# Patient Record
Sex: FEMALE | Race: WHITE | Employment: FULL TIME
[De-identification: names, ages, dates, MRNs, and addresses within clinical notes are randomized per-mention and may not be internally consistent; named-entity substitution may affect disease eponyms.]

---

## 2017-01-09 DIAGNOSIS — N64.89 BREAST ASYMMETRY: Primary | ICD-10-CM

## 2017-02-17 ENCOUNTER — OFFICE VISIT (OUTPATIENT)
Dept: FAMILY MEDICINE CLINIC | Facility: CLINIC | Age: 42
End: 2017-02-17

## 2017-02-17 VITALS
HEART RATE: 72 BPM | WEIGHT: 198.4 LBS | DIASTOLIC BLOOD PRESSURE: 72 MMHG | BODY MASS INDEX: 38.95 KG/M2 | SYSTOLIC BLOOD PRESSURE: 102 MMHG | HEIGHT: 60 IN

## 2017-02-17 DIAGNOSIS — Z00.00 ROUTINE ADULT HEALTH MAINTENANCE: ICD-10-CM

## 2017-02-17 DIAGNOSIS — E03.9 HYPOTHYROIDISM, UNSPECIFIED TYPE: Primary | ICD-10-CM

## 2017-02-17 DIAGNOSIS — F41.9 ANXIETY: ICD-10-CM

## 2017-02-17 PROCEDURE — 99213 OFFICE O/P EST LOW 20 MIN: CPT | Performed by: FAMILY MEDICINE

## 2017-02-17 RX ORDER — CITALOPRAM 20 MG/1
20 TABLET ORAL DAILY
COMMUNITY
End: 2017-11-17 | Stop reason: SDUPTHER

## 2017-03-17 ENCOUNTER — HOSPITAL ENCOUNTER (OUTPATIENT)
Age: 42
Setting detail: SPECIMEN
Discharge: HOME | End: 2017-03-17
Payer: COMMERCIAL

## 2017-03-17 ENCOUNTER — OFFICE VISIT (OUTPATIENT)
Dept: OBGYN CLINIC | Age: 42
End: 2017-03-17
Payer: COMMERCIAL

## 2017-03-17 VITALS
HEIGHT: 60 IN | SYSTOLIC BLOOD PRESSURE: 110 MMHG | BODY MASS INDEX: 38.6 KG/M2 | WEIGHT: 196.6 LBS | DIASTOLIC BLOOD PRESSURE: 80 MMHG

## 2017-03-17 DIAGNOSIS — Z01.419 ENCOUNTER FOR GYNECOLOGICAL EXAMINATION WITHOUT ABNORMAL FINDING: Primary | ICD-10-CM

## 2017-03-17 DIAGNOSIS — N89.8 VAGINAL IRRITATION: ICD-10-CM

## 2017-03-17 DIAGNOSIS — Z12.31 ENCOUNTER FOR SCREENING MAMMOGRAM FOR BREAST CANCER: ICD-10-CM

## 2017-03-17 PROCEDURE — 99396 PREV VISIT EST AGE 40-64: CPT | Performed by: NURSE PRACTITIONER

## 2017-03-17 ASSESSMENT — ENCOUNTER SYMPTOMS
ABDOMINAL DISTENTION: 0
DIARRHEA: 0
SHORTNESS OF BREATH: 0
ABDOMINAL PAIN: 0
COUGH: 0
CONSTIPATION: 0
BACK PAIN: 0

## 2017-03-18 LAB
DIRECT EXAM: NORMAL
Lab: NORMAL
SPECIMEN DESCRIPTION: NORMAL
STATUS: NORMAL

## 2017-03-22 LAB — CYTOLOGY REPORT: NORMAL

## 2017-04-06 ENCOUNTER — HOSPITAL ENCOUNTER (OUTPATIENT)
Age: 42
Discharge: HOME | End: 2017-04-06
Payer: COMMERCIAL

## 2017-04-06 DIAGNOSIS — Z00.00 ROUTINE ADULT HEALTH MAINTENANCE: ICD-10-CM

## 2017-04-06 DIAGNOSIS — E03.9 HYPOTHYROIDISM, UNSPECIFIED TYPE: ICD-10-CM

## 2017-04-06 LAB
ALBUMIN SERPL-MCNC: 4.2 G/DL (ref 3.5–5.2)
ALBUMIN/GLOBULIN RATIO: 1.5 (ref 1–2.5)
ALP BLD-CCNC: 50 U/L (ref 35–104)
ALT SERPL-CCNC: 27 U/L (ref 5–33)
ANION GAP SERPL CALCULATED.3IONS-SCNC: 12 MMOL/L (ref 9–17)
AST SERPL-CCNC: 25 U/L
BILIRUB SERPL-MCNC: 0.39 MG/DL (ref 0.3–1.2)
BUN BLDV-MCNC: 11 MG/DL (ref 6–20)
BUN/CREAT BLD: ABNORMAL (ref 9–20)
CALCIUM SERPL-MCNC: 8.9 MG/DL (ref 8.6–10.4)
CHLORIDE BLD-SCNC: 103 MMOL/L (ref 98–107)
CHOLESTEROL/HDL RATIO: 5
CHOLESTEROL: 194 MG/DL
CO2: 24 MMOL/L (ref 20–31)
CREAT SERPL-MCNC: 0.71 MG/DL (ref 0.5–0.9)
GFR AFRICAN AMERICAN: >60 ML/MIN
GFR NON-AFRICAN AMERICAN: >60 ML/MIN
GFR SERPL CREATININE-BSD FRML MDRD: ABNORMAL ML/MIN/{1.73_M2}
GFR SERPL CREATININE-BSD FRML MDRD: ABNORMAL ML/MIN/{1.73_M2}
GLUCOSE BLD-MCNC: 107 MG/DL (ref 70–99)
HDLC SERPL-MCNC: 39 MG/DL
LDL CHOLESTEROL: 107 MG/DL (ref 0–130)
POTASSIUM SERPL-SCNC: 4.3 MMOL/L (ref 3.7–5.3)
SODIUM BLD-SCNC: 139 MMOL/L (ref 135–144)
THYROXINE, FREE: 1.12 NG/DL (ref 0.93–1.7)
TOTAL PROTEIN: 7 G/DL (ref 6.4–8.3)
TRIGL SERPL-MCNC: 238 MG/DL
TSH SERPL DL<=0.05 MIU/L-ACNC: 1.16 MIU/L (ref 0.3–5)
VLDLC SERPL CALC-MCNC: ABNORMAL MG/DL (ref 1–30)

## 2017-04-06 PROCEDURE — 84443 ASSAY THYROID STIM HORMONE: CPT

## 2017-04-06 PROCEDURE — 84439 ASSAY OF FREE THYROXINE: CPT

## 2017-04-06 PROCEDURE — 80053 COMPREHEN METABOLIC PANEL: CPT

## 2017-04-06 PROCEDURE — 80061 LIPID PANEL: CPT

## 2017-04-06 PROCEDURE — 36415 COLL VENOUS BLD VENIPUNCTURE: CPT

## 2017-05-17 ENCOUNTER — OFFICE VISIT (OUTPATIENT)
Dept: FAMILY MEDICINE CLINIC | Age: 42
End: 2017-05-17
Payer: COMMERCIAL

## 2017-05-17 VITALS
WEIGHT: 195 LBS | BODY MASS INDEX: 38.28 KG/M2 | OXYGEN SATURATION: 98 % | HEIGHT: 60 IN | HEART RATE: 76 BPM | SYSTOLIC BLOOD PRESSURE: 120 MMHG | RESPIRATION RATE: 16 BRPM | DIASTOLIC BLOOD PRESSURE: 76 MMHG

## 2017-05-17 DIAGNOSIS — Z00.00 ROUTINE ADULT HEALTH MAINTENANCE: Primary | ICD-10-CM

## 2017-05-17 DIAGNOSIS — Z12.11 SCREENING FOR COLON CANCER: ICD-10-CM

## 2017-05-17 DIAGNOSIS — Z23 IMMUNIZATION DUE: ICD-10-CM

## 2017-05-17 PROCEDURE — 90715 TDAP VACCINE 7 YRS/> IM: CPT | Performed by: FAMILY MEDICINE

## 2017-05-17 PROCEDURE — 90471 IMMUNIZATION ADMIN: CPT | Performed by: FAMILY MEDICINE

## 2017-05-17 PROCEDURE — 99396 PREV VISIT EST AGE 40-64: CPT | Performed by: FAMILY MEDICINE

## 2017-05-31 ENCOUNTER — TELEPHONE (OUTPATIENT)
Dept: FAMILY MEDICINE CLINIC | Age: 42
End: 2017-05-31

## 2017-05-31 DIAGNOSIS — Z80.0 FAMILY HX OF COLON CANCER: Primary | ICD-10-CM

## 2017-05-31 DIAGNOSIS — Z12.11 ENCOUNTER FOR SCREENING COLONOSCOPY: ICD-10-CM

## 2017-08-15 ENCOUNTER — TELEPHONE (OUTPATIENT)
Dept: FAMILY MEDICINE CLINIC | Age: 42
End: 2017-08-15

## 2017-08-15 RX ORDER — LEVOTHYROXINE SODIUM 0.05 MG/1
50 TABLET ORAL DAILY
Qty: 30 TABLET | Refills: 2 | Status: SHIPPED | OUTPATIENT
Start: 2017-08-15 | End: 2017-11-17 | Stop reason: SDUPTHER

## 2017-09-06 ENCOUNTER — HOSPITAL ENCOUNTER (OUTPATIENT)
Dept: MAMMOGRAPHY | Age: 42
Discharge: HOME | End: 2017-09-06
Payer: COMMERCIAL

## 2017-09-06 DIAGNOSIS — Z12.31 ENCOUNTER FOR SCREENING MAMMOGRAM FOR BREAST CANCER: ICD-10-CM

## 2017-09-06 PROCEDURE — 77063 BREAST TOMOSYNTHESIS BI: CPT

## 2017-10-04 ENCOUNTER — OFFICE VISIT (OUTPATIENT)
Dept: FAMILY MEDICINE CLINIC | Age: 42
End: 2017-10-04
Payer: COMMERCIAL

## 2017-10-04 VITALS
DIASTOLIC BLOOD PRESSURE: 68 MMHG | WEIGHT: 195 LBS | BODY MASS INDEX: 38.08 KG/M2 | SYSTOLIC BLOOD PRESSURE: 110 MMHG | OXYGEN SATURATION: 98 % | HEART RATE: 73 BPM | RESPIRATION RATE: 16 BRPM

## 2017-10-04 DIAGNOSIS — H10.9 CONJUNCTIVITIS OF RIGHT EYE, UNSPECIFIED CONJUNCTIVITIS TYPE: Primary | ICD-10-CM

## 2017-10-04 DIAGNOSIS — Z23 IMMUNIZATION DUE: ICD-10-CM

## 2017-10-04 PROCEDURE — 90471 IMMUNIZATION ADMIN: CPT | Performed by: FAMILY MEDICINE

## 2017-10-04 PROCEDURE — 99213 OFFICE O/P EST LOW 20 MIN: CPT | Performed by: FAMILY MEDICINE

## 2017-10-04 PROCEDURE — 90688 IIV4 VACCINE SPLT 0.5 ML IM: CPT | Performed by: FAMILY MEDICINE

## 2017-10-04 RX ORDER — TOBRAMYCIN 3 MG/ML
1 SOLUTION/ DROPS OPHTHALMIC EVERY 4 HOURS
Qty: 1 BOTTLE | Refills: 0 | Status: SHIPPED | OUTPATIENT
Start: 2017-10-04 | End: 2017-10-11

## 2017-10-04 NOTE — MR AVS SNAPSHOT
becoming more physically active will help lower your risk of developing or worsening diseases associated with obesity. Learn more at: [employer]             Today's Medication Changes          These changes are accurate as of: 10/4/17 11:55 AM.  If you have any questions, ask your nurse or doctor.                START taking these medications           tobramycin 0.3 % ophthalmic solution   Commonly known as:  TOBREX   Instructions:  Place 1 drop into the right eye every 4 hours for 7 days   Quantity:  1 Bottle   Refills:  0            Where to Get Your Medications      These medications were sent to [address],  [place] [address] [place]  [address]     Phone:  [phone]     tobramycin 0.3 % ophthalmic solution               Your Current Medications Are              tobramycin (TOBREX) 0.3 % ophthalmic solution Place 1 drop into the right eye every 4 hours for 7 days    levothyroxine (SYNTHROID) 50 MCG tablet Take 1 tablet by mouth Daily    citalopram (CELEXA) 20 MG tablet Take 20 mg by mouth daily    busPIRone (BUSPAR) 15 MG tablet 1/2 bid prn      Allergies           No Known Allergies      We Ordered/Performed the following           INFLUENZA, QUADV, 3 YRS AND OLDER, IM, MDV, 0.5ML ([facility])          Additional Information        Basic Information     Date Of Birth Sex Race Ethnicity Preferred Language    1975 Female White Non-/Non  English      Problem List as of 10/4/2017  Date Reviewed: 12/28/2016                Anxiety    Abnormal Pap smear    Fibroadenoma of left breast      Immunizations as of 10/4/2017     Name Date    Influenza Virus Vaccine 10/31/2016    Influenza, Quadv, 3 Years and older, IM 10/4/2017    Tdap (Boostrix, Adacel) 5/17/2017      Preventive Care        Date Due

## 2017-10-13 ENCOUNTER — HOSPITAL ENCOUNTER (OUTPATIENT)
Age: 42
Setting detail: SPECIMEN
Discharge: HOME | End: 2017-10-13
Payer: COMMERCIAL

## 2017-10-17 LAB — SURGICAL PATHOLOGY REPORT: NORMAL

## 2017-10-30 ENCOUNTER — OFFICE VISIT (OUTPATIENT)
Dept: FAMILY MEDICINE CLINIC | Age: 42
End: 2017-10-30
Payer: COMMERCIAL

## 2017-10-30 VITALS
HEART RATE: 99 BPM | OXYGEN SATURATION: 99 % | DIASTOLIC BLOOD PRESSURE: 74 MMHG | HEIGHT: 65 IN | SYSTOLIC BLOOD PRESSURE: 116 MMHG | WEIGHT: 196.6 LBS | BODY MASS INDEX: 32.76 KG/M2

## 2017-10-30 DIAGNOSIS — H66.001 ACUTE SUPPURATIVE OTITIS MEDIA OF RIGHT EAR WITHOUT SPONTANEOUS RUPTURE OF TYMPANIC MEMBRANE, RECURRENCE NOT SPECIFIED: Primary | ICD-10-CM

## 2017-10-30 PROCEDURE — 99213 OFFICE O/P EST LOW 20 MIN: CPT | Performed by: FAMILY MEDICINE

## 2017-10-30 RX ORDER — AMOXICILLIN 875 MG/1
875 TABLET, COATED ORAL 2 TIMES DAILY
Qty: 20 TABLET | Refills: 0 | Status: SHIPPED | OUTPATIENT
Start: 2017-10-30 | End: 2017-11-09

## 2017-10-30 NOTE — PROGRESS NOTES
Subjective:  Delia Mercado presents for   Chief Complaint   Patient presents with    Otalgia     right     Came on suddenly    Denies any other sx. Bending over cuasse spressure    No discharge. No hearing or tinnitius issue    Patient Active Problem List   Diagnosis    Anxiety    Abnormal Pap smear    Fibroadenoma of left breast       Review of Systems:  · General: no significant weight changes. · Respiratory: no cough, pleuritic chest pain, dyspnea, or wheezing  · Cardiovascular: no pain, TALBOT, orthopnea, palpitations, or claudication  · Gastrointestinal: no chronic nausea, vomiting, heartburn, diarrhea, constipation, bloating, or abdominal pain. No bloody or black stools. Objective:  Physical Exam   Vitals:   Vitals:    10/30/17 1610   BP: 116/74   Pulse: 99   SpO2: 99%   Weight: 196 lb 9.6 oz (89.2 kg)   Height: 5' 5\" (1.651 m)     Wt Readings from Last 3 Encounters:   10/30/17 196 lb 9.6 oz (89.2 kg)   10/04/17 195 lb (88.5 kg)   05/17/17 195 lb (88.5 kg)     Ht Readings from Last 3 Encounters:   10/30/17 5' 5\" (1.651 m)   05/17/17 5' (1.524 m)   03/17/17 5' 0.25\" (1.53 m)     Body mass index is 32.72 kg/m². Constitutional: She is oriented to person, place, and time. She appears well-developed and well-nourished and in no acute distress. Answers all my questions appropriately. Head: Normocephalic and atraumatic. Eyes:conjunctiva appear normal.  Right Ear: External ear normal. TM is very red and retracted. Left Ear: External ear normal. TM is clear  Nose: pink, non-edematous mucosa. No polyps. No septal deviation  Throat: no erythema, tonsillar hypertrophy or exudate. No ulcerations noted. Lips/Teeth/Gums all appear normal.  Neck: Normal range of motion. Neck supple. No tracheal deviation present. No abnormal lymphadenopathy. No JVD noted. Carotids are clear bilaterally. No thyroid masses noted. Heart: RRR without murmur. No S3, S4, or gallop noted.   Chest: Clear to auscultation

## 2017-11-17 RX ORDER — CITALOPRAM 20 MG/1
20 TABLET ORAL DAILY
Qty: 30 TABLET | Refills: 3 | Status: SHIPPED | OUTPATIENT
Start: 2017-11-17 | End: 2018-03-15 | Stop reason: SDUPTHER

## 2017-11-17 RX ORDER — LEVOTHYROXINE SODIUM 0.05 MG/1
50 TABLET ORAL DAILY
Qty: 30 TABLET | Refills: 2 | Status: SHIPPED | OUTPATIENT
Start: 2017-11-17 | End: 2018-03-14 | Stop reason: SDUPTHER

## 2017-12-22 ENCOUNTER — OFFICE VISIT (OUTPATIENT)
Dept: FAMILY MEDICINE CLINIC | Age: 42
End: 2017-12-22
Payer: COMMERCIAL

## 2017-12-22 VITALS
OXYGEN SATURATION: 98 % | RESPIRATION RATE: 16 BRPM | SYSTOLIC BLOOD PRESSURE: 122 MMHG | WEIGHT: 197 LBS | BODY MASS INDEX: 32.78 KG/M2 | DIASTOLIC BLOOD PRESSURE: 80 MMHG | HEART RATE: 68 BPM

## 2017-12-22 DIAGNOSIS — R42 VERTIGO: Primary | ICD-10-CM

## 2017-12-22 PROCEDURE — 99213 OFFICE O/P EST LOW 20 MIN: CPT | Performed by: FAMILY MEDICINE

## 2017-12-22 RX ORDER — MECLIZINE HCL 12.5 MG/1
12.5 TABLET ORAL 3 TIMES DAILY PRN
Qty: 30 TABLET | Refills: 0 | Status: SHIPPED | OUTPATIENT
Start: 2017-12-22 | End: 2018-01-01

## 2017-12-22 ASSESSMENT — PATIENT HEALTH QUESTIONNAIRE - PHQ9
2. FEELING DOWN, DEPRESSED OR HOPELESS: 0
SUM OF ALL RESPONSES TO PHQ QUESTIONS 1-9: 0
SUM OF ALL RESPONSES TO PHQ9 QUESTIONS 1 & 2: 0
1. LITTLE INTEREST OR PLEASURE IN DOING THINGS: 0

## 2017-12-22 ASSESSMENT — ENCOUNTER SYMPTOMS
GASTROINTESTINAL NEGATIVE: 1
RESPIRATORY NEGATIVE: 1

## 2017-12-22 NOTE — PROGRESS NOTES
Visit Information    Have you changed or started any medications since your last visit including any over-the-counter medicines, vitamins, or herbal medicines? no   Have you stopped taking any of your medications? Is so, why? -  no  Are you having any side effects from any of your medications? - no    Have you seen any other physician or provider since your last visit?  no   Have you had any other diagnostic tests since your last visit?  no   Have you been seen in the emergency room and/or had an admission in a hospital since we last saw you?  no   Have you had your routine dental cleaning in the past 6 months?  yes -      Do you have an active MyChart account? If no, what is the barrier?   Yes    Patient Care Team:  Riya Rodriguez MD as PCP - General (Family Medicine)  Riya Rodriguez MD as PCP - [facility] Attributed Provider    Medical History Review  Past Medical, Family, and Social History reviewed and does not contribute to the patient presenting condition    Health Maintenance   Topic Date Due    HIV screen  01/06/1991    Cervical cancer screen  03/17/2018    Diabetes screen  02/18/2019    Lipid screen  04/06/2022    DTaP/Tdap/Td vaccine (2 - Td) 05/17/2027    Flu vaccine  Completed

## 2018-03-14 RX ORDER — LEVOTHYROXINE SODIUM 0.05 MG/1
TABLET ORAL
Qty: 30 TABLET | Refills: 5 | Status: SHIPPED | OUTPATIENT
Start: 2018-03-14 | End: 2018-09-04 | Stop reason: SDUPTHER

## 2018-03-14 NOTE — TELEPHONE ENCOUNTER
Next Visit Date:  No future appointments.     Health Maintenance   Topic Date Due    HIV screen  01/06/1991    Cervical cancer screen  03/17/2018    Diabetes screen  02/18/2019    Lipid screen  04/06/2022    DTaP/Tdap/Td vaccine (2 - Td) 05/17/2027    Flu vaccine  Completed       Hemoglobin A1C (%)   Date Value   02/18/2016 5.1   07/20/2013 5.4             ( goal A1C is < 7)   No results found for: LABMICR  LDL Cholesterol (mg/dL)   Date Value   04/06/2017 107       (goal LDL is <100)   AST (U/L)   Date Value   04/06/2017 25     ALT (U/L)   Date Value   04/06/2017 27     BUN (mg/dL)   Date Value   04/06/2017 11     BP Readings from Last 3 Encounters:   12/22/17 122/80   10/30/17 116/74   10/04/17 110/68          (goal 120/80)    All Future Testing planned in [facility]  Lab Frequency Next Occurrence   PAP SMEAR Once 04/14/2017   VAGINITIS DNA PROBE Once 03/17/2017               Patient Active Problem List:     Anxiety     Abnormal Pap smear     Fibroadenoma of left breast

## 2018-03-15 RX ORDER — LEVOTHYROXINE SODIUM 0.05 MG/1
TABLET ORAL
Qty: 30 TABLET | Refills: 2 | OUTPATIENT
Start: 2018-03-15

## 2018-03-15 RX ORDER — CITALOPRAM 20 MG/1
TABLET ORAL
Qty: 30 TABLET | Refills: 3 | Status: SHIPPED | OUTPATIENT
Start: 2018-03-15 | End: 2018-07-17 | Stop reason: SDUPTHER

## 2018-05-22 ENCOUNTER — EMPLOYEE WELLNESS (OUTPATIENT)
Dept: OTHER | Age: 43
End: 2018-05-22

## 2018-05-22 LAB
CHOLESTEROL/HDL RATIO: 4
CHOLESTEROL: 177 MG/DL
GLUCOSE BLD-MCNC: 107 MG/DL (ref 70–99)
HDLC SERPL-MCNC: 44 MG/DL
LDL CHOLESTEROL: 112 MG/DL (ref 0–130)
PATIENT FASTING?: YES
TRIGL SERPL-MCNC: 104 MG/DL
VLDLC SERPL CALC-MCNC: ABNORMAL MG/DL (ref 1–30)

## 2018-05-29 VITALS — WEIGHT: 199 LBS | BODY MASS INDEX: 33.12 KG/M2

## 2018-06-11 ENCOUNTER — TELEPHONE (OUTPATIENT)
Dept: FAMILY MEDICINE CLINIC | Age: 43
End: 2018-06-11

## 2018-06-27 ENCOUNTER — HOSPITAL ENCOUNTER (OUTPATIENT)
Age: 43
Setting detail: SPECIMEN
Discharge: HOME | End: 2018-06-27
Payer: COMMERCIAL

## 2018-06-27 ENCOUNTER — OFFICE VISIT (OUTPATIENT)
Dept: OBGYN CLINIC | Age: 43
End: 2018-06-27
Payer: COMMERCIAL

## 2018-06-27 VITALS
WEIGHT: 197.6 LBS | SYSTOLIC BLOOD PRESSURE: 106 MMHG | DIASTOLIC BLOOD PRESSURE: 82 MMHG | BODY MASS INDEX: 37.31 KG/M2 | HEIGHT: 61 IN

## 2018-06-27 DIAGNOSIS — N89.8 VAGINAL ITCHING: ICD-10-CM

## 2018-06-27 DIAGNOSIS — Z01.419 ENCOUNTER FOR GYNECOLOGICAL EXAMINATION: Primary | ICD-10-CM

## 2018-06-27 DIAGNOSIS — Z12.31 ENCOUNTER FOR SCREENING MAMMOGRAM FOR BREAST CANCER: ICD-10-CM

## 2018-06-27 LAB
DIRECT EXAM: NORMAL
Lab: NORMAL
SPECIMEN DESCRIPTION: NORMAL
STATUS: NORMAL

## 2018-06-27 PROCEDURE — 99396 PREV VISIT EST AGE 40-64: CPT | Performed by: NURSE PRACTITIONER

## 2018-06-27 RX ORDER — IBUPROFEN 800 MG/1
800 TABLET ORAL EVERY 8 HOURS PRN
Qty: 120 TABLET | Refills: 1 | Status: SHIPPED | OUTPATIENT
Start: 2018-06-27 | End: 2018-09-27

## 2018-06-27 ASSESSMENT — ENCOUNTER SYMPTOMS
CONSTIPATION: 0
ABDOMINAL PAIN: 0
COUGH: 0
ABDOMINAL DISTENTION: 0
BACK PAIN: 0
SHORTNESS OF BREATH: 0
DIARRHEA: 0

## 2018-07-10 ENCOUNTER — HOSPITAL ENCOUNTER (OUTPATIENT)
Age: 43
Setting detail: SPECIMEN
Discharge: HOME | End: 2018-07-10
Payer: COMMERCIAL

## 2018-07-10 ENCOUNTER — TELEPHONE (OUTPATIENT)
Dept: OBGYN CLINIC | Age: 43
End: 2018-07-10

## 2018-07-10 ENCOUNTER — PROCEDURE VISIT (OUTPATIENT)
Dept: OBGYN CLINIC | Age: 43
End: 2018-07-10
Payer: COMMERCIAL

## 2018-07-10 VITALS
WEIGHT: 200.8 LBS | SYSTOLIC BLOOD PRESSURE: 108 MMHG | DIASTOLIC BLOOD PRESSURE: 62 MMHG | BODY MASS INDEX: 37.91 KG/M2 | HEIGHT: 61 IN

## 2018-07-10 DIAGNOSIS — Z30.432 ENCOUNTER FOR IUD REMOVAL: ICD-10-CM

## 2018-07-10 DIAGNOSIS — Z30.430 ENCOUNTER FOR IUD INSERTION: ICD-10-CM

## 2018-07-10 DIAGNOSIS — Z11.3 ROUTINE SCREENING FOR STI (SEXUALLY TRANSMITTED INFECTION): ICD-10-CM

## 2018-07-10 DIAGNOSIS — Z30.019 ENCOUNTER FOR FEMALE BIRTH CONTROL: Primary | ICD-10-CM

## 2018-07-10 PROCEDURE — 58301 REMOVE INTRAUTERINE DEVICE: CPT | Performed by: NURSE PRACTITIONER

## 2018-07-10 PROCEDURE — 58300 INSERT INTRAUTERINE DEVICE: CPT | Performed by: NURSE PRACTITIONER

## 2018-07-10 NOTE — TELEPHONE ENCOUNTER
Sherry Duran spoke with Claudio Heath at [address] pt must bring [facility] Contraception Card for IUD to be covered   Called pt left msg

## 2018-07-11 LAB
C TRACH DNA GENITAL QL NAA+PROBE: NEGATIVE
N. GONORRHOEAE DNA: NEGATIVE

## 2018-07-16 LAB — CYTOLOGY REPORT: NORMAL

## 2018-07-18 RX ORDER — CITALOPRAM 20 MG/1
TABLET ORAL
Qty: 90 TABLET | Refills: 0 | Status: SHIPPED | OUTPATIENT
Start: 2018-07-18 | End: 2018-09-27 | Stop reason: ALTCHOICE

## 2018-08-08 ENCOUNTER — OFFICE VISIT (OUTPATIENT)
Dept: OBGYN CLINIC | Age: 43
End: 2018-08-08

## 2018-08-08 VITALS
SYSTOLIC BLOOD PRESSURE: 122 MMHG | WEIGHT: 198 LBS | HEIGHT: 60 IN | BODY MASS INDEX: 38.87 KG/M2 | DIASTOLIC BLOOD PRESSURE: 70 MMHG

## 2018-08-08 DIAGNOSIS — Z30.431 IUD CHECK UP: Primary | ICD-10-CM

## 2018-08-08 DIAGNOSIS — Z97.5 IUD (INTRAUTERINE DEVICE) IN PLACE: ICD-10-CM

## 2018-08-08 PROCEDURE — 99024 POSTOP FOLLOW-UP VISIT: CPT | Performed by: NURSE PRACTITIONER

## 2018-08-08 NOTE — PROGRESS NOTES
for intraepithelial lesion or malignancy. Cytotechnologist:   FANNIE Quiñones(ASCP)  **Electronically Signed Out**  cd/7/16/2018     VAGINITIS DNA PROBE    Collection Time: 06/27/18  3:05 PM   Result Value Ref Range    Specimen Description . VAGINA     Special Requests NOT REPORTED     Direct Exam NEGATIVE for Gardnerella vaginalis     Direct Exam NEGATIVE for Trichomonas vaginalis     Direct Exam NEGATIVE for Candida sp. Direct Exam       Method of testing is a DNA probe intended for detection and identification of    Direct Exam        Candida species, Gardnerella vaginalis, and Trichomonas vaginalis nucleic acid    Direct Exam        in vaginal fluid specimens from patients with symptoms of vaginitis/vaginosis.     Status FINAL 06/27/2018    C.trachomatis N.gonorrhoeae DNA    Collection Time: 07/10/18  4:20 PM   Result Value Ref Range    C. trachomatis DNA NEGATIVE NEG    N. gonorrhoeae DNA NEGATIVE NEG       P-  RTO annual exam and prn

## 2018-09-04 RX ORDER — LEVOTHYROXINE SODIUM 50 MCG
TABLET ORAL
Qty: 30 TABLET | Refills: 2 | Status: SHIPPED | OUTPATIENT
Start: 2018-09-04 | End: 2018-11-07 | Stop reason: SDUPTHER

## 2018-09-04 NOTE — TELEPHONE ENCOUNTER
Next Visit Date:  No future appointments.     Health Maintenance   Topic Date Due    HIV screen  01/06/1991    Flu vaccine (1) 09/01/2018    Diabetes screen  02/18/2019    Cervical cancer screen  06/27/2019    Lipid screen  05/22/2023    DTaP/Tdap/Td vaccine (2 - Td) 05/17/2027       Hemoglobin A1C (%)   Date Value   02/18/2016 5.1   07/20/2013 5.4             ( goal A1C is < 7)   No results found for: LABMICR  LDL Cholesterol (mg/dL)   Date Value   05/22/2018 112   04/06/2017 107       (goal LDL is <100)   AST (U/L)   Date Value   04/06/2017 25     ALT (U/L)   Date Value   04/06/2017 27     BUN (mg/dL)   Date Value   04/06/2017 11     BP Readings from Last 3 Encounters:   08/08/18 122/70   07/10/18 108/62   06/27/18 106/82          (goal 120/80)    All Future Testing planned in [facility]  Lab Frequency Next Occurrence   PAP Smear Once 07/26/2018   HAIDER DIGITAL SCREEN W CAD BILATERAL Once 09/06/2018               Patient Active Problem List:     Anxiety     Abnormal Pap smear     Fibroadenoma of left breast

## 2018-09-14 ENCOUNTER — HOSPITAL ENCOUNTER (OUTPATIENT)
Dept: MAMMOGRAPHY | Age: 43
Discharge: HOME | End: 2018-09-16
Payer: COMMERCIAL

## 2018-09-14 DIAGNOSIS — Z12.31 ENCOUNTER FOR SCREENING MAMMOGRAM FOR BREAST CANCER: ICD-10-CM

## 2018-09-14 PROCEDURE — 77067 SCR MAMMO BI INCL CAD: CPT

## 2018-09-27 ENCOUNTER — OFFICE VISIT (OUTPATIENT)
Dept: FAMILY MEDICINE CLINIC | Age: 43
End: 2018-09-27
Payer: COMMERCIAL

## 2018-09-27 VITALS
WEIGHT: 197 LBS | HEART RATE: 74 BPM | SYSTOLIC BLOOD PRESSURE: 122 MMHG | DIASTOLIC BLOOD PRESSURE: 72 MMHG | BODY MASS INDEX: 38.47 KG/M2

## 2018-09-27 DIAGNOSIS — Z23 IMMUNIZATION DUE: ICD-10-CM

## 2018-09-27 DIAGNOSIS — F41.9 ANXIETY: Primary | ICD-10-CM

## 2018-09-27 DIAGNOSIS — E03.9 HYPOTHYROIDISM, UNSPECIFIED TYPE: ICD-10-CM

## 2018-09-27 PROCEDURE — 90471 IMMUNIZATION ADMIN: CPT | Performed by: FAMILY MEDICINE

## 2018-09-27 PROCEDURE — 99213 OFFICE O/P EST LOW 20 MIN: CPT | Performed by: FAMILY MEDICINE

## 2018-09-27 PROCEDURE — 90688 IIV4 VACCINE SPLT 0.5 ML IM: CPT | Performed by: FAMILY MEDICINE

## 2018-09-27 RX ORDER — DULOXETIN HYDROCHLORIDE 30 MG/1
30 CAPSULE, DELAYED RELEASE ORAL DAILY
Qty: 30 CAPSULE | Refills: 0 | Status: SHIPPED | OUTPATIENT
Start: 2018-09-27 | End: 2019-04-09 | Stop reason: SDUPTHER

## 2018-09-27 RX ORDER — DULOXETIN HYDROCHLORIDE 30 MG/1
30 CAPSULE, DELAYED RELEASE ORAL DAILY
Qty: 90 CAPSULE | Refills: 1 | Status: SHIPPED | OUTPATIENT
Start: 2018-09-27 | End: 2019-04-09 | Stop reason: SDUPTHER

## 2018-09-27 RX ORDER — CITALOPRAM 20 MG/1
TABLET ORAL
Qty: 90 TABLET | Refills: 3 | Status: CANCELLED | OUTPATIENT
Start: 2018-09-27

## 2018-09-27 ASSESSMENT — PATIENT HEALTH QUESTIONNAIRE - PHQ9
2. FEELING DOWN, DEPRESSED OR HOPELESS: 0
SUM OF ALL RESPONSES TO PHQ QUESTIONS 1-9: 0
SUM OF ALL RESPONSES TO PHQ QUESTIONS 1-9: 0
1. LITTLE INTEREST OR PLEASURE IN DOING THINGS: 0
SUM OF ALL RESPONSES TO PHQ9 QUESTIONS 1 & 2: 0

## 2018-09-27 NOTE — PROGRESS NOTES
Subjective:      Patient ID: Leo Bob is a 44 y.o. female. HPI     Here for evaluation of anxiety    Currently taking celexa 20 mg daily. Doesn't feel like it is working. Has been on a higher dose of it before and also has taken buspar for augmentation without much effect. Lot of stress at work    History of hypothyroidism    Review of Systems   Except as noted in HPI, ROS negative    Objective:   Physical Exam   Constitutional: She appears well-developed and well-nourished. No distress. HENT:   Head: Normocephalic and atraumatic. Cardiovascular: Normal rate. Pulmonary/Chest: Effort normal.   Neurological: She is alert. Skin: Skin is warm and dry. Psychiatric:   Tearful   Vitals reviewed. Assessment:      1. Anxiety    2. Immunization due    3. Hypothyroidism, unspecified type          Plan:      Cross taper Cymbalta and Celexa.   Schedule Oralee  She plans to do bioidential hormone replacement  Check TSH/T4  FU 1 mo or sooner PRN        Coretta Schilling, DO

## 2018-09-27 NOTE — PROGRESS NOTES
Visit Information    Have you changed or started any medications since your last visit including any over-the-counter medicines, vitamins, or herbal medicines? no   Are you having any side effects from any of your medications? -  no  Have you stopped taking any of your medications? Is so, why? -  no    Have you seen any other physician or provider since your last visit? No  Have you had any other diagnostic tests since your last visit? No  Have you been seen in the emergency room and/or had an admission to a hospital since we last saw you? No  Have you had your routine dental cleaning in the past 6 months? yes -     Have you activated your [employer] account? If not, what are your barriers?  Yes     Patient Care Team:  Ratna Rogers DO as PCP - Keyur Thompson DO as PCP - [facility] Attributed Provider    Medical History Review  Past Medical, Family, and Social History reviewed and  contribute to the patient presenting condition    Health Maintenance   Topic Date Due    HIV screen  1991    Flu vaccine (1) 2018    Diabetes screen  2019    Cervical cancer screen  2019    Lipid screen  2023    DTaP/Tdap/Td vaccine (2 - Td) 2027

## 2018-09-28 ENCOUNTER — HOSPITAL ENCOUNTER (OUTPATIENT)
Age: 43
Discharge: HOME | End: 2018-09-28
Payer: COMMERCIAL

## 2018-09-28 DIAGNOSIS — E03.9 HYPOTHYROIDISM, UNSPECIFIED TYPE: ICD-10-CM

## 2018-09-28 LAB — TSH SERPL DL<=0.05 MIU/L-ACNC: 0.98 MIU/L (ref 0.3–5)

## 2018-09-28 PROCEDURE — 84443 ASSAY THYROID STIM HORMONE: CPT

## 2018-09-28 PROCEDURE — 36415 COLL VENOUS BLD VENIPUNCTURE: CPT

## 2018-11-07 RX ORDER — LEVOTHYROXINE SODIUM 50 MCG
TABLET ORAL
Qty: 30 TABLET | Refills: 11 | Status: SHIPPED | OUTPATIENT
Start: 2018-11-07 | End: 2019-12-03 | Stop reason: SDUPTHER

## 2018-12-13 DIAGNOSIS — E03.9 HYPOTHYROIDISM, UNSPECIFIED TYPE: Primary | ICD-10-CM

## 2018-12-21 ENCOUNTER — TELEPHONE (OUTPATIENT)
Dept: FAMILY MEDICINE CLINIC | Age: 43
End: 2018-12-21

## 2019-01-17 ENCOUNTER — PATIENT MESSAGE (OUTPATIENT)
Dept: FAMILY MEDICINE CLINIC | Age: 44
End: 2019-01-17

## 2019-04-30 ENCOUNTER — EMPLOYEE WELLNESS (OUTPATIENT)
Dept: OTHER | Age: 44
End: 2019-04-30

## 2019-04-30 LAB
CHOLESTEROL/HDL RATIO: 4.5
CHOLESTEROL: 229 MG/DL
GLUCOSE BLD-MCNC: 109 MG/DL (ref 70–99)
HDLC SERPL-MCNC: 51 MG/DL
LDL CHOLESTEROL: 136 MG/DL (ref 0–130)
PATIENT FASTING?: YES
TRIGL SERPL-MCNC: 210 MG/DL
VLDLC SERPL CALC-MCNC: ABNORMAL MG/DL (ref 1–30)

## 2019-05-06 VITALS — WEIGHT: 205 LBS | BODY MASS INDEX: 40.04 KG/M2

## 2019-05-29 ENCOUNTER — OFFICE VISIT (OUTPATIENT)
Dept: FAMILY MEDICINE CLINIC | Age: 44
End: 2019-05-29
Payer: COMMERCIAL

## 2019-05-29 VITALS
HEART RATE: 88 BPM | DIASTOLIC BLOOD PRESSURE: 78 MMHG | OXYGEN SATURATION: 98 % | BODY MASS INDEX: 41.23 KG/M2 | HEIGHT: 60 IN | WEIGHT: 210 LBS | SYSTOLIC BLOOD PRESSURE: 132 MMHG

## 2019-05-29 DIAGNOSIS — E03.9 HYPOTHYROIDISM, UNSPECIFIED TYPE: ICD-10-CM

## 2019-05-29 DIAGNOSIS — E78.00 ELEVATED CHOLESTEROL: ICD-10-CM

## 2019-05-29 DIAGNOSIS — Z00.00 ROUTINE PHYSICAL EXAMINATION: Primary | ICD-10-CM

## 2019-05-29 DIAGNOSIS — R00.2 PALPITATIONS: ICD-10-CM

## 2019-05-29 DIAGNOSIS — R53.83 FATIGUE, UNSPECIFIED TYPE: ICD-10-CM

## 2019-05-29 DIAGNOSIS — R63.5 WEIGHT GAIN: ICD-10-CM

## 2019-05-29 DIAGNOSIS — R73.01 ELEVATED FASTING GLUCOSE: ICD-10-CM

## 2019-05-29 PROCEDURE — 99396 PREV VISIT EST AGE 40-64: CPT | Performed by: FAMILY MEDICINE

## 2019-05-29 ASSESSMENT — PATIENT HEALTH QUESTIONNAIRE - PHQ9
SUM OF ALL RESPONSES TO PHQ QUESTIONS 1-9: 0
SUM OF ALL RESPONSES TO PHQ QUESTIONS 1-9: 0
2. FEELING DOWN, DEPRESSED OR HOPELESS: 0
1. LITTLE INTEREST OR PLEASURE IN DOING THINGS: 0
SUM OF ALL RESPONSES TO PHQ9 QUESTIONS 1 & 2: 0
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
## 2019-07-02 ENCOUNTER — HOSPITAL ENCOUNTER (OUTPATIENT)
Age: 44
Discharge: HOME | End: 2019-07-04
Payer: COMMERCIAL

## 2019-07-02 ENCOUNTER — HOSPITAL ENCOUNTER (OUTPATIENT)
Dept: ULTRASOUND IMAGING | Age: 44
Discharge: HOME | End: 2019-07-04
Payer: COMMERCIAL

## 2019-07-02 DIAGNOSIS — D48.5 NEOPLASM OF UNCERTAIN BEHAVIOR OF SKIN: ICD-10-CM

## 2019-07-02 PROCEDURE — 76882 US LMTD JT/FCL EVL NVASC XTR: CPT

## 2019-07-19 ENCOUNTER — HOSPITAL ENCOUNTER (OUTPATIENT)
Age: 44
Setting detail: SPECIMEN
Discharge: HOME | End: 2019-07-19
Payer: COMMERCIAL

## 2019-07-19 ENCOUNTER — OFFICE VISIT (OUTPATIENT)
Dept: OBGYN CLINIC | Age: 44
End: 2019-07-19
Payer: COMMERCIAL

## 2019-07-19 VITALS
SYSTOLIC BLOOD PRESSURE: 104 MMHG | WEIGHT: 204 LBS | HEIGHT: 60 IN | DIASTOLIC BLOOD PRESSURE: 70 MMHG | BODY MASS INDEX: 40.05 KG/M2

## 2019-07-19 DIAGNOSIS — Z97.5 IUD (INTRAUTERINE DEVICE) IN PLACE: ICD-10-CM

## 2019-07-19 DIAGNOSIS — Z01.419 ENCOUNTER FOR GYNECOLOGICAL EXAMINATION: Primary | ICD-10-CM

## 2019-07-19 DIAGNOSIS — Z12.31 ENCOUNTER FOR SCREENING MAMMOGRAM FOR BREAST CANCER: ICD-10-CM

## 2019-07-19 PROCEDURE — 99396 PREV VISIT EST AGE 40-64: CPT | Performed by: NURSE PRACTITIONER

## 2019-07-19 ASSESSMENT — ENCOUNTER SYMPTOMS
COUGH: 0
ABDOMINAL DISTENTION: 0
SHORTNESS OF BREATH: 0
DIARRHEA: 0
CONSTIPATION: 0
ABDOMINAL PAIN: 0
BACK PAIN: 0

## 2019-07-24 LAB — CYTOLOGY REPORT: NORMAL

## 2019-09-18 ENCOUNTER — OFFICE VISIT (OUTPATIENT)
Dept: BEHAVIORAL/MENTAL HEALTH | Age: 44
End: 2019-09-18
Payer: COMMERCIAL

## 2019-09-18 DIAGNOSIS — F41.1 GAD (GENERALIZED ANXIETY DISORDER): Primary | ICD-10-CM

## 2019-09-18 PROCEDURE — 90791 PSYCH DIAGNOSTIC EVALUATION: CPT | Performed by: PSYCHOLOGIST

## 2019-09-24 ENCOUNTER — HOSPITAL ENCOUNTER (OUTPATIENT)
Dept: MAMMOGRAPHY | Age: 44
Discharge: HOME | End: 2019-09-26
Payer: COMMERCIAL

## 2019-09-24 DIAGNOSIS — Z12.31 ENCOUNTER FOR SCREENING MAMMOGRAM FOR BREAST CANCER: ICD-10-CM

## 2019-09-24 PROCEDURE — 77063 BREAST TOMOSYNTHESIS BI: CPT

## 2019-10-02 ENCOUNTER — OFFICE VISIT (OUTPATIENT)
Dept: BEHAVIORAL/MENTAL HEALTH | Age: 44
End: 2019-10-02
Payer: COMMERCIAL

## 2019-10-02 DIAGNOSIS — F41.1 GAD (GENERALIZED ANXIETY DISORDER): Primary | ICD-10-CM

## 2019-10-02 PROCEDURE — 90837 PSYTX W PT 60 MINUTES: CPT | Performed by: PSYCHOLOGIST

## 2019-10-16 ENCOUNTER — OFFICE VISIT (OUTPATIENT)
Dept: BEHAVIORAL/MENTAL HEALTH CLINIC | Age: 44
End: 2019-10-16
Payer: COMMERCIAL

## 2019-10-16 DIAGNOSIS — F41.1 GAD (GENERALIZED ANXIETY DISORDER): Primary | ICD-10-CM

## 2019-10-16 PROCEDURE — 90837 PSYTX W PT 60 MINUTES: CPT | Performed by: PSYCHOLOGIST

## 2019-11-06 ENCOUNTER — OFFICE VISIT (OUTPATIENT)
Dept: BEHAVIORAL/MENTAL HEALTH CLINIC | Age: 44
End: 2019-11-06
Payer: COMMERCIAL

## 2019-11-06 DIAGNOSIS — F41.1 GAD (GENERALIZED ANXIETY DISORDER): Primary | ICD-10-CM

## 2019-11-06 PROCEDURE — 90837 PSYTX W PT 60 MINUTES: CPT | Performed by: PSYCHOLOGIST

## 2019-12-02 ENCOUNTER — TELEPHONE (OUTPATIENT)
Dept: BEHAVIORAL/MENTAL HEALTH CLINIC | Age: 44
End: 2019-12-02

## 2019-12-03 RX ORDER — LEVOTHYROXINE SODIUM 0.05 MG/1
TABLET ORAL
Qty: 30 TABLET | Refills: 0 | Status: SHIPPED | OUTPATIENT
Start: 2019-12-03 | End: 2019-12-10 | Stop reason: SDUPTHER

## 2019-12-10 ENCOUNTER — INITIAL CONSULT (OUTPATIENT)
Dept: ENDOCRINOLOGY | Age: 44
End: 2019-12-10
Payer: COMMERCIAL

## 2019-12-10 VITALS
HEART RATE: 80 BPM | SYSTOLIC BLOOD PRESSURE: 100 MMHG | WEIGHT: 205.4 LBS | DIASTOLIC BLOOD PRESSURE: 80 MMHG | HEIGHT: 60 IN | BODY MASS INDEX: 40.33 KG/M2

## 2019-12-10 DIAGNOSIS — E03.8 HYPOTHYROIDISM DUE TO HASHIMOTO'S THYROIDITIS: Primary | ICD-10-CM

## 2019-12-10 DIAGNOSIS — E06.3 HYPOTHYROIDISM DUE TO HASHIMOTO'S THYROIDITIS: Primary | ICD-10-CM

## 2019-12-10 PROCEDURE — 99244 OFF/OP CNSLTJ NEW/EST MOD 40: CPT | Performed by: INTERNAL MEDICINE

## 2019-12-10 RX ORDER — LEVOTHYROXINE SODIUM 0.05 MG/1
TABLET ORAL
Qty: 90 TABLET | Refills: 0 | Status: SHIPPED | OUTPATIENT
Start: 2019-12-10 | End: 2020-03-23

## 2020-01-06 ENCOUNTER — OFFICE VISIT (OUTPATIENT)
Dept: FAMILY MEDICINE CLINIC | Age: 45
End: 2020-01-06
Payer: COMMERCIAL

## 2020-01-06 VITALS
BODY MASS INDEX: 40.34 KG/M2 | DIASTOLIC BLOOD PRESSURE: 83 MMHG | HEART RATE: 58 BPM | TEMPERATURE: 97.8 F | SYSTOLIC BLOOD PRESSURE: 123 MMHG | RESPIRATION RATE: 16 BRPM | WEIGHT: 205.47 LBS | HEIGHT: 60 IN

## 2020-01-06 PROCEDURE — 99213 OFFICE O/P EST LOW 20 MIN: CPT | Performed by: NURSE PRACTITIONER

## 2020-01-06 RX ORDER — AMOXICILLIN AND CLAVULANATE POTASSIUM 875; 125 MG/1; MG/1
1 TABLET, FILM COATED ORAL 2 TIMES DAILY
Qty: 20 TABLET | Refills: 0 | Status: SHIPPED | OUTPATIENT
Start: 2020-01-06 | End: 2020-01-16

## 2020-01-06 RX ORDER — FLUTICASONE PROPIONATE 50 MCG
2 SPRAY, SUSPENSION (ML) NASAL DAILY
Qty: 1 BOTTLE | Refills: 0 | Status: SHIPPED | OUTPATIENT
Start: 2020-01-06 | End: 2020-01-27 | Stop reason: ALTCHOICE

## 2020-01-06 ASSESSMENT — ENCOUNTER SYMPTOMS
COUGH: 1
VOMITING: 0
RHINORRHEA: 0
SORE THROAT: 1
DIARRHEA: 0
ABDOMINAL PAIN: 0

## 2020-01-06 NOTE — PROGRESS NOTES
3     No current facility-administered medications for this visit. No Known Allergies    Subjective:      Review of Systems   HENT: Positive for ear pain, hearing loss ( rt ear) and sore throat. Negative for ear discharge and rhinorrhea. Respiratory: Positive for cough. Gastrointestinal: Negative for abdominal pain, diarrhea and vomiting. Musculoskeletal: Negative for neck pain. Skin: Negative for rash. Neurological: Negative for headaches. All other systems reviewed and are negative. 14 systems reviewed and negative except as listed in HPI. Objective:     Physical Exam  Vitals signs and nursing note reviewed. Constitutional:       General: She is not in acute distress. Appearance: Normal appearance. She is well-developed. She is not ill-appearing, toxic-appearing or diaphoretic. Comments: nontoxic   HENT:      Head: Normocephalic and atraumatic. Comments: Rt tm bulging and injected  Left tm with middle ear effusion     Right Ear: External ear normal.      Left Ear: External ear normal.      Nose:      Comments: pnd     Mouth/Throat:      Mouth: Mucous membranes are moist.      Pharynx: No oropharyngeal exudate or posterior oropharyngeal erythema. Eyes:      General: No scleral icterus. Right eye: No discharge. Left eye: No discharge. Extraocular Movements: Extraocular movements intact. Conjunctiva/sclera: Conjunctivae normal.      Pupils: Pupils are equal, round, and reactive to light. Neck:      Musculoskeletal: Normal range of motion and neck supple. Cardiovascular:      Rate and Rhythm: Normal rate and regular rhythm. Heart sounds: Normal heart sounds. Pulmonary:      Effort: Pulmonary effort is normal. No respiratory distress. Breath sounds: Normal breath sounds. No stridor. No wheezing, rhonchi or rales. Chest:      Chest wall: No tenderness. Abdominal:      General: Bowel sounds are normal. There is no distension.

## 2020-01-06 NOTE — PATIENT INSTRUCTIONS
Follow up Family Doctor in 2 weeks for ear recheck  Return worse, new symptoms develop, symptoms persist or have any questions or concerns  If over 6 months old, then may alternate Tylenol/Motrin every 3 hours as needed for pain or fever - take per package instructions  If under 6 months old, do not use Motrin (Ibuprofen), use Tylenol only, Take per package instructions  Cool mist humidifier bedside  Patient Education        Ear Infection (Otitis Media): Care Instructions  Your Care Instructions    An ear infection may start with a cold and affect the middle ear (otitis media). It can hurt a lot. Most ear infections clear up on their own in a couple of days. Most often you will not need antibiotics. This is because many ear infections are caused by a virus. Antibiotics don't work against a virus. Regular doses of pain medicines are the best way to reduce your fever and help you feel better. Follow-up care is a key part of your treatment and safety. Be sure to make and go to all appointments, and call your doctor if you are having problems. It's also a good idea to know your test results and keep a list of the medicines you take. How can you care for yourself at home? · Take pain medicines exactly as directed. ? If the doctor gave you a prescription medicine for pain, take it as prescribed. ? If you are not taking a prescription pain medicine, take an over-the-counter medicine, such as acetaminophen (Tylenol), ibuprofen (Advil, Motrin), or naproxen (Aleve). Read and follow all instructions on the label. ? Do not take two or more pain medicines at the same time unless the doctor told you to. Many pain medicines have acetaminophen, which is Tylenol. Too much acetaminophen (Tylenol) can be harmful. · Plan to take a full dose of pain reliever before bedtime. Getting enough sleep will help you get better. · Try a warm, moist washcloth on the ear. It may help relieve pain.   · If your doctor prescribed antibiotics, take them as directed. Do not stop taking them just because you feel better. You need to take the full course of antibiotics. When should you call for help? Call your doctor now or seek immediate medical care if:    · You have new or increasing ear pain.     · You have new or increasing pus or blood draining from your ear.     · You have a fever with a stiff neck or a severe headache.    Watch closely for changes in your health, and be sure to contact your doctor if:    · You have new or worse symptoms.     · You are not getting better after taking an antibiotic for 2 days. Where can you learn more? Go to https://[employer] Americapepiceweb.[employer]. org and sign in to your [employer] account. Enter A065 in the [employer] box to learn more about \"Ear Infection (Otitis Media): Care Instructions. \"     If you do not have an account, please click on the \"Sign Up Now\" link. Current as of: October 21, 2018  Content Version: 12.1  © 0001-1998 Healthwise, Incorporated. Care instructions adapted under license by [facility] ([facility]). If you have questions about a medical condition or this instruction, always ask your healthcare professional. [address] any warranty or liability for your use of this information.

## 2020-01-22 ENCOUNTER — HOSPITAL ENCOUNTER (OUTPATIENT)
Age: 45
Setting detail: SPECIMEN
Discharge: HOME | End: 2020-01-22
Payer: COMMERCIAL

## 2020-01-27 ENCOUNTER — OFFICE VISIT (OUTPATIENT)
Dept: FAMILY MEDICINE CLINIC | Age: 45
End: 2020-01-27
Payer: COMMERCIAL

## 2020-01-27 VITALS
DIASTOLIC BLOOD PRESSURE: 78 MMHG | SYSTOLIC BLOOD PRESSURE: 130 MMHG | WEIGHT: 205 LBS | BODY MASS INDEX: 34.16 KG/M2 | TEMPERATURE: 98.1 F | HEART RATE: 94 BPM | HEIGHT: 65 IN | OXYGEN SATURATION: 98 %

## 2020-01-27 LAB — DERMATOLOGY PATHOLOGY REPORT: NORMAL

## 2020-01-27 PROCEDURE — 99214 OFFICE O/P EST MOD 30 MIN: CPT | Performed by: NURSE PRACTITIONER

## 2020-01-27 RX ORDER — GUAIFENESIN AND CODEINE PHOSPHATE 100; 10 MG/5ML; MG/5ML
5 SOLUTION ORAL EVERY 4 HOURS PRN
Qty: 120 ML | Refills: 0 | Status: SHIPPED | OUTPATIENT
Start: 2020-01-27 | End: 2020-01-30

## 2020-01-27 RX ORDER — AMOXICILLIN AND CLAVULANATE POTASSIUM 875; 125 MG/1; MG/1
1 TABLET, FILM COATED ORAL 2 TIMES DAILY
Qty: 14 TABLET | Refills: 0 | Status: SHIPPED | OUTPATIENT
Start: 2020-01-27 | End: 2020-02-03

## 2020-01-27 RX ORDER — PREDNISONE 10 MG/1
10 TABLET ORAL
Qty: 15 TABLET | Refills: 0 | Status: SHIPPED | OUTPATIENT
Start: 2020-01-27 | End: 2020-02-01

## 2020-01-27 ASSESSMENT — PATIENT HEALTH QUESTIONNAIRE - PHQ9
SUM OF ALL RESPONSES TO PHQ9 QUESTIONS 1 & 2: 0
2. FEELING DOWN, DEPRESSED OR HOPELESS: 0
SUM OF ALL RESPONSES TO PHQ QUESTIONS 1-9: 0
1. LITTLE INTEREST OR PLEASURE IN DOING THINGS: 0
SUM OF ALL RESPONSES TO PHQ QUESTIONS 1-9: 0

## 2020-01-27 NOTE — PROGRESS NOTES
bulging. Tympanic membrane is not injected or erythematous. Nose: Mucosal edema and congestion present. No nasal deformity, septal deviation, nasal tenderness or rhinorrhea. Mouth/Throat:      Lips: Pink. Mouth: Mucous membranes are moist.      Tongue: No lesions. Tongue does not protrude in midline (no obvious swelling). Pharynx: Oropharynx is clear. Uvula midline. Posterior oropharyngeal erythema present. No pharyngeal swelling, oropharyngeal exudate or uvula swelling. Tonsils: No tonsillar exudate or tonsillar abscesses. Eyes:PERRL, EOMI, Conjunctiva normal, No discharge. · Neck: Full passive range of motion. Non-tender on palpation. Neck supple. No thyromegaly present. Trachea normal.  · Cardiovascular: Normal rate, regular rhythm, S1, S2, no murmur, no gallop, no friction rub, intact distal pulses. · Pulmonary/Chest: Breath sounds are clear throughout, No respiratory distress, No wheezing, No chest tenderness. Effort normal  · Abdominal: Soft. Normal appearance, bowel sounds are present and normoactive. There is no hepatosplenomegaly. There is no tenderness. There is no CVA tenderness. · Musculoskeletal: Extremities appear regular and symmetric. No evident masses, lesions, foreign bodies, or other abnormalities. No edema. No tenderness on palpation. Joints are stable. Full ROM, strength and tone are within normal limits. · Lymphadenopathy: No lymphadenopathy noted. · Neurological: Alert and oriented to person, place, and time. Normal motor function, Normal sensory function, No focal deficits noted. He has normal strength. · Skin: Skin is warm, dry and intact. No obvious lesions on exposed skin  · Psychiatric: Normal mood and affect. Speech is normal and behavior is normal.     Nursing note and vitals reviewed.   Blood pressure 130/78, pulse 94, temperature 98.1 °F (36.7 °C), temperature source Temporal, height 5' 5\" (1.651 m), weight 205 lb (93 kg), SpO2 98 %, fail to improve. 1.  Thor Moncada received counseling on the following healthy behaviors: nutrition, exercise and medication adherence  2. Patient given educational materials - see patient instructions  3. Was a self-tracking handout given in paper form or via [employer]? No  If yes, see orders or list here. 4.  Discussed use, benefit, and side effects of prescribed medications. Barriers to medication compliance addressed. All patient questions answered. Pt voiced understanding. 5.  Reviewed prior labs, imaging, consultation, follow up, and health maintenance  6. Continue current medications, diet and exercise. 7. Discussed use, benefit, and side effects of prescribed medications. Barriers to medication compliance addressed. All her questions were answered. Pt voiced understanding. Thor Moncada will continue current medications, diet and exercise. Patient given educational materials on otitis media, sinusitis    Of the 25 minute duration appointment visit, Ness Toney CNP spent at least 50% of the face-to-face time in counseling, explanation of diagnosis, planning of further management, and answering all questions. Signed:  Ness Toney CNP    This note is created with the assistance of a speech-recognition program.  While intending to generate a document that actually reflects the content of the visit, no guarantees can be provided that every mistake has been identified and corrected by editing.

## 2020-01-27 NOTE — PATIENT INSTRUCTIONS
your nose. · Put a hot, wet towel or a warm gel pack on your face 3 or 4 times a day for 5 to 10 minutes each time. · Try a decongestant nasal spray like oxymetazoline (Afrin). Do not use it for more than 3 days in a row. Using it for more than 3 days can make your congestion worse. When should you call for help? Call your doctor now or seek immediate medical care if:    · You have new or worse swelling or redness in your face or around your eyes.     · You have a new or higher fever.    Watch closely for changes in your health, and be sure to contact your doctor if:    · You have new or worse facial pain.     · The mucus from your nose becomes thicker (like pus) or has new blood in it.     · You are not getting better as expected. Where can you learn more? Go to https://NovatekpepicClipsureeb.Motally. org and sign in to your Kaggle account. Enter M915 in the [facility] box to learn more about \"Sinusitis: Care Instructions. \"     If you do not have an account, please click on the \"Sign Up Now\" link. Current as of: July 28, 2019  Content Version: 12.3  © 0449-2622 [employer]. Care instructions adapted under license by [facility] ([facility]). If you have questions about a medical condition or this instruction, always ask your healthcare professional. [place] [address] any warranty or liability for your use of this information. Ear Infection (Otitis Media): Care Instructions  Your Care Instructions    An ear infection may start with a cold and affect the middle ear (otitis media). It can hurt a lot. Most ear infections clear up on their own in a couple of days. Most often you will not need antibiotics. This is because many ear infections are caused by a virus. Antibiotics don't work against a virus. Regular doses of pain medicines are the best way to reduce your fever and help you feel better. Follow-up care is a key part of your treatment and safety.  Be sure to make and go to all appointments, and call your doctor if you are having problems. It's also a good idea to know your test results and keep a list of the medicines you take. How can you care for yourself at home? · Take pain medicines exactly as directed. ? If the doctor gave you a prescription medicine for pain, take it as prescribed. ? If you are not taking a prescription pain medicine, take an over-the-counter medicine, such as acetaminophen (Tylenol), ibuprofen (Advil, Motrin), or naproxen (Aleve). Read and follow all instructions on the label. ? Do not take two or more pain medicines at the same time unless the doctor told you to. Many pain medicines have acetaminophen, which is Tylenol. Too much acetaminophen (Tylenol) can be harmful. · Plan to take a full dose of pain reliever before bedtime. Getting enough sleep will help you get better. · Try a warm, moist washcloth on the ear. It may help relieve pain. · If your doctor prescribed antibiotics, take them as directed. Do not stop taking them just because you feel better. You need to take the full course of antibiotics. When should you call for help? Call your doctor now or seek immediate medical care if:    · You have new or increasing ear pain.     · You have new or increasing pus or blood draining from your ear.     · You have a fever with a stiff neck or a severe headache.    Watch closely for changes in your health, and be sure to contact your doctor if:    · You have new or worse symptoms.     · You are not getting better after taking an antibiotic for 2 days. Where can you learn more? Go to https://CoAdna Photonicsdwaine.Kaybus. org and sign in to your myTips account. Enter T093 in the [facility] box to learn more about \"Ear Infection (Otitis Media): Care Instructions. \"     If you do not have an account, please click on the \"Sign Up Now\" link. Current as of: July 28, 2019  Content Version: 12.3  © 0150-2420 Healthwise, Incorporated. Care instructions adapted under license by [facility] ([facility]). If you have questions about a medical condition or this instruction, always ask your healthcare professional. [address] any warranty or liability for your use of this information.

## 2020-03-23 RX ORDER — LEVOTHYROXINE SODIUM 0.05 MG/1
TABLET ORAL
Qty: 90 TABLET | Refills: 1 | Status: SHIPPED | OUTPATIENT
Start: 2020-03-23 | End: 2020-09-29 | Stop reason: SDUPTHER

## 2020-03-26 RX ORDER — DULOXETIN HYDROCHLORIDE 30 MG/1
30 CAPSULE, DELAYED RELEASE ORAL DAILY
Qty: 90 CAPSULE | Refills: 0 | Status: SHIPPED | OUTPATIENT
Start: 2020-03-26 | End: 2020-04-09 | Stop reason: SDUPTHER

## 2020-04-09 ENCOUNTER — E-VISIT (OUTPATIENT)
Dept: FAMILY MEDICINE CLINIC | Age: 45
End: 2020-04-09
Payer: COMMERCIAL

## 2020-04-09 PROCEDURE — 99421 OL DIG E/M SVC 5-10 MIN: CPT | Performed by: FAMILY MEDICINE

## 2020-04-09 RX ORDER — BUSPIRONE HYDROCHLORIDE 10 MG/1
10 TABLET ORAL 3 TIMES DAILY PRN
Qty: 90 TABLET | Refills: 0 | Status: SHIPPED | OUTPATIENT
Start: 2020-04-09 | End: 2020-05-09

## 2020-04-09 RX ORDER — DULOXETIN HYDROCHLORIDE 60 MG/1
60 CAPSULE, DELAYED RELEASE ORAL DAILY
Qty: 90 CAPSULE | Refills: 3 | Status: SHIPPED | OUTPATIENT
Start: 2020-04-09 | End: 2020-09-23 | Stop reason: SDUPTHER

## 2020-08-13 ENCOUNTER — EMPLOYEE WELLNESS (OUTPATIENT)
Dept: OTHER | Age: 45
End: 2020-08-13

## 2020-08-13 LAB
CHOLESTEROL/HDL RATIO: 4.5
CHOLESTEROL: 187 MG/DL
GLUCOSE BLD-MCNC: 109 MG/DL (ref 70–99)
HDLC SERPL-MCNC: 42 MG/DL
LDL CHOLESTEROL: 116 MG/DL (ref 0–130)
PATIENT FASTING?: ABNORMAL
TRIGL SERPL-MCNC: 147 MG/DL
VLDLC SERPL CALC-MCNC: ABNORMAL MG/DL (ref 1–30)

## 2020-09-23 ENCOUNTER — HOSPITAL ENCOUNTER (OUTPATIENT)
Age: 45
Setting detail: SPECIMEN
Discharge: HOME | End: 2020-09-23
Payer: COMMERCIAL

## 2020-09-23 ENCOUNTER — OFFICE VISIT (OUTPATIENT)
Dept: FAMILY MEDICINE CLINIC | Age: 45
End: 2020-09-23
Payer: COMMERCIAL

## 2020-09-23 ENCOUNTER — OFFICE VISIT (OUTPATIENT)
Dept: OBGYN CLINIC | Age: 45
End: 2020-09-23
Payer: COMMERCIAL

## 2020-09-23 VITALS
HEART RATE: 92 BPM | SYSTOLIC BLOOD PRESSURE: 122 MMHG | WEIGHT: 208.6 LBS | BODY MASS INDEX: 39.74 KG/M2 | DIASTOLIC BLOOD PRESSURE: 80 MMHG | OXYGEN SATURATION: 99 % | TEMPERATURE: 97.1 F

## 2020-09-23 VITALS
SYSTOLIC BLOOD PRESSURE: 122 MMHG | HEIGHT: 61 IN | WEIGHT: 206 LBS | DIASTOLIC BLOOD PRESSURE: 78 MMHG | BODY MASS INDEX: 38.89 KG/M2

## 2020-09-23 PROBLEM — E03.9 ACQUIRED HYPOTHYROIDISM: Status: ACTIVE | Noted: 2020-09-23

## 2020-09-23 PROBLEM — F32.A ANXIETY AND DEPRESSION: Status: ACTIVE | Noted: 2020-09-23

## 2020-09-23 PROBLEM — F41.9 ANXIETY AND DEPRESSION: Status: ACTIVE | Noted: 2020-09-23

## 2020-09-23 PROBLEM — R73.9 HYPERGLYCEMIA: Status: ACTIVE | Noted: 2020-09-23

## 2020-09-23 LAB
ABSOLUTE EOS #: 0.26 K/UL (ref 0–0.44)
ABSOLUTE IMMATURE GRANULOCYTE: <0.03 K/UL (ref 0–0.3)
ABSOLUTE LYMPH #: 2.72 K/UL (ref 1.1–3.7)
ABSOLUTE MONO #: 0.52 K/UL (ref 0.1–1.2)
ALBUMIN SERPL-MCNC: 4.5 G/DL (ref 3.5–5.2)
ALBUMIN/GLOBULIN RATIO: 1.5 (ref 1–2.5)
ALP BLD-CCNC: 49 U/L (ref 35–104)
ALT SERPL-CCNC: 34 U/L (ref 5–33)
ANION GAP SERPL CALCULATED.3IONS-SCNC: 15 MMOL/L (ref 9–17)
AST SERPL-CCNC: 30 U/L
BASOPHILS # BLD: 1 % (ref 0–2)
BASOPHILS ABSOLUTE: 0.08 K/UL (ref 0–0.2)
BILIRUB SERPL-MCNC: 0.55 MG/DL (ref 0.3–1.2)
BUN BLDV-MCNC: 14 MG/DL (ref 6–20)
BUN/CREAT BLD: ABNORMAL (ref 9–20)
CALCIUM SERPL-MCNC: 9.9 MG/DL (ref 8.6–10.4)
CHLORIDE BLD-SCNC: 102 MMOL/L (ref 98–107)
CO2: 26 MMOL/L (ref 20–31)
CREAT SERPL-MCNC: 0.7 MG/DL (ref 0.5–0.9)
DIFFERENTIAL TYPE: ABNORMAL
EOSINOPHILS RELATIVE PERCENT: 4 % (ref 1–4)
ESTIMATED AVERAGE GLUCOSE: 108 MG/DL
GFR AFRICAN AMERICAN: >60 ML/MIN
GFR NON-AFRICAN AMERICAN: >60 ML/MIN
GFR SERPL CREATININE-BSD FRML MDRD: ABNORMAL ML/MIN/{1.73_M2}
GFR SERPL CREATININE-BSD FRML MDRD: ABNORMAL ML/MIN/{1.73_M2}
GLUCOSE BLD-MCNC: 97 MG/DL (ref 70–99)
HBA1C MFR BLD: 5.4 % (ref 4–6)
HCT VFR BLD CALC: 48.9 % (ref 36.3–47.1)
HEMOGLOBIN: 15.8 G/DL (ref 11.9–15.1)
IMMATURE GRANULOCYTES: 0 %
LYMPHOCYTES # BLD: 42 % (ref 24–43)
MCH RBC QN AUTO: 30.7 PG (ref 25.2–33.5)
MCHC RBC AUTO-ENTMCNC: 32.3 G/DL (ref 28.4–34.8)
MCV RBC AUTO: 95 FL (ref 82.6–102.9)
MONOCYTES # BLD: 8 % (ref 3–12)
NRBC AUTOMATED: 0 PER 100 WBC
PDW BLD-RTO: 12 % (ref 11.8–14.4)
PLATELET # BLD: 287 K/UL (ref 138–453)
PLATELET ESTIMATE: ABNORMAL
PMV BLD AUTO: 9.7 FL (ref 8.1–13.5)
POTASSIUM SERPL-SCNC: 4.4 MMOL/L (ref 3.7–5.3)
RBC # BLD: 5.15 M/UL (ref 3.95–5.11)
RBC # BLD: ABNORMAL 10*6/UL
SEG NEUTROPHILS: 45 % (ref 36–65)
SEGMENTED NEUTROPHILS ABSOLUTE COUNT: 2.81 K/UL (ref 1.5–8.1)
SODIUM BLD-SCNC: 143 MMOL/L (ref 135–144)
THYROXINE, FREE: 1.15 NG/DL (ref 0.93–1.7)
TOTAL PROTEIN: 7.5 G/DL (ref 6.4–8.3)
TSH SERPL DL<=0.05 MIU/L-ACNC: 1.09 MIU/L (ref 0.3–5)
VITAMIN D 25-HYDROXY: 32.1 NG/ML (ref 30–100)
WBC # BLD: 6.4 K/UL (ref 3.5–11.3)
WBC # BLD: ABNORMAL 10*3/UL

## 2020-09-23 PROCEDURE — 99213 OFFICE O/P EST LOW 20 MIN: CPT | Performed by: NURSE PRACTITIONER

## 2020-09-23 PROCEDURE — 99396 PREV VISIT EST AGE 40-64: CPT | Performed by: NURSE PRACTITIONER

## 2020-09-23 RX ORDER — DULOXETIN HYDROCHLORIDE 30 MG/1
30 CAPSULE, DELAYED RELEASE ORAL DAILY
Qty: 30 CAPSULE | Refills: 0
Start: 2020-09-23 | End: 2020-10-06 | Stop reason: ALTCHOICE

## 2020-09-23 RX ORDER — VENLAFAXINE HYDROCHLORIDE 37.5 MG/1
37.5 CAPSULE, EXTENDED RELEASE ORAL DAILY
Qty: 30 CAPSULE | Refills: 1 | Status: SHIPPED
Start: 2020-09-23 | End: 2020-10-06 | Stop reason: DRUGHIGH

## 2020-09-23 ASSESSMENT — ENCOUNTER SYMPTOMS
ABDOMINAL DISTENTION: 0
ABDOMINAL PAIN: 0
DIARRHEA: 0
SHORTNESS OF BREATH: 0
CONSTIPATION: 0
COUGH: 0
BACK PAIN: 0

## 2020-09-23 ASSESSMENT — PATIENT HEALTH QUESTIONNAIRE - PHQ9
SUM OF ALL RESPONSES TO PHQ QUESTIONS 1-9: 0
SUM OF ALL RESPONSES TO PHQ9 QUESTIONS 1 & 2: 0
1. LITTLE INTEREST OR PLEASURE IN DOING THINGS: 0
SUM OF ALL RESPONSES TO PHQ QUESTIONS 1-9: 0
2. FEELING DOWN, DEPRESSED OR HOPELESS: 0

## 2020-09-23 NOTE — PROGRESS NOTES
HPI:     Leann Clancy a 41 y.o. female who presents today for:No chief complaint on file. HPI  Here for annual exam.  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 Has 2 children- 16/13. Works out 3-4 days/week. Eats healthy. GYNECOLOGICHISTORY:  MenstrualHistory: No LMP recorded. Patient has had an implant. Sexually Transmitted Infections: No  History of Ectopic Pregnancy:No  Menarche: 7  Spots only a couple of times/year  Sexually active: men- one only  Dyspareunia:    Current Outpatient Medications   Medication Sig Dispense Refill    DULoxetine (CYMBALTA) 60 MG extended release capsule Take 1 capsule by mouth daily Dose increased 4/9/2020 90 capsule 3    levothyroxine (SYNTHROID) 50 MCG tablet TAKE 1 TABLET BY MOUTH ONE TIME A DAY 90 tablet 1     No current facility-administered medications for this visit.       No Known Allergies    Past Medical History:   Diagnosis Date    Abnormal Pap smear 2005    Anxiety     GDM (gestational diabetes mellitus) 2007     Denies family history of breast, ovarian, uterus, colon CA     Past Surgical History:   Procedure Laterality Date    CERVIX BIOPSY      COLPOSCOPY      INTRAUTERINE DEVICE INSERTION  05/2013    Mirena    INTRAUTERINE DEVICE INSERTION  07/10/2018    Mirena     INTRAUTERINE DEVICE REMOVAL  07/10/2018    SUPA  2005     Family History   Problem Relation Age of Onset    Heart Disease Maternal Aunt     Heart Disease Maternal Grandmother     Heart Disease Maternal Grandfather     Cancer Maternal Grandfather         prostate/lung    Other Paternal Grandfather         aneurysm    High Cholesterol Mother     Other Father         mass in his colon     Diabetes Father     Cancer Father         colon     Social History     Tobacco Use    Smoking status: Former Smoker    Smokeless tobacco: Never Used   Substance Use Topics    Alcohol use: No        Subjective:      Review of Systems   Constitutional: Negative for appetite change and fatigue. HENT: Negative for congestion and hearing loss. Eyes: Negative for visual disturbance. Respiratory: Negative for cough and shortness of breath. Cardiovascular: Negative for chest pain and palpitations. Gastrointestinal: Negative for abdominal distention, abdominal pain, constipation and diarrhea. Genitourinary: Negative for flank pain, frequency, menstrual problem, pelvic pain and vaginal discharge. Musculoskeletal: Negative for back pain. Neurological: Negative for syncope and headaches. Psychiatric/Behavioral: Negative for behavioral problems. Objective: There were no vitals taken for this visit. Physical Exam  Constitutional:       Appearance: She is well-developed. Eyes:      Pupils: Pupils are equal, round, and reactive to light. Neck:      Thyroid: No thyromegaly. Trachea: No tracheal deviation. Cardiovascular:      Rate and Rhythm: Normal rate and regular rhythm. Heart sounds: Normal heart sounds. Pulmonary:      Effort: Pulmonary effort is normal. No respiratory distress. Breath sounds: Normal breath sounds. Chest:      Breasts: Breasts are symmetrical.         Right: No inverted nipple. Left: No inverted nipple, mass, nipple discharge, skin change or tenderness. Abdominal:      General: Bowel sounds are normal. There is no distension. Palpations: Abdomen is soft. There is no mass. Tenderness: There is no abdominal tenderness. Hernia: There is no hernia in the left inguinal area. Genitourinary:     Labia:         Right: No rash or lesion. Left: No rash or lesion. Vagina: No vaginal discharge or tenderness. Cervix: No cervical motion tenderness, discharge or friability. Uterus: Not deviated, not enlarged and not fixed. Adnexa:         Right: No mass, tenderness or fullness. Left: No mass, tenderness or fullness. Musculoskeletal:         General: No tenderness. Lymphadenopathy:      Cervical: No cervical adenopathy. Skin:     General: Skin is warm and dry. Neurological:      Mental Status: She is alert and oriented to person, place, and time. Psychiatric:         Behavior: Behavior normal.         Thought Content: Thought content normal.         Judgment: Judgment normal.     IUD strings visualized and appropriate length        Assessment:     1. Encounter for gynecological examination    2. Encounter for screening mammogram for breast cancer          Plan:   1. Pap collected. Discussed new pap smear guidelines. Desires re-pap in 1 year. 2. Screening mammogram discussed and advised yearly if within normal limits. 3. Calcium and Vitamin D dosing reviewed. 4. Colonoscopy screening reviewed. 5. Bone density testing reviewed.      Electronicallysigned by Julio César Nelson on 9/23/2020

## 2020-09-23 NOTE — PROGRESS NOTES
Jose uLis Marks, ANKUSHP-C  [address]  [id number] [facility]. [employer]  [phone]  [phone]    Abby Henriquez is a 41 y.o. 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     - she denies thoughts of harming herself       Patient Active Problem List:     Anxiety     Abnormal Pap smear     Fibroadenoma of left breast     Past Medical History:   Diagnosis Date    Abnormal Pap smear 2005    Anxiety     GDM (gestational diabetes mellitus) 2007      Past Surgical History:   Procedure Laterality Date    CERVIX BIOPSY      COLPOSCOPY      INTRAUTERINE DEVICE INSERTION  05/2013    Mirena    INTRAUTERINE DEVICE INSERTION  07/10/2018    Mirena     INTRAUTERINE DEVICE REMOVAL  07/10/2018    SUPA  2005     Family History   Problem Relation Age of Onset    Heart Disease Maternal Aunt     Heart Disease Maternal Grandmother     Heart Disease Maternal Grandfather     Cancer Maternal Grandfather         prostate/lung    Other Paternal Grandfather         aneurysm    High Cholesterol Mother     Other Father         mass in his colon     Diabetes Father     Cancer Father         colon Social History     Tobacco Use    Smoking status: Former Smoker    Smokeless tobacco: Never Used   Substance Use Topics    Alcohol use: No     ALLERGIES:  No Known Allergies       Subjective     · Constitutional:  Negative for activity change, appetite change,unexpected weight change, chills, fever, and fatigue. · HENT: Negative for ear pain, sore throat,  Rhinorrhea, sinus pain, sinus pressure, congestion. · Eyes:  Negative for pain and discharge. · Respiratory:  Negative for chest tightness, shortness of breath, wheezing, and cough. · Cardiovascular:  Negative for chest pain, palpitations and leg swelling. · Gastrointestinal: Negative for abdominal pain, blood in stool, constipation,diarrhea, nausea and vomiting. · Endocrine: Negative for cold intolerance, heat intolerance, polydipsia, polyphagia and polyuria. · Genitourinary: Negative for difficulty urinating, dysuria, flank pain, frequency, hematuria and urgency. · Musculoskeletal: Negative for arthralgias, back pain, joint swelling, myalgias, neck pain and neck stiffness. · Skin: Negative for rash and wound. · Allergic/Immunologic: Negative for environmental allergies and food allergies. · Neurological:  Negative for dizziness, light-headedness, numbness and headaches. · Hematological:  Negative for adenopathy. Does not bruise/bleed easily. · Psychiatric/Behavioral: Negative for self-injury, sleep disturbance and suicidal ideas. Positive for anxiety and depression    Objective     PHYSICAL EXAM:   · Constitutional: Pilar Luis is oriented to person, place, and time. Vital signs are normal. Appears well-developed and well-nourished. · HEENT:   · Head: Normocephalic and atraumatic. Right Ear: Hearing and external ear normal.   TM and canal normal  · Left Ear: Hearing and external ear normal.  TM and canal normal  · Nose: Nares normal. Septum midline. No drainage or sinus tenderness.  Mucosa pink and moist  · Mouth/Throat: Oropharynx- No erythema, no exudate. Uvula midline, no erythema, no edema. Mucous membranes are pink and moist.   · Eyes:PERRL, EOMI, Conjunctiva normal, No discharge. · Neck: Full passive range of motion. Non-tender on palpation. Neck supple. No thyromegaly present. Trachea normal.  · Cardiovascular: Normal rate, regular rhythm, S1, S2, no murmur, no gallop, no friction rub, intact distal pulses. · Pulmonary/Chest: Breath sounds are clear throughout, No respiratory distress, No wheezing, No chest tenderness. Effort normal  · Musculoskeletal: Extremities appear regular and symmetric. No evident masses, lesions, foreign bodies, or other abnormalities. No edema. No tenderness on palpation. Joints are stable. Full ROM, strength and tone are within normal limits. · Lymphadenopathy: No lymphadenopathy noted. · Neurological: Alert and oriented to person, place, and time. Normal motor function, Normal sensory function, No focal deficits noted. He has normal strength. · Skin: Skin is warm, dry and intact. No obvious lesions on exposed skin  · Psychiatric: Normal mood and affect. Speech is normal and behavior is normal.     Nursing note and vitals reviewed. Blood pressure 122/80, pulse 92, temperature 97.1 °F (36.2 °C), temperature source Temporal, weight 208 lb 9.6 oz (94.6 kg), SpO2 99 %, not currently breastfeeding. Body mass index is 39.74 kg/m². Wt Readings from Last 3 Encounters:   09/23/20 208 lb 9.6 oz (94.6 kg)   09/23/20 206 lb (93.4 kg)   08/13/20 208 lb (94.3 kg)     BP Readings from Last 3 Encounters:   09/23/20 122/80   09/23/20 122/78   01/27/20 130/78       No results found for this visit on 09/23/20.     Completed Orders/Prescriptions   Orders Placed This Encounter   Medications    venlafaxine (EFFEXOR XR) 37.5 MG extended release capsule     Sig: Take 1 capsule by mouth daily     Dispense:  30 capsule     Refill:  1    DULoxetine (CYMBALTA) 30 MG extended release capsule     Sig: Take 1 capsule by mouth daily Dose increased 4/9/2020     Dispense:  30 capsule     Refill:  0               AssessmentPlan/Medical Decision Making     1. Annual physical exam  - reviewed Health Maintenance    2. Hyperglycemia  - Hemoglobin A1C; Future  - Comprehensive Metabolic Panel; Future  - CBC With Auto Differential; Future    3. Acquired hypothyroidism  - T4, Free; Future  - TSH; Future    4. Vitamin D deficiency  - Vitamin D 25 Hydroxy; Future    5. Anxiety and depression  - venlafaxine (EFFEXOR XR) 37.5 MG extended release capsule; Take 1 capsule by mouth daily  Dispense: 30 capsule; Refill: 1  - DULoxetine (CYMBALTA) 30 MG extended release capsule; Take 1 capsule by mouth daily Dose increased 4/9/2020  Dispense: 30 capsule; Refill: 0  - patient will call in 2 weeks for update and further instruction for weaning off of the cymbalta and increasing the Effexor  - [place] OFFICE OUTPATIENT VISIT 15 MINUTES      Return in about 1 month (around 10/23/2020) for anxiety and depression. 1.  Pillo Leon received counseling on the following healthy behaviors: nutrition, exercise and medication adherence  2. Patient given educational materials - see patient instructions  3. Was a self-tracking handout given in paper form or via import2? No  If yes, see orders or list here. 4.  Discussed use, benefit, and side effects of prescribed medications. Barriers to medication compliance addressed. All patient questions answered. Pt voiced understanding. 5.  Reviewed prior labs, imaging, consultation, follow up, and health maintenance  6. Continue current medications, diet and exercise. 7. Discussed use, benefit, and side effects of prescribed medications. Barriers to medication compliance addressed. All her questions were answered. Pt voiced understanding. Pillo Leon will continue current medications, diet and exercise.       Of the 30 minute duration appointment visit, Abdulkadir Yeung CNP spent at least 50% of the face-to-face time in counseling, explanation of diagnosis, planning of further management, and answering all questions. Signed:  Car Swartz CNP    This note is created with the assistance of a speech-recognition program.  While intending to generate a document that actually reflects the content of the visit, no guarantees can be provided that every mistake has been identified and corrected by editing.

## 2020-09-29 RX ORDER — LEVOTHYROXINE SODIUM 0.05 MG/1
TABLET ORAL
Qty: 90 TABLET | Refills: 3 | Status: SHIPPED | OUTPATIENT
Start: 2020-09-29 | End: 2021-09-30

## 2020-10-05 LAB — CYTOLOGY REPORT: NORMAL

## 2020-10-06 ENCOUNTER — PATIENT MESSAGE (OUTPATIENT)
Dept: FAMILY MEDICINE CLINIC | Age: 45
End: 2020-10-06

## 2020-10-06 RX ORDER — VENLAFAXINE HYDROCHLORIDE 75 MG/1
150 CAPSULE, EXTENDED RELEASE ORAL DAILY
Qty: 30 CAPSULE | Refills: 3 | Status: SHIPPED | OUTPATIENT
Start: 2020-10-06 | End: 2020-11-06 | Stop reason: SDUPTHER

## 2020-10-19 VITALS — WEIGHT: 208 LBS | BODY MASS INDEX: 34.61 KG/M2

## 2020-11-06 RX ORDER — VENLAFAXINE HYDROCHLORIDE 75 MG/1
150 CAPSULE, EXTENDED RELEASE ORAL DAILY
Qty: 30 CAPSULE | Refills: 5 | Status: SHIPPED | OUTPATIENT
Start: 2020-11-06 | End: 2021-02-08 | Stop reason: SDUPTHER

## 2020-11-06 NOTE — TELEPHONE ENCOUNTER
Maliha Trevino is calling to request a refill on the following medication(s):    Medication Request:  Requested Prescriptions     Pending Prescriptions Disp Refills    venlafaxine (EFFEXOR XR) 75 MG extended release capsule 30 capsule 3     Sig: Take 2 capsules by mouth daily       Last Visit Date (If Applicable):  1/88/1553 in office    Next Visit Date:    Visit date not found

## 2020-11-20 ENCOUNTER — HOSPITAL ENCOUNTER (OUTPATIENT)
Dept: MAMMOGRAPHY | Age: 45
Discharge: HOME | End: 2020-11-22
Payer: COMMERCIAL

## 2020-11-20 PROCEDURE — 77063 BREAST TOMOSYNTHESIS BI: CPT

## 2020-11-24 ENCOUNTER — E-VISIT (OUTPATIENT)
Dept: FAMILY MEDICINE CLINIC | Age: 45
End: 2020-11-24

## 2020-11-24 ASSESSMENT — PATIENT HEALTH QUESTIONNAIRE - PHQ9
4. FEELING TIRED OR HAVING LITTLE ENERGY: 1
7. TROUBLE CONCENTRATING ON THINGS, SUCH AS READING THE NEWSPAPER OR WATCHING TELEVISION: 3
5. POOR APPETITE OR OVEREATING: NOT AT ALL
SUM OF ALL RESPONSES TO PHQ QUESTIONS 1-9: 6
8. MOVING OR SPEAKING SO SLOWLY THAT OTHER PEOPLE COULD HAVE NOTICED. OR THE OPPOSITE - BEING SO FIDGETY OR RESTLESS THAT YOU HAVE BEEN MOVING AROUND A LOT MORE THAN USUAL: NOT AT ALL
8. MOVING OR SPEAKING SO SLOWLY THAT OTHER PEOPLE COULD HAVE NOTICED. OR THE OPPOSITE, BEING SO FIGETY OR RESTLESS THAT YOU HAVE BEEN MOVING AROUND A LOT MORE THAN USUAL: 0
7. TROUBLE CONCENTRATING ON THINGS, SUCH AS READING THE NEWSPAPER OR WATCHING TELEVISION: NEARLY EVERY DAY
SUM OF ALL RESPONSES TO PHQ9 QUESTIONS 1 & 2: 1
1. LITTLE INTEREST OR PLEASURE IN DOING THINGS: NOT AT ALL
6. FEELING BAD ABOUT YOURSELF - OR THAT YOU ARE A FAILURE OR HAVE LET YOURSELF OR YOUR FAMILY DOWN: SEVERAL DAYS
9. THOUGHTS THAT YOU WOULD BE BETTER OFF DEAD, OR OF HURTING YOURSELF: 0
6. FEELING BAD ABOUT YOURSELF - OR THAT YOU ARE A FAILURE OR HAVE LET YOURSELF OR YOUR FAMILY DOWN: 1
9. THOUGHTS THAT YOU WOULD BE BETTER OFF DEAD, OR OF HURTING YOURSELF: NOT AT ALL
SUM OF ALL RESPONSES TO PHQ QUESTIONS 1-9: 6
1. LITTLE INTEREST OR PLEASURE IN DOING THINGS: 0
3. TROUBLE FALLING OR STAYING ASLEEP: NOT AT ALL
2. FEELING DOWN, DEPRESSED OR HOPELESS: SEVERAL DAYS
4. FEELING TIRED OR HAVING LITTLE ENERGY: SEVERAL DAYS
5. POOR APPETITE OR OVEREATING: 0
10. IF YOU CHECKED OFF ANY PROBLEMS, HOW DIFFICULT HAVE THESE PROBLEMS MADE IT FOR YOU TO DO YOUR WORK, TAKE CARE OF THINGS AT HOME, OR GET ALONG WITH OTHER PEOPLE: SOMEWHAT DIFFICULT
SUM OF ALL RESPONSES TO PHQ QUESTIONS 1-9: 6
2. FEELING DOWN, DEPRESSED OR HOPELESS: 1

## 2020-11-24 ASSESSMENT — ANXIETY QUESTIONNAIRES
7. FEELING AFRAID AS IF SOMETHING AWFUL MIGHT HAPPEN: 0-NOT AT ALL
6. BECOMING EASILY ANNOYED OR IRRITABLE: NOT AT ALL
6. BECOMING EASILY ANNOYED OR IRRITABLE: 0-NOT AT ALL
7. FEELING AFRAID AS IF SOMETHING AWFUL MIGHT HAPPEN: NOT AT ALL
2. NOT BEING ABLE TO STOP OR CONTROL WORRYING: 0-NOT AT ALL
GAD7 TOTAL SCORE: 1
5. BEING SO RESTLESS THAT IT IS HARD TO SIT STILL: 0-NOT AT ALL
4. TROUBLE RELAXING: NOT AT ALL
2. NOT BEING ABLE TO STOP OR CONTROL WORRYING: NOT AT ALL
1. FEELING NERVOUS, ANXIOUS, OR ON EDGE: 1-SEVERAL DAYS
GAD7 TOTAL SCORE: 1
5. BEING SO RESTLESS THAT IT IS HARD TO SIT STILL: NOT AT ALL
1. FEELING NERVOUS, ANXIOUS, OR ON EDGE: SEVERAL DAYS
3. WORRYING TOO MUCH ABOUT DIFFERENT THINGS: NOT AT ALL
4. TROUBLE RELAXING: 0-NOT AT ALL
3. WORRYING TOO MUCH ABOUT DIFFERENT THINGS: 0-NOT AT ALL

## 2020-11-24 ASSESSMENT — PATIENT HEALTH QUESTIONNAIRE - GENERAL
HAVE YOU BEEN EXPERIENCING INVOLUNTARY WEIGHT GAIN OR LOSS: N
HAVE YOU BEEN EXPERIENCING ABDOMINAL DISCOMFORT, NAUSEA OR CHANGES IN YOUR BOWEL PATTERN: N
HAVE YOU BEEN EXPERIENCING VIVID DREAMS OR NIGHTMARES THAT INTERFERE WITH YOUR SLEEP: N
HAVE YOU BEEN EXPERIENCING HALLUCINATIONS OR CONFUSION: N
DO YOU HAVE A FASTER HEART RATE THAN USUAL, DOES YOUR HEART RATE FEEL IRREGULAR OR DO YOU FEEL LIKE YOUR HEART IS POUNDING AT REST: N
HAVE YOU BEEN EXPERIENCING AN UNUSUALLY DRY MOUTH: N
HAVE YOU BEEN EXPERIENCING UNEXPLAINED HIGH FEVERS OR EXCESSIVE SWEATING: N
HAVE YOU BEEN EXPERIENCING NEW OR WORSENING VISUAL CHANGES: N
SINCE YOUR LAST VISIT, HAVE YOU EXPERIENCED EPISODES OF FAINTING OR NEAR FAINTING: N
DO YOU HAVE ANY NEW RASHES OR UNEXPLAINED ITCHING OR SWELLING: N
HAVE YOU BEEN EXPERIENCING VERY LOW OR VERY HIGH MOODS: N
HAVE YOU BEEN EXPERIENCING NEW OR WORSENING MUSCLE TWITCHING, SHAKINESS, OR OTHER UNUSUAL CHANGES IN YOUR MUSCLE MOVEMENT: N
HAVE YOU BEEN EXPERIENCING NEW OR WORSENING DIFFICULTY WITH URINATION OR CHANGE IN URINARY PATTERN: N
HAVE YOU BEEN EXPERIENCING NEW OR WORSENING HEADACHES: N
HAVE YOU BEEN EXPERIENCING LIGHT HEADEDNESS, WOOZINESS, OR DIZZINESS, ESPECIALLY UPON STANDING FROM SITTING OR LYING POSITIONS: N
HAVE YOU BEEN EXPERIENCING RACING THOUGHTS OR IMPULSIVE BEHAVIOR: N
HAVE YOU BEEN EXPERIENCING NEW OR WORSENING PROBLEMS WITH YOUR SEXUAL FUNCTION: N

## 2021-02-08 DIAGNOSIS — F32.A ANXIETY AND DEPRESSION: ICD-10-CM

## 2021-02-08 DIAGNOSIS — F41.9 ANXIETY AND DEPRESSION: ICD-10-CM

## 2021-02-08 RX ORDER — VENLAFAXINE HYDROCHLORIDE 75 MG/1
150 CAPSULE, EXTENDED RELEASE ORAL DAILY
Qty: 90 CAPSULE | Refills: 0 | Status: SHIPPED | OUTPATIENT
Start: 2021-02-08 | End: 2021-02-11 | Stop reason: SDUPTHER

## 2021-02-08 NOTE — TELEPHONE ENCOUNTER
Lazaro Ohara is calling to request a refill on the following medication(s):    Medication Request:  Requested Prescriptions     Pending Prescriptions Disp Refills    venlafaxine (EFFEXOR XR) 75 MG extended release capsule 30 capsule 5     Sig: Take 2 capsules by mouth daily       Last Visit Date (If Applicable):  93/04/8391 In office    Next Visit Date:    Visit date not found

## 2021-02-08 NOTE — LETTER
JADE Sherwood - CNP  [facility]  [address] [place] [id number]  Dept: [phone]    2/8/2021    [address] [place] [id number]      Dear Lazaro Ohara    In addition to helping you feel better when you are sick, we are interested in preventing illness and injury in the first place. In the spirit of maintaining your good health, your record indicates that you are due for an appointment.        Please call [phone] to schedule     I look forward to seeing you soon     Sincerely,       Eligio Dunn CNP

## 2021-02-11 ENCOUNTER — TELEPHONE (OUTPATIENT)
Dept: FAMILY MEDICINE CLINIC | Age: 46
End: 2021-02-11

## 2021-02-11 DIAGNOSIS — F32.A ANXIETY AND DEPRESSION: ICD-10-CM

## 2021-02-11 DIAGNOSIS — F41.9 ANXIETY AND DEPRESSION: ICD-10-CM

## 2021-02-11 RX ORDER — VENLAFAXINE HYDROCHLORIDE 75 MG/1
150 CAPSULE, EXTENDED RELEASE ORAL DAILY
Qty: 180 CAPSULE | Refills: 0 | Status: SHIPPED | OUTPATIENT
Start: 2021-02-11 | End: 2021-06-02

## 2021-02-11 NOTE — TELEPHONE ENCOUNTER
-Pharmacist [facility]  States you filled Effexor 75 mg script for 2 caps qd for 45 days.     Jennifer Donovan states if you are doing 2 caps qd do 180 ct for 90 days or 1 cap qd Effexor 150 mg at 90 days    Jennifer Donovan [phone]

## 2021-03-01 ENCOUNTER — OFFICE VISIT (OUTPATIENT)
Dept: PODIATRY | Age: 46
End: 2021-03-01
Payer: COMMERCIAL

## 2021-03-01 VITALS — BODY MASS INDEX: 40.25 KG/M2 | HEIGHT: 60 IN | TEMPERATURE: 97.9 F | WEIGHT: 205 LBS

## 2021-03-01 DIAGNOSIS — M79.672 LEFT FOOT PAIN: ICD-10-CM

## 2021-03-01 DIAGNOSIS — M79.671 INTRACTABLE RIGHT HEEL PAIN: Primary | ICD-10-CM

## 2021-03-01 PROCEDURE — 99203 OFFICE O/P NEW LOW 30 MIN: CPT | Performed by: PODIATRIST

## 2021-03-01 NOTE — PROGRESS NOTES
[address] [facility] [address] [id number] [facility] [address]  Dept: [phone]    NEW PATIENT PROGRESS NOTE  Date of patient's visit: 3/1/2021  Patient's Name:  Travis Chery YOB: 1975            Patient Care Team:  JADE White CNP as PCP - General (Nurse Practitioner)  JADE White CNP as PCP - [facility] Empaneled Provider        Chief Complaint   Patient presents with    New Patient    Foot Pain     right heel pain x 3 months and left arch         HPI:   Travis Chery is a 40 y.o. female who presents to the office today complaining of right heel pain, left foot pain at the arch and top of foot. Symptoms began 3 month(s) ago. Patient relates pain is Present. Pain is rated 4 out of 10 and is described as intermittent. Treatments prior to today's visit include: none. Currently denies F/C/N/V. Pt's primary care physician is JADE White CNP last seen November 24 2020. No Known Allergies    Past Medical History:   Diagnosis Date    Abnormal Pap smear 2005    Anxiety     GDM (gestational diabetes mellitus) 2007       Prior to Admission medications    Medication Sig Start Date End Date Taking?  Authorizing Provider   venlafaxine (EFFEXOR XR) 75 MG extended release capsule Take 2 capsules by mouth daily 2/11/21 5/12/21  JADE White CNP   levothyroxine (SYNTHROID) 50 MCG tablet TAKE 1 TABLET BY MOUTH ONE TIME A DAY 9/29/20   JADE White CNP       Past Surgical History:   Procedure Laterality Date    CERVIX BIOPSY      COLPOSCOPY      INTRAUTERINE DEVICE INSERTION  05/2013    Mirena    INTRAUTERINE DEVICE INSERTION  07/10/2018    Mirena     INTRAUTERINE DEVICE REMOVAL  07/10/2018    SUPA  2005       Family History   Problem Relation Age of Onset    Heart Disease Maternal Aunt     Heart Disease Maternal Grandmother     Heart Disease Maternal Grandfather     Cancer Maternal Grandfather         prostate/lung Neurological: Sensation intact to light touch to level of digits, Bilateral.  Protective sensation intact 10/10 sites via 5.07/10g [place] Monofilament, Bilateral.  negative Tinel's, Bilateral.  negative Valleix sign, Bilateral.      Integument: Warm, dry, supple, Bilateral.  Open lesion absent, Bilateral.  Interdigital maceration absent to web spaces 1-4, Bilateral.  Nails are normal in length, thickness and color 1-5 bilateral.  Fissures absent, Bilateral.         Radiographs:  3 views right foot: possilbe stress fracture of the right heel best seen on the lateral oblique    3 views left foot:  Plantar heel spur with os tibialis externum at the navicular tuberoisty   Asessment: Patient is a 40 y.o. female with:    Diagnosis Orders   1. Intractable right heel pain  XR FOOT RIGHT (MIN 3 VIEWS)   2. Left foot pain  XR FOOT LEFT (MIN 3 VIEWS)         Plan: Patient examined and evaluated. Current condition and treatment options discussed in detail. Discussed conservative and surgical options with the patient. Advised pt to her condition. Pt is to avoid any exercises where she is hititng the heel on the ground. To to continue to wear her CAM boot    RICE therapy. Arch Pain: Exercises  Introduction  Here are some examples of exercises for you to try. The exercises may be suggested for a condition or for rehabilitation. Start each exercise slowly. Ease off the exercises if you start to have pain. You will be told when to start these exercises and which ones will work best for you. How to do the exercises  Plantar fascia stretch    1. Sit in a chair and put your affected foot on your other knee. 2. Hold the heel of your foot in one hand, and grasp your toes with the other hand. 3. Pull on your heel (toward your body), and at the same time pull your toes back with your other hand. 4. You should feel a stretch along the bottom of your foot. 5. Hold 15 to 30 seconds. 6. Repeat 2 to 4 times. Plantar fascia stretch (kneeling)    1. Get on your hands and knees on the floor. Keep your heels pointing up and the balls of your feet and your toes on the floor. 2. Slowly sit back toward your ankles. 3. If this is too hard, you can try doing it one leg at a time. Stand up, and then kneel on one knee and keep the other leg forward. Place the foot of your forward leg flat on the ground and bend that knee. The heel on the leg still behind you should point up. The ball and toes of that foot should be on the floor. Sit back toward that ankle. 4. Hold 15 to 30 seconds. 5. Repeat 2 to 4 times. Switch legs if you are doing this one leg at a time. Plantar fascia self-massage    1. Sit in a chair. 2. Place your affected foot on a firm, tube-shaped object, such as a can or water bottle. 3. Roll your foot back and forth over the object to massage the bottom of your foot. 4. If you want to do ice massage, fill a water bottle about three-fourths of the way full and freeze before using. 5. Continue for 2 to 5 minutes. Bilateral calf stretch (knees straight)    1. Place a book on the floor a few inches from a wall or countertop, and put the balls of your feet on it. Your heels should be on the floor. The book needs to be thick enough so that you can feel a gentle stretch in each calf. If you are not steady on your feet, hold on to a chair, counter, or wall while you do this stretch. 2. Keep your knees straight, and lean forward until you feel a stretch in each calf. 3. To get more stretch, add another book or use a thicker book, such as a phone book, a dictionary, or an encyclopedia. 4. Hold the stretch for at least 15 to 30 seconds. 5. Repeat 2 to 4 times.      Bilateral calf stretch (knees bent) 1. Place a book on the floor a few inches from a wall or countertop, and put the balls of your feet on it. Your heels should be on the floor. The book needs to be thick enough so that you can feel a gentle stretch in each calf. If you are not steady on your feet, hold on to a chair, counter, or wall while you do this stretch. 2. Bend your knees, and lean forward until you feel a stretch in each calf. 3. To get more stretch, add another book or use a thicker book, such as a phone book, a dictionary, or an encyclopedia. 4. Hold the stretch for at least 15 to 30 seconds. 5. Repeat 2 to 4 times. [place] pick-ups    1. Put some marbles on the floor next to a cup.  2. Sit down, and use the toes of your affected foot to lift up one marble from the floor at a time. Then try to put the marble in the cup.  3. Repeat 8 to 12 times. Towel scrunches    1. Sit down, and place your affected foot on a towel on the floor. You may also do this with both feet on the towel. 2. Scrunch the towel toward you with your toes. Then use your toes to push the towel back into place. 3. Repeat 8 to 12 times. Heel raises on a step    1. Stand on the bottom step of a staircase, facing up toward the stairs. Put the balls of your feet on the step. If you are not steady on your feet, hold on to the banister or wall. 2. Keeping both knees straight, slowly lift your heels above the step so that you are standing on your toes. Then slowly lower your heels below the step and toward the floor. 3. Return to the starting position, with your feet even with the step. 4. Repeat 8 to 12 times. Follow-up care is a key part of your treatment and safety. Be sure to make and go to all appointments, and call your doctor if you are having problems. It's also a good idea to know your test results and keep a list of the medicines you take. Where can you learn more? Go to https://[email or web address].[email or web address]. org and sign in to your [employer]Greet account. Enter H119 in the [place] box to learn more about \"Arch Pain: Exercises. \"     If you do not have an account, please click on the \"Sign Up Now\" link. Current as of: September 20, 2018  Content Version: 12.1  © 6939-5527 [employer], [employer]. Care instructions adapted under license by [facility] ([facility]). If you have questions about a medical condition or this instruction, always ask your healthcare professional. [address] any warranty or liability for your use of this information. Heel Spurs/Plantar Fasciitis      1. Taping- keep on for 5-7 days if possible and DO NOT get it wet. If you see redness or feel itching, then take off the taping: this may be an indication that you are allergic to the tap. 2. Injection- contains a small amount of cortisone to reduce your inflammation. It is possible that after 24 hours you may feel a small increase in pain at the injection site. This is \"Steroid Flare\" and will subside after 24 hours. 3. Anti-inflammatory medications- Take as prescribed      4. Ice massage- There are a number of ways to ice your foot/feet. The best way is to place a full water bottle in the freezer and then roll the bottom of your foot on it up to 3 times a day. 5. Heel cord stretching- stretch at least twice a day. See below    Wall Stretch        Affected Heel flat on floor and keep that knee straight  Bend the other knee and lean into the wall  Hold that stretched position for 20-30 seconds  Repeat 5 times    DO NOT: Bounce or jerk back and forth while doing the stretch  DO Not: Point toes out to the side                  Towel Stretch      Place towel under ball of foot  Pull foot towards you and hold for 5 seconds  Then push foot down and hold for 5 seconds  Repeat this 5-10 times      6. Wear good supportive shoes with adequate shock absorption. 7. Orthotics: We recommend custom molded orthotics and we can call your insurance to see if this is a covered benefit for you. However, if they are not covered, we carry over-the- counter orthotics called Powersteps. They cost $43.00 and can be purchased with cash, check or charge at the . 8. Physical Therapy- will be prescribed as needed. .  Verbal and written instructions given to patient. Contact office with any questions/problems/concerns. RTC in 4week(s) with new x rays.     3/1/2021    Electronically signed by Hulon Saint, DPM on 3/1/2021 at 10:10 AM  3/1/2021

## 2021-03-29 ENCOUNTER — OFFICE VISIT (OUTPATIENT)
Dept: PODIATRY | Age: 46
End: 2021-03-29
Payer: COMMERCIAL

## 2021-03-29 VITALS — BODY MASS INDEX: 40.25 KG/M2 | TEMPERATURE: 97.9 F | HEIGHT: 60 IN | WEIGHT: 205 LBS

## 2021-03-29 DIAGNOSIS — M79.672 LEFT FOOT PAIN: Primary | ICD-10-CM

## 2021-03-29 DIAGNOSIS — M67.02 CONTRACTURE OF LEFT ACHILLES TENDON: ICD-10-CM

## 2021-03-29 PROCEDURE — 99213 OFFICE O/P EST LOW 20 MIN: CPT | Performed by: PODIATRIST

## 2021-03-29 NOTE — PROGRESS NOTES
30 [facility] [address] [id number] [facility] [address]  Dept: [phone]    RETURN PATIENT PROGRESS NOTE  Date of patient's visit: 3/29/2021  Patient's Name:  Nilson Tejada YOB: 1975            Patient Care Team:  JADE Messina CNP as PCP - General (Nurse Practitioner)  JADE Messina CNP as PCP - [facility]       Nadia Gutiérrez 40 y.o. female that presents for follow-up of   Chief Complaint   Patient presents with    Foot Pain     Pt's primary care physician is JADE Messina CNP last seen November 24 2020  Symptoms began 3 month(s) ago and are decreased . Patient relates pain is Absent . Pain is rated 0 out of 10 and is described as none. Treatments prior to today's visit include: previous podiatry treatment, cam boot. Currently denies F/C/N/V. No Known Allergies    Past Medical History:   Diagnosis Date    Abnormal Pap smear 2005    Anxiety     GDM (gestational diabetes mellitus) 2007       Prior to Admission medications    Medication Sig Start Date End Date Taking? Authorizing Provider   venlafaxine (EFFEXOR XR) 75 MG extended release capsule Take 2 capsules by mouth daily 2/11/21 5/12/21 Yes JADE Fair CNP   levothyroxine (SYNTHROID) 50 MCG tablet TAKE 1 TABLET BY MOUTH ONE TIME A DAY 9/29/20  Yes JADE Roth CNP       Review of Systems    Review of Systems:  History obtained from chart review and the patient  General ROS: negative for - chills, fatigue, fever, night sweats or weight gain  Constitutional: Negative for chills, diaphoresis, fatigue, fever and unexpected weight change. Musculoskeletal: Positive for arthralgias, gait problem and joint swelling. Neurological ROS: negative for - behavioral changes, confusion, headaches or seizures. Negative for weakness and numbness. Dermatological ROS: negative for - mole changes, rash  Cardiovascular: Negative for leg swelling. only your ankle, point your foot back toward your nose (while keeping knees straight). Continue until you feel discomfort or can't tilt it back any further. 2. Hold this position for 15 seconds. 3. Return to neutral position and repeat 5 times. Non-Weight Bearing Plantar Flexion     Plantar flexion is the motion of pointing your ankle down and away from you. Here is how to gain ankle plantarflexion range of motion (ROM):   1. Moving only your ankle, point your foot forward (while keeping knees straight). Continue until you feel discomfort or can't move it any further. 2. Hold this position for 15 seconds. 3. Return to neutral position. Non-Weight Bearing Inversion     Inversion refers to the motion of pointing your ankle inwards towards the midline of your body. Here is how you gain more ankle inversion:   1. Moving only your ankle and keeping your toes pointed up, turn your foot inward, so the sole is facing your other leg. Continue until either discomfort is felt or you can no longer turn your foot inward. 2. Hold this position for 15 seconds. 3. Return to neutral position. Non-Weight Bearing Eversion     Eversion is the motion of moving your ankle to the outside or lateral part of your leg. Perform this exercise to gain eversion motion in your ankle:   1. Moving only your ankle and keeping your toes pointed up, turn your foot outward, away from your other leg. Continue until either discomfort is felt or you can no longer turn your foot outward. 2. Hold this position for 15 seconds. 3. Return to neutral position. The Alphabet   A great way physical therapists help their patients gain ankle mobility in all directions is to perform the ankle alphabet. This can get your ankle moving in all directions. Here is how to do the exercise:   1. Sit on a chair with your foot dangling in the air or on a bed with your foot hanging off the edge.   2. Draw the alphabet one letter at a time by moving the injured ankle and using the great toe as your \"pencil. \"  Eversion Isometrics     Strengthening exercises are usually started with isometric contractionsno motion occurs around your ankle joint during the muscle contraction. They may be done early after injury or surgery to start to gentlyand safelyadd force to the muscles that support your ankle. To do the exericse:   1. While seated, place the outside of the injured foot against a table leg or closed door. 2. Push outward with your foot into the object your foot is against (your ankle joint should not move) causing a contraction of your muscles. 3. Hold this muscle contraction for 15 seconds. 4. Relax for 10 seconds. Inversion Isometrics     This isometric exercise focuses on inversion:   1. While seated, place the inside of the injured foot against a table leg or closed door. 2. Push inward with your foot into the object your foot is against (your ankle joint should not move) causing a contraction of your muscles. 3. Hold this muscle contraction for 15 seconds. 4. Relax for 10 seconds. Resisted Strengthening Dorsiflexion     Resisted strengthening exercises should be performed with a Theraband providing resistance to your movements. These exercises will also work to strengthen the muscles around your ankle. This will provide added support to the joint. Perform each exercise 10 to 15 times in a row. Never tie a Theraband (or anything else) around your foot, ankle, or leg in a way that would restrict blood flow. Ankle dorsiflexion with resistance helps to strengthen your anterior tibialis muscle. Here is how you do it:   1. Moving only your ankle, point your foot back toward your nose (while keeping knees straight). Continue until you feel discomfort or can't tilt it back any further. 2. Hold this position for 2 seconds and slowly release. 3. Return to the neutral position, and then repeat the exercise.   Resisted Strengthening Plantar Flexion     Resisted ankle plantar flexion helps to strengthen your calf muscles and Achilles tendon. To do the exercise:   1. Moving only your ankle, point your foot forward (while keeping knees straight). You may feel tightness in your calf muscle behind your lower leg. Continue until you feel discomfort or can't move it any further. 2. Hold this position for 2 seconds. 3. Return to neutral position. Resisted Strengthening Inversion     This exercise will provide strengthening as well:   1. Moving only your ankle and keeping your toes pointed up, turn your foot inward, so the sole is facing your other leg. Continue until either discomfort is felt or you can no longer turn your foot inward. 2. Hold this position for 2 seconds. 3. Return to neutral position. Resisted Strengthening Eversion     Now strengthen in the other direction:   1. Moving only your ankle and keeping your toes pointed up, turn your foot outward, away from your other leg. Continue until either discomfort is felt or you can no longer turn your foot outward. 2. Hold this position for 2 seconds. 3. Return to neutral position. Partial Weight-Bearing Seated Calf Raises     These partial weight-bearing exercises will help put more weight on the injured ankle as well as strengthen the muscles around it. Each one should be performed 10 times in a row:   1. Sit in a chair with the injured foot on the floor. 2. Lift your heel as far as possible while keeping your toes on the floor. 3. Return heel to the floor. Partial Weight-Bearing Standing Weight Shift     Sometimes after injury, your doctor will have you limit the amount of weight you can put through your lower extremity. This can help protect it as things are healing. As you heal, your PT may guide you in increasing weight bearing through your injured ankle. Weight shifts are the perfect exercise to do for this. To do the exercise:   1. Stand upright while holding onto a stable object.   2. Shift some of your weight onto the injured foot. 3. Hold the position for 15 seconds. 4. Relax and put your weight back onto your uninjured foot. Full Weight-Bearing Single Leg Stance     These exercises will help put more weight on the injured foot. You should be sure that your ankle can tolerate the pressure that you are putting upon it. Perform each one 10 times in a row:   1. Stand on the injured foot while lifting the uninjured foot off the ground. 2. Hold the position for 15 seconds. 3. Relax and put your weight back onto your uninjured foot. Checking in with your PT may be necessary to be sure you are doing the right exercises for your ankle. Full Weight-Bearing Standing Calf Raises     Once you are cleared for full weight bearing, you may do these calf raises:   1. Stand on the injured foot while lifting the uninjured foot off the ground. 2. Raise up, standing only on the ball of the injured foot and lifting your heel off the ground. 3. Hold the position for 15 seconds. 4. Relax and put your weight back onto your uninjured foot. Full Weight-Bearing Lateral Stepping     Increase the speed of this exercise as your healing progresses:   1. Place a rolled towel or short object on the ground to the side of your injured foot. 2. Step over the towel with the injured foot and remain on that foot. 3. Then bring the uninjured foot over the object and stand on both feet. 4. Step back over the towel with the uninjured foot and remain on that foot. 5. Then bring the injured foot back over the towel and stand on both feet. Full Weight-Bearing Lateral Jump     This exercise starts to incorporate plyometrics into your rehab routine, which can help you get back to running and sports. Increase the speed of this exercise as your healing progresses:   1. Place a rolled towel or short object on the ground to the side of your injured foot. 2. Hop over the towel and land on the injured foot.   3. Then hop back over the towel and land on the uninjured foot. Single Leg Stance on a Towel     Injury to ankles can often result in decreased balance ability. 2? Towards the end of rehabilitation, performing balance activities is an important way to prevent future injury. Perform this exercise 10 times in a row:   1. Fold a towel into a small rectangle and place on the ground. 2. Stand with the injured foot on the towel. 3. Lift the uninjured leg off the ground standing only on the towel with the injured leg. 4. Hold for 15 seconds. (As balance improves, increase stance time on injured leg up to 45 seconds.)  5. Return your uninjured foot to the floor. You can increase the challenge by standing on more unsteady surfaces like a BOSU or wobble board. Your PT may also have you use a BAPS board while working on balance exercises. .  Verbal and written instructions given to patient. Contact office with any questions/problems/concerns. No orders of the defined types were placed in this encounter. No orders of the defined types were placed in this encounter. RTC in 2month(s)    All labs were reviewed and all imagining including the above findings were reviewed PRIOR to the patients arrival and with the patient today. Previous patient encounter was reviewed. Encounters from the patients other medical providers were reviewed and noted. Time was spent educating the patient and their families/caregivers on proper care of the feet and ankles. All the above diagnosis were addressed at todays visit and all questions were answered. A total of 28 minutes was spent with this patients encounter which included charting after the patients visit  .     3/29/2021      Electronically signed by Jorge Villafana DPM on 3/29/2021 at 11:30 AM  3/29/2021

## 2021-06-01 DIAGNOSIS — F41.9 ANXIETY AND DEPRESSION: ICD-10-CM

## 2021-06-01 DIAGNOSIS — F32.A ANXIETY AND DEPRESSION: ICD-10-CM

## 2021-06-02 NOTE — TELEPHONE ENCOUNTER
Missy Villela is calling to request a refill on the following medication(s):    Medication Request:  Requested Prescriptions     Pending Prescriptions Disp Refills    venlafaxine (EFFEXOR XR) 75 MG extended release capsule [Pharmacy Med Name: VENLAFAXINE HCL ER 75MG CP24] 180 capsule 0     Sig: TAKE TWO CAPSULES BY MOUTH EVERY DAY       Last Visit Date (If Applicable):  82/82/9796    Next Visit Date:    6/10/2021

## 2021-06-03 RX ORDER — VENLAFAXINE HYDROCHLORIDE 75 MG/1
CAPSULE, EXTENDED RELEASE ORAL
Qty: 60 CAPSULE | Refills: 0 | Status: SHIPPED | OUTPATIENT
Start: 2021-06-03 | End: 2021-06-10 | Stop reason: SDUPTHER

## 2021-06-10 ENCOUNTER — OFFICE VISIT (OUTPATIENT)
Dept: FAMILY MEDICINE CLINIC | Age: 46
End: 2021-06-10
Payer: COMMERCIAL

## 2021-06-10 VITALS
OXYGEN SATURATION: 97 % | BODY MASS INDEX: 40.7 KG/M2 | HEART RATE: 94 BPM | DIASTOLIC BLOOD PRESSURE: 78 MMHG | SYSTOLIC BLOOD PRESSURE: 130 MMHG | TEMPERATURE: 96.7 F | WEIGHT: 208.4 LBS

## 2021-06-10 DIAGNOSIS — E03.9 ACQUIRED HYPOTHYROIDISM: ICD-10-CM

## 2021-06-10 DIAGNOSIS — Z00.01 ENCOUNTER FOR WELL ADULT EXAM WITH ABNORMAL FINDINGS: Primary | ICD-10-CM

## 2021-06-10 DIAGNOSIS — E66.01 MORBID OBESITY WITH BMI OF 40.0-44.9, ADULT (HCC): ICD-10-CM

## 2021-06-10 DIAGNOSIS — Z13.220 SCREENING CHOLESTEROL LEVEL: ICD-10-CM

## 2021-06-10 DIAGNOSIS — F32.A ANXIETY AND DEPRESSION: ICD-10-CM

## 2021-06-10 DIAGNOSIS — F41.9 ANXIETY AND DEPRESSION: ICD-10-CM

## 2021-06-10 PROCEDURE — 99213 OFFICE O/P EST LOW 20 MIN: CPT | Performed by: NURSE PRACTITIONER

## 2021-06-10 PROCEDURE — G0444 DEPRESSION SCREEN ANNUAL: HCPCS | Performed by: NURSE PRACTITIONER

## 2021-06-10 PROCEDURE — 99396 PREV VISIT EST AGE 40-64: CPT | Performed by: NURSE PRACTITIONER

## 2021-06-10 RX ORDER — VENLAFAXINE HYDROCHLORIDE 150 MG/1
150 CAPSULE, EXTENDED RELEASE ORAL DAILY
Qty: 90 CAPSULE | Refills: 0 | Status: SHIPPED | OUTPATIENT
Start: 2021-06-10 | End: 2021-09-19 | Stop reason: SDUPTHER

## 2021-06-10 SDOH — ECONOMIC STABILITY: FOOD INSECURITY: WITHIN THE PAST 12 MONTHS, THE FOOD YOU BOUGHT JUST DIDN'T LAST AND YOU DIDN'T HAVE MONEY TO GET MORE.: NEVER TRUE

## 2021-06-10 SDOH — ECONOMIC STABILITY: FOOD INSECURITY: WITHIN THE PAST 12 MONTHS, YOU WORRIED THAT YOUR FOOD WOULD RUN OUT BEFORE YOU GOT MONEY TO BUY MORE.: NEVER TRUE

## 2021-06-10 ASSESSMENT — SOCIAL DETERMINANTS OF HEALTH (SDOH): HOW HARD IS IT FOR YOU TO PAY FOR THE VERY BASICS LIKE FOOD, HOUSING, MEDICAL CARE, AND HEATING?: NOT HARD AT ALL

## 2021-06-10 ASSESSMENT — PATIENT HEALTH QUESTIONNAIRE - PHQ9
1. LITTLE INTEREST OR PLEASURE IN DOING THINGS: 0
2. FEELING DOWN, DEPRESSED OR HOPELESS: 2
SUM OF ALL RESPONSES TO PHQ QUESTIONS 1-9: 2
SUM OF ALL RESPONSES TO PHQ9 QUESTIONS 1 & 2: 2
SUM OF ALL RESPONSES TO PHQ QUESTIONS 1-9: 2
SUM OF ALL RESPONSES TO PHQ QUESTIONS 1-9: 2

## 2021-06-10 NOTE — PROGRESS NOTES
INTRAUTERINE DEVICE INSERTION  07/10/2018    Mirena     INTRAUTERINE DEVICE REMOVAL  07/10/2018    SUPA  2005     Family History   Problem Relation Age of Onset    Heart Disease Maternal Aunt     Heart Disease Maternal Grandmother     Heart Disease Maternal Grandfather     Cancer Maternal Grandfather         prostate/lung    Other Paternal Grandfather         aneurysm    High Cholesterol Mother     Other Father         mass in his colon     Diabetes Father     Cancer Father         colon     Social History     Tobacco Use    Smoking status: Former Smoker    Smokeless tobacco: Never Used   Substance Use Topics    Alcohol use: No     ALLERGIES:  No Known Allergies       Subjective     · Constitutional:  Negative for activity change, appetite change,unexpected weight change, chills, fever, and fatigue. · HENT: Negative for ear pain, sore throat,  Rhinorrhea, sinus pain, sinus pressure, congestion. · Eyes:  Negative for pain and discharge. · Respiratory:  Negative for chest tightness, shortness of breath, wheezing, and cough. · Cardiovascular:  Negative for chest pain, palpitations and leg swelling. · Gastrointestinal: Negative for abdominal pain, blood in stool, constipation,diarrhea, nausea and vomiting. · Endocrine: Negative for cold intolerance, heat intolerance, polydipsia, polyphagia and polyuria. · Genitourinary: Negative for difficulty urinating, dysuria, flank pain, frequency, hematuria and urgency. · Musculoskeletal: Negative for arthralgias, back pain, joint swelling, myalgias, neck pain and neck stiffness. · Skin: Negative for rash and wound. · Allergic/Immunologic: Negative for environmental allergies and food allergies. · Neurological:  Negative for dizziness, light-headedness, numbness and headaches. · Hematological:  Negative for adenopathy. Does not bruise/bleed easily. · Psychiatric/Behavioral: Negative for self-injury, sleep disturbance and suicidal ideas. Positive for anxiety and depression    Objective     PHYSICAL EXAM:   · Constitutional: Janeth Bautista is oriented to person, place, and time. Vital signs are normal. Appears well-developed and well-nourished. · HEENT:   · Head: Normocephalic and atraumatic. Right Ear: Hearing and external ear normal. TM normal  Canal normal  · Left Ear: Hearing and external ear normal. TM normal Canal normal  · Nose: Nares normal. Septum midline. No drainage or sinus tenderness. Mucosa pink and moist  · Mouth/Throat: Oropharynx- No erythema, no exudate. Uvula midline, no erythema, no edema. Mucous membranes are pink and moist.   · Eyes:PERRL, EOMI, Conjunctiva normal, No discharge. · Neck: Full passive range of motion. Non-tender on palpation. Neck supple. No thyromegaly present. Trachea normal.  · Cardiovascular: Normal rate, regular rhythm, S1, S2, no murmur, no gallop, no friction rub, intact distal pulses. No carotid bruit. No lower extremity edema  · Pulmonary/Chest: Breath sounds are clear throughout, No respiratory distress, No wheezing, No chest tenderness. Effort normal  · Abdominal: Soft. Normal appearance, bowel sounds are present and normoactive. There is no hepatosplenomegaly. There is no tenderness. There is no CVA tenderness. · Musculoskeletal: Extremities appear regular and symmetric. No evident masses, lesions, foreign bodies, or other abnormalities. No edema. No tenderness on palpation. Joints are stable. Full ROM, strength and tone are within normal limits. · Lymphadenopathy: No lymphadenopathy noted. · Neurological: Alert and oriented to person, place, and time. Normal motor function, Normal sensory function, No focal deficits noted. He has normal strength. · Skin: Skin is warm, dry and intact. No obvious lesions on exposed skin  · Psychiatric: Normal mood and affect.  Speech is normal and behavior is normal. Patient cried intermittently during the visit - states she doesn't know why she's crying here.    Nursing note and vitals reviewed. Blood pressure 130/78, pulse 94, temperature 96.7 °F (35.9 °C), temperature source Temporal, weight 208 lb 6.4 oz (94.5 kg), SpO2 97 %, not currently breastfeeding. Body mass index is 40.7 kg/m². Wt Readings from Last 3 Encounters:   06/10/21 208 lb 6.4 oz (94.5 kg)   03/29/21 205 lb (93 kg)   03/01/21 205 lb (93 kg)     BP Readings from Last 3 Encounters:   06/10/21 130/78   09/23/20 122/80   09/23/20 122/78       No results found for this visit on 06/10/21. Completed Orders/Prescriptions   Orders Placed This Encounter   Medications    venlafaxine (EFFEXOR XR) 150 MG extended release capsule     Sig: Take 1 capsule by mouth daily     Dispense:  90 capsule     Refill:  0     Patient due for appointment               AssessmentPlan/Medical Decision Making     1. Encounter for well adult exam with abnormal findings    2. Anxiety and depression  - encouraged patient to f/u with counselor  - Hellen Jordan With Auto Differential; Future  - Comprehensive Metabolic Panel; Future  - venlafaxine (EFFEXOR XR) 150 MG extended release capsule; Take 1 capsule by mouth daily  Dispense: 90 capsule; Refill: 0  - NE OFFICE/OUTPATIENT ESTABLISHED LOW MDM 20-29 MIN  - NE DEPRESSION SCREEN ANNUAL    3. Acquired hypothyroidism  - CBC With Auto Differential; Future  - Comprehensive Metabolic Panel; Future  - TSH; Future  - T4, Free; Future  - NE OFFICE/OUTPATIENT ESTABLISHED LOW MDM 20-29 MIN    4. Screening cholesterol level  - Lipid, Fasting; Future    5. Morbid obesity with BMI of 40.0-44.9, adult ([facility] [address]  - Hemoglobin A1C; Future      Return in about 6 months (around 12/10/2021), or if symptoms worsen or fail to improve, for anxiety and depression. 1.  Leny Lamb received counseling on the following healthy behaviors: nutrition, exercise and medication adherence  2. Patient given educational materials - see patient instructions  3.   Was a self-tracking handout given in paper form or via [employer]? No  If yes, see orders or list here. 4.  Discussed use, benefit, and side effects of prescribed medications. Barriers to medication compliance addressed. All patient questions answered. Pt voiced understanding. 5.  Reviewed prior labs, imaging, consultation, follow up, and health maintenance  6. Continue current medications, diet and exercise. 7. Discussed use, benefit, and side effects of prescribed medications. Barriers to medication compliance addressed. All her questions were answered. Pt voiced understanding. Suzanne Rowley will continue current medications, diet and exercise. Of the 35 minute duration appointment visit, Kenroy Bailey CNP spent at least 50% of the face-to-face time in counseling, explanation of diagnosis, planning of further management, and answering all questions. Signed:  Kenroy Bailey CNP    This note is created with the assistance of a speech-recognition program.  While intending to generate a document that actually reflects the content of the visit, no guarantees can be provided that every mistake has been identified and corrected by editing.

## 2021-08-03 ENCOUNTER — OFFICE VISIT (OUTPATIENT)
Dept: BEHAVIORAL/MENTAL HEALTH CLINIC | Age: 46
End: 2021-08-03
Payer: COMMERCIAL

## 2021-08-03 DIAGNOSIS — F41.9 ANXIETY DISORDER, UNSPECIFIED TYPE: Primary | ICD-10-CM

## 2021-08-03 PROCEDURE — 90791 PSYCH DIAGNOSTIC EVALUATION: CPT | Performed by: SOCIAL WORKER

## 2021-08-05 NOTE — PROGRESS NOTES
ADULT BEHAVIORAL HEALTH ASSESSMENT  MILAD Wetzel  Licensed Independent      Visit Date: 8/3/2021   Time of appointment: 4pm   Time spent with Patient: 55 minutes. This is patient's first appointment. Reason for Consult:  New Patient, Anxiety, and Stress     PCP: JADE Segura CNP      Pt and/or guardian was educated on the model of service to include a short-term intervention focused approach and as well as the limits of confidentiality (i.e. abuse reporting, suicide intervention, etc.). Pt and/or guardian indicated understanding. Pt and/or guardian provided informed consent for the behavioral health program.  Visit completed in person. Patient presented alone. Patient Location: [facility], [facility]  Provider Location: [facility] office, [address] [place] [address] Avenue is a 40 y.o. female who presents for new evaluation and treatment of anxiety and stress. She reports the following symptoms: feeling nervous, anxious, or on edge, racing worry thoughts, excessive anxiety and worry about specific stressors, inability to stop or control worry, avoidance of situations that provoke fear and anxiety and relationship issues. Pt reports frequently worrying about how others perceive her, being very sensitive and taking things personally, perfectionism, lacking confidence and a people pleaser. Pt reports that when she perceives a negative interaction has occurred it affects her entire day and makes it very difficulty to focus on other things. Pt reports always feeling she has done something wrong and her anxiety then becomes agitation toward others. Symptoms are causing impairment in her employment. Onset of symptoms was approximately several years ago. 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   Stressors contributing to the presenting problem include: on the job issues with superior and peers     Frank Reeves reports that her main concern and focus for the referral to [facility] is to reduce pattern of personalizing interactions and behaviors of others and be able to relax and feel more confident at work. CURRENT MEDICATIONS    Current Outpatient Medications:     venlafaxine (EFFEXOR XR) 150 MG extended release capsule, Take 1 capsule by mouth daily, Disp: 90 capsule, Rfl: 0    levothyroxine (SYNTHROID) 50 MCG tablet, TAKE 1 TABLET BY MOUTH ONE TIME A DAY, Disp: 90 tablet, Rfl: 3     FAMILY MEDICAL/MH HISTORY   Her family history includes Cancer in her father and maternal grandfather; Diabetes in her father; Heart Disease in her maternal aunt, maternal grandfather, and maternal grandmother; High Cholesterol in her mother; Other in her father and paternal grandfather. PATIENT [address] reports a previous diagnosis of depression and anxiety. Reports brief counseling as a child, postpartum depression after birth of second child. PSYCHOSOCIAL HISTORY  CEFERINO is currently living with  and children. She is employed . She reports no previous history of trauma. Support system: , friends  Adventism/Spirituality: reports this is a support to her    DRUG AND ALCOHOL CURRENT USE/HISTORY  TOBACCO:  She reports that she has quit smoking. She has never used smokeless tobacco.  ALCOHOL:  She reports no history of alcohol use. OTHER SUBSTANCES: She reports no history of drug use. MENTAL STATUS EXAM  Affective and emotional state appeared tearful, anxious. Suicidal ideation was denied. Homicidal ideation was denied. Hygiene was good   Dress was appropriate  Behavior was Calm and engaged  Attitude was Cooperative. Eye-contact was good .   Speech is described as normal rate, normal volume and well articulated  Thought processes were logical without evidence of delusions, hallucinations, obsessions, or driss  Pt was oriented to person, place, time, and general circumstances with recent memory:  good and remote memory:  good. Insight is estimated to be good AEB a good  understanding of cyclical maladaptive patterns, and the ability to use insight to inform behavior change. Judgment was estimated to be good    PHQ Scores 7/24/2021 6/10/2021 11/24/2020 9/23/2020 1/27/2020 5/29/2019 9/27/2018   PHQ2 Score 1 2 1 0 0 0 0   PHQ9 Score 1 2 6 0 0 0 0     Interpretation of Total Score Depression Severity: 1-4 = Minimal depression, 5-9 = Mild depression, 10-14 = Moderate depression, 15-19 = Moderately severe depression, 20-27 = Severe depression    How often pt has had thoughts of death or hurting self (if PHQ positive for depression):       DON 7 SCORE 11/24/2020   DON-7 Total Score 1     Interpretation of DON-7 score: 5-9 = mild anxiety, 10-14 = moderate anxiety, 15+ = severe anxiety. Recommend referral to behavioral health for scores 10 or greater. Keyur Zayas presented to the appointment today for evaluation and treatment of symptoms of anxiety and stress. She is currently deemed no risk to herself or others and meets criteria for:  Anxiety Disorder, unspecified, AEB presence of anxiety symptoms causing significant distress without meeting full criteria for a specific anxiety disorder. She will benefit from will benefit from brief and solution-focused consultation to address cognitive and behavioral interventions for stress and anxiety symptoms. Juju Stapleton and/or guardian were in agreement with recommendations. INTERVENTION  orienting pt to process of brief behavioral health services, completing initial assessment of symptoms and needs, provided recommendations, rapport building and encouragement of expression of emotion    DIAGNOSIS  Juju Stapleton was seen today for new patient, anxiety and stress.     Diagnoses and all orders for this visit:    Anxiety disorder, unspecified type        PLAN  Follow up in 2 weeks with [facility]        INTERACTIVE COMPLEXITY  Is interactive complexity present?   No  Reason:  N/A  Additional Supporting Information:  N/A       Electronically signed by Andre Rivera on 8/5/2021 at 1:42 PM

## 2021-08-17 LAB
CHOLESTEROL/HDL RATIO: 4.9
CHOLESTEROL: 236 MG/DL
GLUCOSE BLD-MCNC: 102 MG/DL (ref 70–99)
HDLC SERPL-MCNC: 48 MG/DL
LDL CHOLESTEROL: 154 MG/DL (ref 0–130)
PATIENT FASTING?: YES
TRIGL SERPL-MCNC: 171 MG/DL
VLDLC SERPL CALC-MCNC: ABNORMAL MG/DL (ref 1–30)

## 2021-08-30 ENCOUNTER — HOSPITAL ENCOUNTER (OUTPATIENT)
Age: 46
Setting detail: SPECIMEN
Discharge: HOME | End: 2021-08-30
Payer: COMMERCIAL

## 2021-08-31 ENCOUNTER — OFFICE VISIT (OUTPATIENT)
Dept: BEHAVIORAL/MENTAL HEALTH CLINIC | Age: 46
End: 2021-08-31
Payer: COMMERCIAL

## 2021-08-31 DIAGNOSIS — F41.9 ANXIETY DISORDER, UNSPECIFIED TYPE: Primary | ICD-10-CM

## 2021-08-31 PROCEDURE — 90834 PSYTX W PT 45 MINUTES: CPT | Performed by: SOCIAL WORKER

## 2021-09-03 LAB — DERMATOLOGY PATHOLOGY REPORT: NORMAL

## 2021-09-07 NOTE — PROGRESS NOTES
BEHAVIORAL HEALTH FOLLOW UP  MILAD Brannon  Behavioral Health Consultant      Visit Date: 8/31/2021   Time of appointment:  4pm   Time spent with Patient: 45 minutes. This is patient's second appointment. Pt and/or guardian was educated on the model of service to include a short-term intervention focused approach and as well as the limits of confidentiality (i.e. abuse reporting, suicide intervention, etc.). Pt and/or guardian indicated understanding. Pt and/or guardian provided informed consent for the behavioral health program.  Visit completed in person. Patient presented alone. Patient Location: [facility], [facility]  Provider Location: [facility], [facility]    PCP: JADE Lopes CNP      Reason for Consult: Follow-up    to address pt's Behavioral Health goal: \"reduce pattern of personalizing interactions and behaviors of others and be able to relax and feel more confident at work\"    Aurelia Phillips is a 40 y.o. female who presents for follow up of anxiety and stress. She reports symptoms improving., She reports reduction in stressful interactions at work. Discusses remaining worries about how she is perceived/feedback she may receive from others. States overall doing better and little to no concerns. OBJECTIVE  Affective and emotional state appeared nervous. Suicidal thoughts or behaviors not present  Homicidal thoughts or behaviors not present  Hygiene was good   Dress was appropriate  Behavior was Calm and engaged  Attitude was Cooperative. Eye-contact was good . Speech is described as normal rate, normal volume and well articulated  Thought processes were logical without evidence of delusions, hallucinations, obsessions, or driss  Pt was oriented to person, place, time, and general circumstances with recent memory:  good and remote memory:  good.   Insight and judgment are estimated to be good     PHQ Scores 8/19/2021 7/24/2021 6/10/2021 11/24/2020 9/23/2020 1/27/2020 5/29/2019   PHQ2 Score 1 1 2 1 0 0 0   PHQ9 Score 1 1 2 6 0 0 0     Interpretation of Total Score Depression Severity: 1-4 = Minimal depression, 5-9 = Mild depression, 10-14 = Moderate depression, 15-19 = Moderately severe depression, 20-27 = Severe depression    DON 7 SCORE 11/24/2020   DON-7 Total Score 1     Interpretation of DON-7 score: 5-9 = mile anxiety, 10-14 - moderate anxiety, 15+ = severe anxiety. Recommend referral to behavioral health for scores 10 or greater. Keyur Zayas presented to the appointment today for follow up and treatment of anxiety and stress. She is currently deemed no risk to self or others. The main focus or themes of the visit today included expression and discussion of unhelpful thinking patterns or schemas and reduction in anxiety, worry and panic. Roni Arias shows reduction in symptoms. She would benefit from further behavioral health consultation to address stress and anxiety. Offered further monthly follow up. Pt states overall she is doing better and prefers not to continue follow up at this point but will contact office to schedule if symptoms worsen. Trey Goodwin was in agreement with recommendations. DIAGNOSIS  Roni Arias was seen today for follow-up. Diagnoses and all orders for this visit:    Anxiety disorder, unspecified type        INTERVENTION  Therapeutic strategies included restructuring of problematic automatic thoughts and beliefs, therapeutic feedback regarding development and progress, review of work done by patient between visits and training in communication and interpersonal skills. PLAN  Pt will practice assertive communication where appropriate, challenge or reframe unhelpful worries/self-talk  Return in the future if symptoms worsen. INTERACTIVE COMPLEXITY  Is interactive complexity present?  No  Reason:  N/A  Additional Supporting Information:  N/A       Electronically signed by DAYBREAK OF Venetie Misael Saravia on 9/7/2021 at 2:11 PM

## 2021-09-19 DIAGNOSIS — F41.9 ANXIETY AND DEPRESSION: ICD-10-CM

## 2021-09-19 DIAGNOSIS — F32.A ANXIETY AND DEPRESSION: ICD-10-CM

## 2021-09-20 RX ORDER — VENLAFAXINE HYDROCHLORIDE 150 MG/1
150 CAPSULE, EXTENDED RELEASE ORAL DAILY
Qty: 90 CAPSULE | Refills: 0 | Status: SHIPPED | OUTPATIENT
Start: 2021-09-20 | End: 2021-11-30 | Stop reason: SDUPTHER

## 2021-09-29 DIAGNOSIS — E03.9 ACQUIRED HYPOTHYROIDISM: ICD-10-CM

## 2021-09-30 ENCOUNTER — HOSPITAL ENCOUNTER (OUTPATIENT)
Age: 46
Setting detail: SPECIMEN
Discharge: HOME | End: 2021-09-30
Payer: COMMERCIAL

## 2021-09-30 DIAGNOSIS — F41.9 ANXIETY AND DEPRESSION: ICD-10-CM

## 2021-09-30 DIAGNOSIS — F32.A ANXIETY AND DEPRESSION: ICD-10-CM

## 2021-09-30 DIAGNOSIS — E66.01 MORBID OBESITY WITH BMI OF 40.0-44.9, ADULT (HCC): ICD-10-CM

## 2021-09-30 DIAGNOSIS — E03.9 ACQUIRED HYPOTHYROIDISM: ICD-10-CM

## 2021-09-30 DIAGNOSIS — Z13.220 SCREENING CHOLESTEROL LEVEL: ICD-10-CM

## 2021-09-30 LAB
ABSOLUTE EOS #: 0.26 K/UL (ref 0–0.44)
ABSOLUTE IMMATURE GRANULOCYTE: <0.03 K/UL (ref 0–0.3)
ABSOLUTE LYMPH #: 2.06 K/UL (ref 1.1–3.7)
ABSOLUTE MONO #: 0.57 K/UL (ref 0.1–1.2)
ALBUMIN SERPL-MCNC: 4.3 G/DL (ref 3.5–5.2)
ALBUMIN/GLOBULIN RATIO: 1.6 (ref 1–2.5)
ALP BLD-CCNC: 52 U/L (ref 35–104)
ALT SERPL-CCNC: 36 U/L (ref 5–33)
ANION GAP SERPL CALCULATED.3IONS-SCNC: 14 MMOL/L (ref 9–17)
AST SERPL-CCNC: 27 U/L
BASOPHILS # BLD: 1 % (ref 0–2)
BASOPHILS ABSOLUTE: 0.06 K/UL (ref 0–0.2)
BILIRUB SERPL-MCNC: 0.33 MG/DL (ref 0.3–1.2)
BUN BLDV-MCNC: 16 MG/DL (ref 6–20)
BUN/CREAT BLD: ABNORMAL (ref 9–20)
CALCIUM SERPL-MCNC: 8.6 MG/DL (ref 8.6–10.4)
CHLORIDE BLD-SCNC: 104 MMOL/L (ref 98–107)
CHOLESTEROL, FASTING: 205 MG/DL
CHOLESTEROL/HDL RATIO: 4.3
CO2: 21 MMOL/L (ref 20–31)
CREAT SERPL-MCNC: 0.81 MG/DL (ref 0.5–0.9)
DIFFERENTIAL TYPE: ABNORMAL
EOSINOPHILS RELATIVE PERCENT: 5 % (ref 1–4)
ESTIMATED AVERAGE GLUCOSE: 97 MG/DL
GFR AFRICAN AMERICAN: >60 ML/MIN
GFR NON-AFRICAN AMERICAN: >60 ML/MIN
GFR SERPL CREATININE-BSD FRML MDRD: ABNORMAL ML/MIN/{1.73_M2}
GFR SERPL CREATININE-BSD FRML MDRD: ABNORMAL ML/MIN/{1.73_M2}
GLUCOSE BLD-MCNC: 113 MG/DL (ref 70–99)
HBA1C MFR BLD: 5 % (ref 4–6)
HCT VFR BLD CALC: 44.3 % (ref 36.3–47.1)
HDLC SERPL-MCNC: 48 MG/DL
HEMOGLOBIN: 14.1 G/DL (ref 11.9–15.1)
IMMATURE GRANULOCYTES: 0 %
LDL CHOLESTEROL: 134 MG/DL (ref 0–130)
LYMPHOCYTES # BLD: 38 % (ref 24–43)
MCH RBC QN AUTO: 29.9 PG (ref 25.2–33.5)
MCHC RBC AUTO-ENTMCNC: 31.8 G/DL (ref 28.4–34.8)
MCV RBC AUTO: 94.1 FL (ref 82.6–102.9)
MONOCYTES # BLD: 10 % (ref 3–12)
NRBC AUTOMATED: 0 PER 100 WBC
PDW BLD-RTO: 12.1 % (ref 11.8–14.4)
PLATELET # BLD: 279 K/UL (ref 138–453)
PLATELET ESTIMATE: ABNORMAL
PMV BLD AUTO: 9.9 FL (ref 8.1–13.5)
POTASSIUM SERPL-SCNC: 4.8 MMOL/L (ref 3.7–5.3)
RBC # BLD: 4.71 M/UL (ref 3.95–5.11)
RBC # BLD: ABNORMAL 10*6/UL
SEG NEUTROPHILS: 46 % (ref 36–65)
SEGMENTED NEUTROPHILS ABSOLUTE COUNT: 2.5 K/UL (ref 1.5–8.1)
SODIUM BLD-SCNC: 139 MMOL/L (ref 135–144)
THYROXINE, FREE: 1.17 NG/DL (ref 0.93–1.7)
TOTAL PROTEIN: 7 G/DL (ref 6.4–8.3)
TRIGLYCERIDE, FASTING: 113 MG/DL
TSH SERPL DL<=0.05 MIU/L-ACNC: 0.89 MIU/L (ref 0.3–5)
VLDLC SERPL CALC-MCNC: ABNORMAL MG/DL (ref 1–30)
WBC # BLD: 5.5 K/UL (ref 3.5–11.3)
WBC # BLD: ABNORMAL 10*3/UL

## 2021-09-30 RX ORDER — LEVOTHYROXINE SODIUM 0.05 MG/1
TABLET ORAL
Qty: 90 TABLET | Refills: 2 | Status: SHIPPED | OUTPATIENT
Start: 2021-09-30 | End: 2022-03-09 | Stop reason: SDUPTHER

## 2021-09-30 RX ORDER — LEVOTHYROXINE SODIUM 0.05 MG/1
TABLET ORAL
Qty: 90 TABLET | Refills: 3 | OUTPATIENT
Start: 2021-09-30

## 2021-09-30 NOTE — TELEPHONE ENCOUNTER
Natalie Herrera is calling to request a refill on the following medication(s):    Medication Request:  Requested Prescriptions     Pending Prescriptions Disp Refills    levothyroxine (SYNTHROID) 50 MCG tablet [Pharmacy Med Name: LEVOTHYROXINE SODIUM 50MCG TABS] 90 tablet 1     Sig: TAKE 1 TABLET BY MOUTH ONE TIME A DAY       Last Visit Date (If Applicable):  2/04/4544    Next Visit Date:    Visit date not found

## 2021-09-30 NOTE — TELEPHONE ENCOUNTER
Dory Lakhani is calling to request a refill on the following medication(s):    Medication Request:  Requested Prescriptions     Pending Prescriptions Disp Refills    levothyroxine (SYNTHROID) 50 MCG tablet 90 tablet 3     Sig: TAKE 1 TABLET BY MOUTH ONE TIME A DAY       Last Visit Date (If Applicable):  9/95/9756 In office    Next Visit Date:    Visit date not found

## 2021-11-05 DIAGNOSIS — M25.572 LEFT ANKLE PAIN, UNSPECIFIED CHRONICITY: Primary | ICD-10-CM

## 2021-11-08 ENCOUNTER — OFFICE VISIT (OUTPATIENT)
Dept: ORTHOPEDIC SURGERY | Age: 46
End: 2021-11-08
Payer: COMMERCIAL

## 2021-11-08 VITALS — RESPIRATION RATE: 12 BRPM | HEIGHT: 60 IN | WEIGHT: 208 LBS | BODY MASS INDEX: 40.84 KG/M2

## 2021-11-08 DIAGNOSIS — Q74.2 PAIN ASSOCIATED WITH ACCESSORY NAVICULAR BONE OF LEFT FOOT: ICD-10-CM

## 2021-11-08 DIAGNOSIS — M76.822 POSTERIOR TIBIAL TENDINITIS OF LEFT LOWER EXTREMITY: ICD-10-CM

## 2021-11-08 DIAGNOSIS — M72.2 PLANTAR FASCIITIS: Primary | ICD-10-CM

## 2021-11-08 DIAGNOSIS — M21.861 GASTROCNEMIUS EQUINUS OF RIGHT LOWER EXTREMITY: ICD-10-CM

## 2021-11-08 DIAGNOSIS — M25.572 LEFT ANKLE PAIN, UNSPECIFIED CHRONICITY: Primary | ICD-10-CM

## 2021-11-08 DIAGNOSIS — M19.079 ARTHRITIS OF MIDFOOT: ICD-10-CM

## 2021-11-08 DIAGNOSIS — M79.672 PAIN ASSOCIATED WITH ACCESSORY NAVICULAR BONE OF LEFT FOOT: ICD-10-CM

## 2021-11-08 PROCEDURE — 99204 OFFICE O/P NEW MOD 45 MIN: CPT | Performed by: ORTHOPAEDIC SURGERY

## 2021-11-08 RX ORDER — NAPROXEN 500 MG/1
500 TABLET ORAL 2 TIMES DAILY PRN
Qty: 60 TABLET | Refills: 0 | Status: SHIPPED | OUTPATIENT
Start: 2021-11-08

## 2021-11-08 NOTE — LETTER
[facility]  [address] [address] [place] [address]  [address] [address]  Phone: [phone]  Fax: [phone]    Vivke Norton MD    November 8, 2021     Murray Mcneill, APRN - CNP  [address] [place] [address]    Patient: Nallely Nobles   MR Number: [id number]   YOB: 1975   Date of Visit: 11/8/2021       Dear Murray Mcneill: Thank you for referring Aleksander Redmond to me for evaluation/treatment. Below are the relevant portions of my assessment and plan of care. She has left greater than right foot pain, secondary to multiple issues. 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 On the right, she has plantar fasciitis and a gastroc equinus contracture (was previously diagnosed in March 2021 with a right calcaneal stress fracture). Notably, she has no relevant past medical history. We had a discussion today about the likely diagnosis and its natural history, physical exam and imaging findings, as well as various treatment options in detail. Surgically, we discussed that I do not recommend surgery at this time, and did recommend conservative management. In the future, she may potentially benefit from a right gastroc recession, and/or left modified Kidner type procedure versus midfoot fusion with bone grafting and gastroc recession. Orders/referrals were placed as below at today's visit. On the right, the patient was provided a night splint, and she will use this every night for 6 weeks. I referred the patient to physical therapy for gastroc and plantar fascia stretching. The patient was provided heel cups, to use while ambulatory for pain control.      On the left, the patient was provided information on an over the counter flatfoot style orthotic, including how to obtain it. I referred the patient to physical therapy for my flatfoot protocol. At today's visit, she was ordered oral NSAIDs as below to be used as needed, and we discussed the appropriate risks. The patient was provided teaching material on this today, and will avoid using multiple NSAIDs at the same time. All questions were answered and the above plan was agreed upon. The patient will return to clinic in 6 weeks without x-rays. At her next visit, depending how she is doing, we may consider a left foot MRI as well as a possible right plantar fascia injection. If you have questions, please do not hesitate to call me. I look forward to following Sophia Macias along with you.     Sincerely,      Arleen Prescott MD

## 2021-11-08 NOTE — PROGRESS NOTES
[facility]  [address] [facility]  [address] [address]  Dept: [phone]    Ambulatory Orthopedic Consult      CHIEF COMPLAINT:    Chief Complaint   Patient presents with    Ankle Pain     bilateral        HISTORY OF PRESENT ILLNESS:      The patient is a 40 y.o. female who is being seen for evaluation of the above, which began in the summer 2021 atraumatically  . At today's visit, she is using no brace/assistive device. History is obtained today from:   [x]  the patient     [x]  EMR     []  one family member/friend    []  multiple family members/friends    []  other: On the right, she localizes her pain to her plantar medial heel, and reports pain with her first steps in the morning. She reports that she is previously seen Dr. Patrick Ocampo for this, and was diagnosed with a stress fracture. On the left, she localizes her pain to her dorsal/medial midfoot and medial hindfoot, and reports some pain that radiates into her medial/plantar forefoot. REVIEW OF SYSTEMS:  Musculoskeletal: See HPI for pertinent positives     Past Medical History:    She  has a past medical history of Abnormal Pap smear (2005), Anxiety, and GDM (gestational diabetes mellitus) (2007). Past Surgical History:    She  has a past surgical history that includes intrauterine device insertion (05/2013); Colposcopy; Cervix biopsy; LEEP (2005); Intrauterine Device Removal (07/10/2018); and intrauterine device insertion (07/10/2018).      Current Medications:     Current Outpatient Medications:     naproxen (EC NAPROSYN) 500 MG EC tablet, Take 1 tablet by mouth 2 times daily as needed for Pain, Disp: 60 tablet, Rfl: 0    levothyroxine (SYNTHROID) 50 MCG tablet, TAKE 1 TABLET BY MOUTH ONE TIME A DAY, Disp: 90 tablet, Rfl: 2    venlafaxine (EFFEXOR XR) 150 MG extended release capsule, Take 1 capsule by mouth daily, Disp: 90 capsule, Rfl: 0     Allergies: Patient has no known allergies. Family History:  family history includes Cancer in her father and maternal grandfather; Diabetes in her father; Heart Disease in her maternal aunt, maternal grandfather, and maternal grandmother; High Cholesterol in her mother; Other in her father and paternal grandfather. Social History:   Social History     Occupational History    Not on file   Tobacco Use    Smoking status: Former Smoker    Smokeless tobacco: Never Used   Vaping Use    Vaping Use: Never used   Substance and Sexual Activity    Alcohol use: No    Drug use: No    Sexual activity: Yes     Birth control/protection: I.U.D. 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 Skin: No erythema/ulcers. Intact. Neurovascular Status:  Grossly neurovascularly intact throughout   Tenderness to Palpation: Plantar heel  -Gastroc equinus  -Negative squeeze test of the calcaneus      LLE:  Vascular: Limb well perfused, compartments soft/compressible. Skin: No erythema/ulcers. Intact. Neurovascular Status:  Grossly neurovascularly intact throughout   Tenderness to Palpation: Medial hindfoot/midfoot (distal course the posterior tibial tendon at the accessory navicular)  -No significant pain with resisted inversion      RADIOLOGY:   11/8/2021 FINDINGS:  Three views (AP, Mortise, and Lateral) of the bilateral ankle and three views (AP, Oblique, Lateral) of the bilateral foot were obtained in the office today and reviewed, revealing no acute fracture, dislocation, or radiopaque foreign body/tumor.   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 IMPRESSION:  No acute fracture/dislocation. Degenerative changes as above, and accessory navicular. Electronically signed by Vivek Norton MD      Relevant previous imaging reviewed, both imaging and report(s) as below:    No results found. ASSESSMENT AND PLAN:  Body mass index is 40.62 kg/m². She has left greater than right foot pain, secondary to multiple issues. 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 On the right, she has plantar fasciitis and a gastroc equinus contracture (was previously diagnosed in March 2021 with a right calcaneal stress fracture). Notably, she has no relevant past medical history. We had a discussion today about the likely diagnosis and its natural history, physical exam and imaging findings, as well as various treatment options in detail. Surgically, we discussed that I do not recommend surgery at this time, and did recommend conservative management. In the future, she may potentially benefit from a right gastroc recession, and/or left modified Kidner type procedure versus midfoot fusion with bone grafting and gastroc recession. Orders/referrals were placed as below at today's visit. On the right, the patient was provided a night splint, and she will use this every night for 6 weeks. I referred the patient to physical therapy for gastroc and plantar fascia stretching. The patient was provided heel cups, to use while ambulatory for pain control. On the left, the patient was provided information on an over the counter flatfoot style orthotic, including how to obtain it. I referred the patient to physical therapy for my flatfoot protocol.  At today's visit, she was ordered oral NSAIDs as below to be used as needed, and we discussed the appropriate risks. The patient was provided teaching material on this today, and will avoid using multiple NSAIDs at the same time. All questions were answered and the above plan was agreed upon. The patient will return to clinic in 6 weeks without x-rays. At her next visit, depending how she is doing, we may consider a left foot MRI as well as a possible right plantar fascia injection. At the patient's next visit, depending on how the patient is doing and/or new imaging/labs results, we may consider the following options:    []  Lace up ankle     []  CAM boot         []  removable wrist brace     []  PT:        []  Wean out immobilization         []  Adv activity      []  Footmind        []  Spenco       []  Custom Orthotic:               []  AZ brace                    []  Rocker Bottom      []  Night splint    []  Heel cups        []  Strap        []  Toe gizmos    []  Topl        []  NSAIDs         []  Broderick        []  Ref:         []  Stress Xray    []  CT        []  MRI  []  Inj:          []  Consider OR      []  Pick OR date    No follow-ups on file. No orders of the defined types were placed in this encounter. Orders Placed This Encounter   Procedures    XR FOOT RIGHT (MIN 3 VIEWS)     Standing Status:   Future     Number of Occurrences:   1     Standing Expiration Date:   11/8/2022    XR ANKLE RIGHT (MIN 3 VIEWS)     Standing Status:   Future     Number of Occurrences:   1     Standing Expiration Date:   11/8/2022         Clare Hall MD  Orthopedic Surgery        Please excuse any typos/errors, as this note was created with the assistance of voice recognition software. While intending to generate a document that actually reflects the content of the visit, the document can still have some errors including those of syntax and sound-a-like substitutions which may escape proof reading.  In such instances, actual meaning can be extrapolated by context.

## 2021-11-30 DIAGNOSIS — F32.A ANXIETY AND DEPRESSION: ICD-10-CM

## 2021-11-30 DIAGNOSIS — F41.9 ANXIETY AND DEPRESSION: ICD-10-CM

## 2021-12-01 RX ORDER — VENLAFAXINE HYDROCHLORIDE 150 MG/1
150 CAPSULE, EXTENDED RELEASE ORAL DAILY
Qty: 90 CAPSULE | Refills: 0 | Status: SHIPPED | OUTPATIENT
Start: 2021-12-01 | End: 2021-12-02

## 2021-12-01 NOTE — TELEPHONE ENCOUNTER
Livan Espitia is calling to request a refill on the following medication(s):    Medication Request:  Requested Prescriptions     Pending Prescriptions Disp Refills    venlafaxine (EFFEXOR XR) 150 MG extended release capsule 90 capsule 1     Sig: Take 1 capsule by mouth daily       Last Visit Date (If Applicable):  9/93/7503    Next Visit Date:    Visit date not found

## 2021-12-02 DIAGNOSIS — F41.9 ANXIETY AND DEPRESSION: ICD-10-CM

## 2021-12-02 DIAGNOSIS — F32.A ANXIETY AND DEPRESSION: ICD-10-CM

## 2021-12-02 RX ORDER — VENLAFAXINE HYDROCHLORIDE 150 MG/1
CAPSULE, EXTENDED RELEASE ORAL
Qty: 90 CAPSULE | Refills: 0 | Status: SHIPPED | OUTPATIENT
Start: 2021-12-02 | End: 2022-03-09 | Stop reason: SDUPTHER

## 2021-12-02 NOTE — TELEPHONE ENCOUNTER
Graeme Rucker is calling to request a refill on the following medication(s):    Medication Request:  Requested Prescriptions     Pending Prescriptions Disp Refills    venlafaxine (EFFEXOR XR) 150 MG extended release capsule [Pharmacy Med Name: VENLAFAXINE HCL ER 150MG CP24] 90 capsule 0     Sig: TAKE 1 CAPSULE BY MOUTH ONE TIME A DAY       Last Visit Date (If Applicable):  8/95/7645 In office    Next Visit Date:    Visit date not found

## 2021-12-20 ENCOUNTER — HOSPITAL ENCOUNTER (OUTPATIENT)
Dept: PHYSICAL THERAPY | Facility: CLINIC | Age: 46
Setting detail: THERAPIES SERIES
Discharge: HOME | End: 2021-12-20
Payer: COMMERCIAL

## 2021-12-20 NOTE — FLOWSHEET NOTE
[] [address]  [address]    [phone]  F: [phone]   [] [address]  [facility]   [address]  P: [phone]  F: [phone]  [] [place]  [address] [facility]  P: [phone]  F: [phone] [] [address]  P: [phone]  F: [phone]  [] [address]  [address] [facility] [address]   [address]   [place]: [phone]  F: [phone]   [] [address]  [place]: [phone]   F: [phone]     Physical Therapy Cancel/No Show note    Date: 2021  Patient: Sharath Lyons  : 1975  MRN: [id number]    Cancels/No Shows to date:     For today's appointment patient:    [x]  Cancelled    [] Rescheduled appointment    [] No-show     Reason given by patient:    []  Patient ill    []  Conflicting appointment    [] No transportation      [] Conflict with work    [] No reason given    [] Weather related    [] COVID-19    [x] Other:      Comments: Pt left VM stating needing to cancel evaluation, requesting call back to reschedule. Left VM to reschedule.          [] Next appointment was confirmed    Electronically signed by: Lamin Morataya, PT

## 2021-12-21 ENCOUNTER — HOSPITAL ENCOUNTER (OUTPATIENT)
Dept: PHYSICAL THERAPY | Facility: CLINIC | Age: 46
Setting detail: THERAPIES SERIES
Discharge: HOME | End: 2021-12-21
Payer: COMMERCIAL

## 2021-12-21 PROCEDURE — 97140 MANUAL THERAPY 1/> REGIONS: CPT

## 2021-12-21 PROCEDURE — 97161 PT EVAL LOW COMPLEX 20 MIN: CPT

## 2021-12-21 NOTE — CONSULTS
[] [facility]) [facility] &  Therapy  [address]  [phone]  F: [phone] [] [address]  [place] [address]   [address]  P: [phone]  F: [phone] [] [address]  Therapy  [address]  P: [phone]  F: [phone] [] [address]  P: [phone]  F: [phone] [] [address]  [facility]   [address]   [place]: [phone]  F: [phone]      Physical Therapy Lower Extremity Evaluation    Date:  2021  Patient: Livan Espitia  : 1975  MRN: [id number]  Physician: Dr. Oscar Rowley: [address] ([place], no copay, ded 1000/510 remain, 10%)  Medical Diagnosis: L ankle pain Rehab Codes: M25.672, M25.572  Onset date: 21   Next Dr's appt. :     Subjective:   CC: L ankle pain  HPI: hurts when she plays tennis and then the pain resolves. Then drives home, hurts worse when she gets out of the car. KT helps. Eliazar is along dorsal first ray. Hurts to come up on her toes, hurts with great toe ext. Swelling on ant tib attachment. She purchased inserts and has a + effect on the R/ no effect on the L.       PMHx: [x] Unremarkable [] Diabetes [] HTN  [] Pacemaker   [] MI/Heart Problems [] Cancer [] Arthritis [] Other:              [x] Refer to full medical chart  In EPIC       Comorbidities:   [] Obesity [] Dialysis  [] N/A   [] Asthma/COPD [] Dementia [] Other:   [] Stroke [] Sleep apnea [] Other:   [] Vascular disease [] Rheumatic disease [] Other:     Tests: [x] X-Ray:  Narrative   2021 FINDINGS:  Three views (AP, Mortise, and Lateral) of the    bilateral ankle and three views (AP, Oblique, Lateral) of the bilateral    foot were obtained in the office today and reviewed, revealing no acute    fracture, dislocation, or radiopaque foreign body/tumor.  On the left,    there is degenerative change at the midfoot, particularly the    naviculocuneiform joint, with joint space narrowing, sclerosis, and    osteophytes; accessory navicular is also present.           Impression     No acute fracture/dislocation.  Degenerative changes as    above, and accessory navicular.       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   [] Light duty   [] Off due to condition    []  Retired   [] Not employed   [] Disability  [] Other:  []  Return to work: Work activities/duties Tennis level 3.0           Pain:  [x] Yes  [] No Location: L ankle Pain Rating: (0-10 scale) 3/10  Pain altered Tx:  [] Yes  [x] No  Action:    Symptoms:  [] Improving [] Worsening [x] Same  Better:  [] AM    [] PM    [] Sit    [] Rise/Sit    []Stand    [] Walk    [] Lying    [] Other:  Worse: [] AM    [] PM    [] Sit    [] Rise/Sit    []Stand    [] Walk    [] Lying    [] Bend                      [] Valsalva    [x] Other: playing tennis  Sleep: [x] OK    [] Disturbed    Objective:    ROM  ° A/P STRENGTH TESTS (+/-) Left Right Not Tested    Left Right Left Right Ant.  Drawer   []   Hip Flex     Post. Drawer   []   Ext     Lachmans   []   ER     Valgus Stress   []   IR     Varus Stress   []   ABD     Jads   []   ADD     Apleys Comp.   []   Knee Flex     Apleys Dist.   []   Ext     Hip Scouring   []   Ankle DF stiff  5  SANTIAGOs   []   PF wnl  5  Piriformis   []   INV wnl  5  Herminios   []   EVER wnl  5  Talor Tilt   []   GTE wnl    Pat-Fem Grind   []       OBSERVATION No Deficit Deficit Not Tested Comments   Posture       Forward Head [] [] []    Rounded Shoulders [] [] []    Kyphosis [] [] []    Lordosis [] [] []    Lateral Shift [] [] [] Scoliosis [] [] []    Iliac Crest [] [] []    PSIS [] [] []    ASIS [] [] []    Genu Valgus [] [] []    Genu Varus [] [] []    Genu Recurvatum [] [] []    Pronation [] [] []    Supination [] [] []    Leg Length Discrp [] [] []    Slumped Sitting [] [] []    Palpation [] [x] [] Medial gastroc, post tib, FHL, FDL   Sensation [] [] []    Edema [x] [] []    Neurological [] [] []    Patellar Mobility [] [] []    Patellar Orientation [] [] []    Gait [] [] [] Analysis:         Functional Test: lefi Score: 16% functionally impaired     Comments:    Assessment:  Patient would benefit from skilled physical therapy services in order to: decrease muscle spasm of gastroc and medial calf, improve functional mobility in all planes of movement on a tennis court. Problems:    [x] ? Pain:  [x] ? ROM:  [x] ? Strength:  [x] ? Function:  [] Other:       STG: (to be met in 6 treatments)  1. ? Pain: <3/10 at the worst  2. ? ROM: with DF and no pain  3. ? Strength:  4. ? Function: LEFI >73/80  5. Patient to be independent with home exercise program as demonstrated by performance with correct form without cues. 6. Able to play tennis at 75% without increase in pain  LTG: (to be met in 12 treatments)  1. No pain in either foot. 2. Able to play tennis with no issues during or after. 3. No increase in pain when doing calf raises  4. No pain with GTE                    Patient goals:\"help pain to go away\"    Rehab Potential:  [x] Good  [] Fair  [] Poor   Suggested Professional Referral:  [x] No  [] Yes:  Barriers to Goal Achievement:  [x] No  [] Yes:  Domestic Concerns:  [x] No  [] Yes:    Pt. Education:  [x] Plans/Goals, Risks/Benefits discussed  [x] Home exercise program self STM to calf   Method of Education: [x] Verbal  [x] Demo  [] Written  Comprehension of Education:  [] Verbalizes understanding. [] Demonstrates understanding. [x] Needs Review. [] Demonstrates/verbalizes understanding of HEP/Ed previously given.     Treatment Plan:  [x] Therapeutic Exercise   09622  [] Iontophoresis: 4 mg/mL Dexamethasone Sodium Phosphate  mAmin  89177   [] Therapeutic Activity  58379 [x] Vasopneumatic cold with compression  90638    [] Gait Training   27036 [] Ultrasound   39260   [x] Neuromuscular Re-education  76224 [] Electrical Stimulation Unattended  81482   [x] Manual Therapy  54990 [] Electrical Stimulation Attended  63605   [x] Instruction in HEP  [] Lumbar/Cervical Traction  70407   [] Aquatic Therapy   34701 [] Cold/hotpack    [] Massage   41973      [] Dry Needling, 1 or 2 muscles  11380   [] Biofeedback, first 15 minutes   74768  [] Biofeedback, additional 15 minutes   00411 [] Dry Needling, 3 or more muscles  39288       Frequency: 2 x/week for 12 visits        Todays Treatment:  Modalities:   Treatment Location  Left      Right                          Position   L foot [x]          []  [x] Supine    [] Prone   [] Side lying  [] Sitting          Treatment Modality   3 Vasocompression    34° temp    Med pressure     15min   1,2 Other:  Ex, manual       Precautions: standard  Exercises:  Exercise Reps/ Time Weight/ Level Issued to HEP Completed Comments   Mat based        1 legged bridge *       Zehra hip abd *       Clamshells *       Kylah calf stretch 3x30\"  x x            Gym        BAPS *       Burrito stretch *       Lateral step down *       Monster walk *       MOBO *                               Other:  Manual  1. STM in prone and SL to L calf/medial calf--do not use hypervolt    Specific Instructions for next treatment:  1. Continue manual  2. Add stability ex as above  3.   Finish with Game Ready       Evaluation Complexity:  History (Personal factors, comorbidities) [x] 0 [] 1-2 [] 3+   Exam (limitations, restrictions) [x] 1-2 [] 3 [] 4+   Clinical presentation (progression) [x] Stable [] Evolving  [] Unstable   Decision Making [x] Low [] Moderate [] High    [x] Low Complexity [] Moderate Complexity [] High Complexity Treatment Charges: Mins Units   [x] Evaluation       [x]  Low       []  Moderate       []  High  1   []  Modalities     []  Ther Exercise     [x]  Manual Therapy 20 1   []  Ther Activities     []  Aquatics     []  Vasocompression     []  Other       TOTAL TREATMENT TIME: 50    Time in: 0295   Time Out:1110    Electronically signed by: Zhane Fairbanks PT        Physician Signature:________________________________Date:__________________  By signing above or cosigning this note, I have reviewed this plan of care and certify a need for medically necessary rehabilitation services.      *PLEASE SIGN ABOVE AND FAX BACK ALL PAGES*

## 2021-12-23 ENCOUNTER — HOSPITAL ENCOUNTER (OUTPATIENT)
Dept: PHYSICAL THERAPY | Facility: CLINIC | Age: 46
Setting detail: THERAPIES SERIES
Discharge: HOME | End: 2021-12-23
Payer: COMMERCIAL

## 2021-12-23 PROCEDURE — 97140 MANUAL THERAPY 1/> REGIONS: CPT

## 2021-12-23 PROCEDURE — 97110 THERAPEUTIC EXERCISES: CPT

## 2021-12-23 NOTE — FLOWSHEET NOTE
[] [facility]) [facility] [place]  [facility]  [address]  [phone]  F: [phone] [] [address]  [employer] [address]   [address]  P: [phone]  F: [phone] [] [address]  Therapy  [address] [facility] [address] [facility] [address]  P: [phone]  F: [phone] [x] [address]  P: [phone]  F: [phone] [] [address]  [facility]   [address]   [place]: [phone]  F: [phone]      Physical Therapy Daily Treatment Note    Date:  2021  Patient Name:  Brando Wells    :  1975  MRN: [id number]  Physician: Dr. Thornton Seed: [address] ([address], no copay, ded 1000/510 remain, 10%)  Medical Diagnosis: L ankle pain      Rehab Codes: M25.672, M25.572  Onset date: 21                           Next Dr's appt. :    Visit# / total visits:       Cancels/No Shows: 0/0    Subjective:    Pain:  [x] Yes  [] No Location: L ankle Pain Rating: (0-10 scale) 4/10  Pain altered Tx:  [x] No  [] Yes  Action:  Comments: Pt reported that she is feeling not bad and that she is having some pain in her ankle mostly on top.      Objective:  Modalities:   Precautions:  Exercises:  Exercise Reps/ Time Weight/ Level Issued to HEP Completed Comments   Mat based             1 legged bridge 2x10      x     Zehra hip abd 2x10      x     Clamshells 2x10 blue    x     Kylah calf stretch 3x30\"   x x     Toe yoga 2x10   x    4 way ankle 10x ea blue  x                          Gym             BAPS 2x10      x Fwd/post, CW/CCW   Burrito stretch 2x30\"      x     Lateral step down 2x10      x     Monster walk 2 L blue    x Fwd, lateral   MOBO 2x10      x  1/3,2/4    Recumbent bike 5 min Level 1    x                                 Other:      Treatment Charges: Mins Units   []  Modalities     [x]  Ther Exercise 45 3   [x]  Manual Therapy 10 1   []  Ther Activities     []  Aquatics     []  Vasocompression     []  Other     Total Treatment time 55 4       Assessment: [x] Progressing toward goals. Added all activities as indicated above to improve LE strength and stability. Pt received manual as listed to decrease muscular adhesions with pt noting decrease in pain level post. Pt noted minor increase in pain during 4 way ankle no higher than 4/10. [] No change. [] Other:  [x] Patient would continue to benefit from skilled physical therapy services in order to: decrease muscle spasm of gastroc and medial calf, improve functional mobility in all planes of movement on a tennis court. STG/LTG     STG: (to be met in 6 treatments)  1. ? Pain: <3/10 at the worst  2. ? ROM: with DF and no pain  3. ? Strength:  4. ? Function: LEFI >73/80  5. Patient to be independent with home exercise program as demonstrated by performance with correct form without cues. 6. Able to play tennis at 75% without increase in pain  LTG: (to be met in 12 treatments)  1. No pain in either foot. 2. Able to play tennis with no issues during or after. 3. No increase in pain when doing calf raises  4. No pain with GTE                     Patient goals:\"help pain to go away\"    Pt. Education:  [x] Yes  [] No  [] Reviewed Prior HEP/Ed  Method of Education: [x] Verbal  [x] Demo  [] Written  Comprehension of Education:  [x] Verbalizes understanding. [x] Demonstrates understanding. [] Needs review. [] Demonstrates/verbalizes HEP/Ed previously given. Plan: [x] Continue current frequency toward long and short term goals.     [x] Specific Instructions for subsequent treatments: prepare HEP      Time In:1300            Time Out: 1401    Electronically signed by:  Gareth Mathew PTA

## 2021-12-28 ENCOUNTER — HOSPITAL ENCOUNTER (OUTPATIENT)
Dept: PHYSICAL THERAPY | Facility: CLINIC | Age: 46
Setting detail: THERAPIES SERIES
Discharge: HOME | End: 2021-12-28
Payer: COMMERCIAL

## 2021-12-28 ENCOUNTER — OFFICE VISIT (OUTPATIENT)
Dept: PRIMARY CARE CLINIC | Age: 46
End: 2021-12-28
Payer: COMMERCIAL

## 2021-12-28 VITALS
DIASTOLIC BLOOD PRESSURE: 82 MMHG | HEIGHT: 60 IN | BODY MASS INDEX: 40.25 KG/M2 | TEMPERATURE: 97 F | HEART RATE: 94 BPM | SYSTOLIC BLOOD PRESSURE: 122 MMHG | WEIGHT: 205 LBS | OXYGEN SATURATION: 98 %

## 2021-12-28 DIAGNOSIS — J06.9 ACUTE URI: Primary | ICD-10-CM

## 2021-12-28 LAB — S PYO AG THROAT QL: NORMAL

## 2021-12-28 PROCEDURE — 99213 OFFICE O/P EST LOW 20 MIN: CPT | Performed by: NURSE PRACTITIONER

## 2021-12-28 PROCEDURE — 97110 THERAPEUTIC EXERCISES: CPT

## 2021-12-28 PROCEDURE — 97140 MANUAL THERAPY 1/> REGIONS: CPT

## 2021-12-28 PROCEDURE — 87880 STREP A ASSAY W/OPTIC: CPT | Performed by: NURSE PRACTITIONER

## 2021-12-28 RX ORDER — BENZONATATE 200 MG/1
200 CAPSULE ORAL 3 TIMES DAILY PRN
Qty: 30 CAPSULE | Refills: 0 | Status: SHIPPED | OUTPATIENT
Start: 2021-12-28 | End: 2022-01-04

## 2021-12-28 ASSESSMENT — ENCOUNTER SYMPTOMS
ABDOMINAL PAIN: 0
VOMITING: 0
SORE THROAT: 1
RHINORRHEA: 1
SHORTNESS OF BREATH: 0
NAUSEA: 0
COUGH: 1
SINUS PAIN: 0

## 2021-12-28 NOTE — FLOWSHEET NOTE
[] [facility]) - [facility]  [facility]  [address]  [phone]  F: [phone] [] [address]  [facility] [address]   [address]  P: [phone]  F: [phone] [] [address] &  Therapy  [address]  P: [phone]  F: [phone] [x] [email or web address]  P: [phone]  F: [phone] [] [address]  [facility]   [address]   [place]: [phone]  F: [phone]      Physical Therapy Daily Treatment Note    Date:  2021  Patient Name:  Livan Espitia    :  1975  MRN: [id number]  Physician: Dr. Ish Dee: [address] ([address], no copay, ded 1000/510 remain, 10%)  Medical Diagnosis: L ankle pain      Rehab Codes: M25.672, M25.572  Onset date: 21                           Next Dr's appt. :    Visit# / total visits: 2      Cancels/No Shows: 0/0    Subjective:    Pain:  [x] Yes  [] No Location: L ankle Pain Rating: (0-10 scale) 4/10  Pain altered Tx:  [x] No  [] Yes  Action:  Comments: Pt reported that she played tennis and the pain was less severe afterwards    Objective:  Modalities: none  Precautions: standard  Exercises:  Exercise Reps/ Time Weight/ Level Issued to HEP Completed Comments   Mat based             1 legged bridge 2x10      x     Zehra hip abd 2x10      x     Clamshells 2x10 blue    x     Kylah calf stretch 3x30\"   x x     Toe yoga 2x10   x    4 way ankle 10x ea blue  x                          Gym             BAPS 3x10 L2    X  CW/CCW   Burrito stretch 3x30\"     X     Lateral step down 2x10 6\"   X     Monster walk 4x20' blue X X Band on feet   MOBO 2x10  2/4   X Functional reach ant-lat-post     Lunge walks                           Other:  Manual  1.   STM in prone and supine to L calf    Treatment Charges: Mins Units   []  Modalities     [x]  Ther Exercise 35 2   [x]  Manual Therapy 10 1   []  Ther Activities     []  Aquatics     []  Vasocompression     []  Other     Total Treatment time 45 3       Assessment: [x] Progressing toward goals. Progressed her HEP and issued via [address] [place] [address]. She was very tender on calf. Decreased pain with lunge position with L great toe going into ext    [] No change. [] Other:  [x] Patient would continue to benefit from skilled physical therapy services in order to: decrease muscle spasm of gastroc and medial calf, improve functional mobility in all planes of movement on a tennis court. STG/LTG     STG: (to be met in 6 treatments)  1. ? Pain: <3/10 at the worst  2. ? ROM: with DF and no pain  3. ? Strength:  4. ? Function: LEFI >73/80  5. Patient to be independent with home exercise program as demonstrated by performance with correct form without cues. 6. Able to play tennis at 75% without increase in pain  LTG: (to be met in 12 treatments)  1. No pain in either foot. 2. Able to play tennis with no issues during or after. 3. No increase in pain when doing calf raises  4. No pain with GTE                     Patient goals:\"help pain to go away\"    Pt. Education:  [x] Yes  [] No  [] Reviewed Prior HEP/Ed  Method of Education: [x] Verbal  [x] Demo  [x] Written  Access Code: [email or web address]  URL: [email or web address]  Date: 12/28/2021  Prepared by: Thomas Leaf    Exercises  Single Leg Bridge - 1-2 x daily - 7 x weekly - 2 sets - 10 reps  Side Stepping with Resistance at Feet - 2 x daily - 7 x weekly - 4 sets - 10-15 reps  Lateral Step Down - 1-2 x daily - 7 x weekly - 2 sets - 10 reps  Calf stretch - 2 x daily - 7 x weekly - 1 sets - 3 reps - 30 sec hold  calf stretch w/ towel under big toe - 2 x daily - 7 x weekly - 1 sets - 3 reps - 30 sec hold  Sidelying Hip Abduction - 2 x daily - 7 x weekly - 2 sets - 10 reps  Clamshell with Resistance - 2 x daily - 7 x weekly - 2 sets - 10-20 reps      Comprehension of Education:  [x] Verbalizes understanding. [x] Demonstrates understanding. [] Needs review. [] Demonstrates/verbalizes HEP/Ed previously given. Plan: [x] Continue current frequency toward long and short term goals.     [x] Specific Instructions for subsequent treatments:       Time In:1400            Time Out: 8650    Electronically signed by:  Garrett Handy PT

## 2021-12-28 NOTE — PATIENT INSTRUCTIONS

## 2021-12-28 NOTE — PROGRESS NOTES
[facility] [address] [facility] WALK IN [facility]  [address] [address]  Dept: [phone]  Dept Fax: [phone]    Rock Parker is a 40 y.o. female who presents today for her medicalconditions/complaints as noted below. Rock Parker is c/o of Sinusitis (sinus congestion, chest congestion, cough, ear fullnes x 2 days )      HPI:         78-year-old female patient presents with complaints of body aches fatigue congestion cough. Reports symptoms for the past 2 days. Reports generalized fatigue body aches some nasal congestion bilateral ear fullness and coughing up some mucus. Denies chest pain shortness breath or denies vomiting diarrhea. Treatments tried include over-the-counter regimen. Exposure to daughter who had strep. Reports her  has sinus infection. Past Medical History:   Diagnosis Date    Abnormal Pap smear 2005    Anxiety     GDM (gestational diabetes mellitus) 2007        Current Outpatient Medications   Medication Sig Dispense Refill    benzonatate (TESSALON) 200 MG capsule Take 1 capsule by mouth 3 times daily as needed for Cough 30 capsule 0    venlafaxine (EFFEXOR XR) 150 MG extended release capsule TAKE 1 CAPSULE BY MOUTH ONE TIME A DAY 90 capsule 0    levothyroxine (SYNTHROID) 50 MCG tablet TAKE 1 TABLET BY MOUTH ONE TIME A DAY 90 tablet 2    naproxen (EC NAPROSYN) 500 MG EC tablet Take 1 tablet by mouth 2 times daily as needed for Pain 60 tablet 0     No current facility-administered medications for this visit. No Known Allergies    Subjective:      Review of Systems   Constitutional: Positive for fatigue. Negative for chills and fever. HENT: Positive for congestion, ear pain, rhinorrhea and sore throat. Negative for postnasal drip and sinus pain. Respiratory: Positive for cough. Negative for shortness of breath. Cardiovascular: Negative for chest pain and palpitations.    Gastrointestinal: Negative for abdominal pain, nausea and vomiting. Neurological: Negative for dizziness and headaches. All other systems reviewed and are negative.      :Objective     Physical Exam  Vitals and nursing note reviewed. Constitutional:       General: She is not in acute distress. Appearance: Normal appearance. She is not toxic-appearing. HENT:      Right Ear: Tympanic membrane normal.      Left Ear: Tympanic membrane normal.      Nose: Congestion present. Mouth/Throat:      Pharynx: Posterior oropharyngeal erythema present. Cardiovascular:      Rate and Rhythm: Normal rate. Pulmonary:      Effort: Pulmonary effort is normal.      Breath sounds: Normal breath sounds. Skin:     General: Skin is warm and dry. Neurological:      General: No focal deficit present. Mental Status: She is alert and oriented to person, place, and time.        /82   Pulse 94   Temp 97 °F (36.1 °C) (Infrared)   Ht 5' (1.524 m)   Wt 205 lb (93 kg)   SpO2 98%   BMI 40.04 kg/m²     Lab Review   Hospital Outpatient Visit on 09/30/2021   Component Date Value    Hemoglobin A1C 09/30/2021 5.0     Estimated Avg Glucose 09/30/2021 97     WBC 09/30/2021 5.5     RBC 09/30/2021 4.71     Hemoglobin 09/30/2021 14.1     Hematocrit 09/30/2021 44.3     MCV 09/30/2021 94.1     MCH 09/30/2021 29.9     MCHC 09/30/2021 31.8     RDW 09/30/2021 12.1     Platelets 52/89/8266 279     MPV 09/30/2021 9.9     NRBC Automated 09/30/2021 0.0     Differential Type 09/30/2021 NOT REPORTED     Seg Neutrophils 09/30/2021 46     Lymphocytes 09/30/2021 38     Monocytes 09/30/2021 10     Eosinophils % 09/30/2021 5*    Basophils 09/30/2021 1     Immature Granulocytes 09/30/2021 0     Segs Absolute 09/30/2021 2.50     Absolute Lymph # 09/30/2021 2.06     Absolute Mono # 09/30/2021 0.57     Absolute Eos # 09/30/2021 0.26     Basophils Absolute 09/30/2021 0.06     Absolute Immature Granul* 09/30/2021 <0.03     WBC Morphology 09/30/2021 NOT REPORTED  RBC Morphology 09/30/2021 NOT REPORTED     Platelet Estimate 64/44/2689 NOT REPORTED     Glucose 09/30/2021 113*    BUN 09/30/2021 16     CREATININE 09/30/2021 0.81     Bun/Cre Ratio 09/30/2021 NOT REPORTED     Calcium 09/30/2021 8.6     Sodium 09/30/2021 139     Potassium 09/30/2021 4.8     Chloride 09/30/2021 104     CO2 09/30/2021 21     Anion Gap 09/30/2021 14     Alkaline Phosphatase 09/30/2021 52     ALT 09/30/2021 36*    AST 09/30/2021 27     Total Bilirubin 09/30/2021 0.33     Total Protein 09/30/2021 7.0     Albumin 09/30/2021 4.3     Albumin/Globulin Ratio 09/30/2021 1.6     GFR Non- 09/30/2021 >60     GFR  09/30/2021 >60     GFR Comment 09/30/2021          GFR Staging 09/30/2021 NOT REPORTED     Cholesterol, Fasting 09/30/2021 205*    HDL 09/30/2021 48     LDL Cholesterol 09/30/2021 134*    Chol/HDL Ratio 09/30/2021 4.3     Triglyceride, Fasting 09/30/2021 113     VLDL 09/30/2021 NOT REPORTED     TSH 09/30/2021 0.89     Thyroxine, Free 09/30/2021 1.17    Hospital Outpatient Visit on 08/30/2021   Component Date Value    Dermatology Pathology Re* 08/27/2021                      Value:-- Diagnosis --  SKIN, RIGHT BUTTOCK, SHAVE BIOPSY:       -  INFLAMED LIPOFIBROMA. Alexandra Stinson M.D.  **Electronically Signed Out**  jet/9/3/2021       Clinical Information  Pre-op Diagnosis:  FIBROMA vs ATYPICAL   Operative Findings:  RIGHT BUTTOCK  6 MM AND FIRM, FLESH-COLORED  NODULE  Operation Performed:  SHAVE    Source of Specimen  1: RIGHT BUTTOCK    Gross Description  \"BRITTANY MARTIN, RIGHT BUTTOCK\" 1.2 x 1.1 x 0.6 cm tan nodule with a  fatty surface. Inked and bisected 1cs. tm      Microscopic Description  The sections show a pedunculated fibroepithelial growth with a central  core of lipomatous and fibrovascular connective tissue covered by a  slightly acanthotic epidermis. Patchy lymphocytic inflammation is  present.   There is no evidence of malignancy. SURGICAL PATHOLOGY CONSULTATION       Patient Name: Ting Curtis Med Rec: [id number]  Path Number: [id number]    [facility]  ANATOMIC PATHOLOGY  [address]                          [address], [address]  [phone]  Fax: [phone]     Orders Only on 08/17/2021   Component Date Value    Glucose 08/17/2021 102*    Cholesterol 08/17/2021 236*    HDL 08/17/2021 48     LDL Cholesterol 08/17/2021 154*    Chol/HDL Ratio 08/17/2021 4.9     Triglycerides 08/17/2021 171*    VLDL 08/17/2021 NOT REPORTED*    Patient Fasting? 08/17/2021 YES        Assessment and Plan      1. Acute URI  -     COVID-19; Future  -     POCT rapid strep A  -     benzonatate (TESSALON) 200 MG capsule; Take 1 capsule by mouth 3 times daily as needed for Cough, Disp-30 capsule, R-0Normal          I believe that this is likely a viral illness based on the physical exam findings. Negative rapid strep  Covid test pending  Tessalon sent  Tylenol/Motrin for fever/discomfort. Patient agreeable to treatment plan. Educational materials provided on AVS.  Follow up if symptoms do not improve/worsen. Results for orders placed or performed in visit on 12/28/21   POCT rapid strep A   Result Value Ref Range    Strep A Ag None Detected None Detected             Return if symptoms worsen or fail to improve. Orders Placed This Encounter   Medications    benzonatate (TESSALON) 200 MG capsule     Sig: Take 1 capsule by mouth 3 times daily as needed for Cough     Dispense:  30 capsule     Refill:  0        Patient given educational materials - see patient instructions. Discussed use, benefit, and side effects of prescribed medications. All patientquestions answered. Pt voiced understanding. Patient given educational materials - see patient instructions. Discussed use, benefit, and side effects of prescribed medications. All patientquestions answered. Pt voiced understanding. This note was transcribed using dictation with Dragon services. Efforts were made to correct any errors but some words may be misinterpreted.     Patient assumes risks associated with failure to complete recommended testing and treatments in a timely manner    Electronically signed by JADE Grigsby CNP on 12/28/2021at 1:11 PM

## 2021-12-29 ENCOUNTER — HOSPITAL ENCOUNTER (OUTPATIENT)
Age: 46
Setting detail: SPECIMEN
Discharge: HOME | End: 2021-12-29

## 2021-12-29 DIAGNOSIS — J06.9 ACUTE URI: ICD-10-CM

## 2021-12-30 ENCOUNTER — HOSPITAL ENCOUNTER (OUTPATIENT)
Dept: PHYSICAL THERAPY | Facility: CLINIC | Age: 46
Setting detail: THERAPIES SERIES
Discharge: HOME | End: 2021-12-30
Payer: COMMERCIAL

## 2021-12-30 LAB
SARS-COV-2: ABNORMAL
SARS-COV-2: DETECTED
SOURCE: ABNORMAL

## 2021-12-30 PROCEDURE — 97110 THERAPEUTIC EXERCISES: CPT

## 2021-12-30 PROCEDURE — 97140 MANUAL THERAPY 1/> REGIONS: CPT

## 2021-12-30 NOTE — FLOWSHEET NOTE
10-20 reps      Comprehension of Education:  [x] Verbalizes understanding. [x] Demonstrates understanding. [] Needs review. [] Demonstrates/verbalizes HEP/Ed previously given. Plan: [x] Continue current frequency toward long and short term goals.     [x] Specific Instructions for subsequent treatments:       Time In:1200  Time Out: 1228    Electronically signed by:  Lilian Hunter PT

## 2022-01-04 ENCOUNTER — HOSPITAL ENCOUNTER (OUTPATIENT)
Dept: PHYSICAL THERAPY | Facility: CLINIC | Age: 47
Setting detail: THERAPIES SERIES
Discharge: HOME | End: 2022-01-04
Payer: COMMERCIAL

## 2022-01-04 PROCEDURE — 97140 MANUAL THERAPY 1/> REGIONS: CPT

## 2022-01-04 PROCEDURE — 97110 THERAPEUTIC EXERCISES: CPT

## 2022-01-04 PROCEDURE — 97016 VASOPNEUMATIC DEVICE THERAPY: CPT

## 2022-01-04 NOTE — FLOWSHEET NOTE
[] [facility]) [facility]  Therapy  [address]  [phone]  F: [phone] [] [address]  [facility] [address]   [address]  P: [phone]  F: [phone] [] [address] &  Therapy  [address] [facility]  P: [phone]  F: [phone] [x] [address]  P: [phone]  F: [phone] [] [address]  [facility]   [address]   [place]: [phone]  F: [phone]      Physical Therapy Daily Treatment Note    Date:  2022  Patient Name:  Sarah Hylton    :  1975  MRN: [id number]  Physician: Dr. Ingram Nicky: [address] ([address], no copay, ded 1000/510 remain, 10%)  Medical Diagnosis: L ankle pain      Rehab Codes: M25.672, M25.572  Onset date: 21                           Next 's appt. :    Visit# / total visits: 3      Cancels/No Shows: 0/0    Subjective:    Pain:  [x] Yes  [] No Location: L ankle Pain Rating: (0-10 scale) 4/10  Pain altered Tx:  [x] No  [] Yes  Action:  Comments: Patient states that her foot feels about the same, mild pain just medial to the navicular. States she feels that her foot slides in her shoe with her inserts.      Objective:  Modalities: none  Precautions: standard  Exercises:  Exercise Reps/ Time Weight/ Level Issued to HEP Completed Comments   Mat based             1 legged bridge 2x10          Zehra hip abd 2x10          Clamshells 2x10 blue        Kylah calf stretch 3x30\"   x      Toe yoga 2x10       4 way ankle 10x ea blue                           Gym            BAPS 3x10 L2     CW/CCW   Burrito stretch 3x30\"          Lateral step down 2x10 6\"   x     Anterior step down 2 x 10 6\"  x    Monster walk 4x20' blue X x Band on feet   MOBO 2x10  2/4    Functional reach ant-lat-post  Lunge walks       x  Forward and lateral    SLS             Other:   Manual  1. STM supine to L calf  2. STM to L PF  3. Manual stretching to L GT into ext    Treatment Charges: Mins Units   []  Modalities     [x]  Ther Exercise 15 1   [x]  Manual Therapy 25 2   []  Ther Activities     []  Aquatics     [x]  Vasocompression 15 1   []  Other     Total Treatment time 55 4       Assessment: [x] Progressing toward goals. Completed STM to the gastroc, post tib, plantar fascia and anterior tibialis with min improvement in symptoms. Completed exercises as listed above with the addition of anterior heel taps to improve eccentric DF control and single leg stance on foam pad to improve ankle stability. No exercises added to HEP in order to assess response to added exercises, will progress as tolerated. [] No change. [] Other:  [x] Patient would continue to benefit from skilled physical therapy services in order to: decrease muscle spasm of gastroc and medial calf, improve functional mobility in all planes of movement on a tennis court. STG/LTG     STG: (to be met in 6 treatments)  1. ? Pain: <3/10 at the worst  2. ? ROM: with DF and no pain  3. ? Strength:  4. ? Function: LEFI >73/80  5. Patient to be independent with home exercise program as demonstrated by performance with correct form without cues. 6. Able to play tennis at 75% without increase in pain  LTG: (to be met in 12 treatments)  1. No pain in either foot. 2. Able to play tennis with no issues during or after. 3. No increase in pain when doing calf raises  4. No pain with GTE                     Patient goals:\"help pain to go away\"    Pt. Education:  [x] Yes  [] No  [] Reviewed Prior HEP/Ed  Method of Education: [x] Verbal  [x] Demo  [x] Written  Access Code: [email or web address]  URL: [email or web address]  Date: 12/28/2021  Prepared by: Janett Aguilera    Exercises  Single Leg Bridge - 1-2 x daily - 7 x weekly - 2 sets - 10 reps  Side Stepping with Resistance at Feet - 2 x daily - 7 x weekly - 4 sets - 10-15 reps  Lateral Step Down - 1-2 x daily - 7 x weekly - 2 sets - 10 reps  Calf stretch - 2 x daily - 7 x weekly - 1 sets - 3 reps - 30 sec hold  calf stretch w/ towel under big toe - 2 x daily - 7 x weekly - 1 sets - 3 reps - 30 sec hold  Sidelying Hip Abduction - 2 x daily - 7 x weekly - 2 sets - 10 reps  Clamshell with Resistance - 2 x daily - 7 x weekly - 2 sets - 10-20 reps      Comprehension of Education:  [x] Verbalizes understanding. [x] Demonstrates understanding. [] Needs review. [] Demonstrates/verbalizes HEP/Ed previously given. Plan: [x] Continue current frequency toward long and short term goals. [x] Specific Instructions for subsequent treatments:       Time In: 3:02 pm  Time Out: 4:00 pm    Electronically signed by:   Rodger Walsh, PT

## 2022-01-06 ENCOUNTER — HOSPITAL ENCOUNTER (OUTPATIENT)
Dept: PHYSICAL THERAPY | Facility: CLINIC | Age: 47
Setting detail: THERAPIES SERIES
Discharge: HOME | End: 2022-01-06
Payer: COMMERCIAL

## 2022-01-06 PROCEDURE — 97016 VASOPNEUMATIC DEVICE THERAPY: CPT

## 2022-01-06 PROCEDURE — 97110 THERAPEUTIC EXERCISES: CPT

## 2022-01-06 PROCEDURE — 97140 MANUAL THERAPY 1/> REGIONS: CPT

## 2022-01-06 NOTE — FLOWSHEET NOTE
Collette Moelleraris    Exercises  Single Leg Bridge - 1-2 x daily - 7 x weekly - 2 sets - 10 reps  Side Stepping with Resistance at Feet - 2 x daily - 7 x weekly - 4 sets - 10-15 reps  Lateral Step Down - 1-2 x daily - 7 x weekly - 2 sets - 10 reps  Calf stretch - 2 x daily - 7 x weekly - 1 sets - 3 reps - 30 sec hold  calf stretch w/ towel under big toe - 2 x daily - 7 x weekly - 1 sets - 3 reps - 30 sec hold  Sidelying Hip Abduction - 2 x daily - 7 x weekly - 2 sets - 10 reps  Clamshell with Resistance - 2 x daily - 7 x weekly - 2 sets - 10-20 reps      Comprehension of Education:  [x] Verbalizes understanding. [x] Demonstrates understanding. [] Needs review. [] Demonstrates/verbalizes HEP/Ed previously given. Plan: [x] Continue current frequency toward long and short term goals.     [x] Specific Instructions for subsequent treatments:       Time In: 3:10pm  Time Out: 4:00 pm    Electronically signed by:  Luke Phillips PTA

## 2022-01-11 ENCOUNTER — HOSPITAL ENCOUNTER (OUTPATIENT)
Dept: PHYSICAL THERAPY | Facility: CLINIC | Age: 47
Setting detail: THERAPIES SERIES
Discharge: HOME | End: 2022-01-11
Payer: COMMERCIAL

## 2022-01-11 PROCEDURE — 97110 THERAPEUTIC EXERCISES: CPT

## 2022-01-11 PROCEDURE — 97140 MANUAL THERAPY 1/> REGIONS: CPT

## 2022-01-11 PROCEDURE — 97016 VASOPNEUMATIC DEVICE THERAPY: CPT

## 2022-01-11 NOTE — FLOWSHEET NOTE
[] [facility]) - [facility] &  Therapy  [address]  [phone]  F: [phone] [] [address]  [employer] [address]   [address]  P: [phone]  F: [phone] [] [address]  Therapy  [address]  P: [phone]  F: [phone] [x] [address]  P: [phone]  F: [phone] [] [address]  [facility]   [address]   [place]: [phone]  F: [phone]      Physical Therapy Daily Treatment Note    Date:  2022  Patient Name:  Stephan Bansal    :  1975  MRN: [id number]  Physician: Dr. Dawna Stark: [address] ([address], no copay, ded 1000/510 remain, 10%)  Medical Diagnosis: L ankle pain      Rehab Codes: M25.672, M25.572  Onset date: 21                           Next Dr's appt. :    Visit# / total visits:       Cancels/No Shows: 0/0    Subjective:    Pain:  [x] Yes  [] No Location: L foot Pain Rating: (0-10 scale) 0-3/10  Pain altered Tx:  [x] No  [] Yes  Action:  Comments: Patient states her pain has continued to improve.  She played Tennis on  (2022) with decreased pain symptoms post.     Objective:  Modalities: none  Precautions: standard  Exercises:  Exercise Reps/ Time Weight/ Level Issued to HEP Completed Comments   Mat based             1 legged bridge 2x10          Zehra hip abd 2x10          Clamshells 2x10 blue        Kylah calf stretch 3x30\"   x      Toe yoga 2x10   x Standing    Foot doming  2x10 3\"  x Standing    4 way ankle 10x ea blue                           Gym            BAPS 3x10 L2     CW/CCW   Burrito stretch 3x30\"          Lateral step down 2x10 6\"   x     Anterior step down 2 x 10 6\"  x    Monster walk 4x20' blue X x Band on feet   MOBO 2x10  2/4    Functional reach ant-lat-post    3 way reach  3x8        Lunge walks  2 laps ea     x  Forward and lateral    SLS 3x30\"  AirEx        Ladder drills  3 x each   x 2 feet each square forward   2 in one out lateral                    Other:   Manual  1. STM supine to L calf  2. STM to L PF  3. Hypervolt to L gastroc/soleus    Treatment Charges: Mins Units   []  Modalities     [x]  Ther Exercise 20 1   [x]  Manual Therapy 15 1   []  Ther Activities     []  Aquatics     [x]  Vasocompression 15 1   []  Other     Total Treatment time 50 4       Assessment: [x] Progressing toward goals. Due to continued improvement in symptoms continued manual therapy as listed above with mild improvement in closed chain dorsiflexion ROM. Provided A/P TC mulligan mobilization due to complaints of anterior ankle pain. Completed exercises as listed above with improvement noted in standing foot intrinsic muscle control. Added ladder drills at submaximal speed <50% to assess movement and foot posture; the patient move laterally with her COM medially to the left foot. Therefore provided cues to have COM to be placed over LE. Finished the session with vaso compression to minimize post session soreness. Will progress as tolerated. [] No change. [] Other:  [x] Patient would continue to benefit from skilled physical therapy services in order to: decrease muscle spasm of gastroc and medial calf, improve functional mobility in all planes of movement on a tennis court. STG/LTG     STG: (to be met in 6 treatments)  1. ? Pain: <3/10 at the worst  2. ? ROM: with DF and no pain  3. ? Strength:  4. ? Function: LEFI >73/80  5. Patient to be independent with home exercise program as demonstrated by performance with correct form without cues. 6. Able to play tennis at 75% without increase in pain  LTG: (to be met in 12 treatments)  1. No pain in either foot. 2. Able to play tennis with no issues during or after.   3. No increase in pain when doing calf raises  4. No pain with GTE                     Patient goals:\"help pain to go away\"    Pt. Education:  [x] Yes  [] No  [] Reviewed Prior HEP/Ed  Method of Education: [x] Verbal  [x] Demo  [x] Written  Access Code: [email or web address]  URL: [email or web address]  Date: 12/28/2021  Prepared by: Deejay Hoffman    Exercises  Single Leg Bridge - 1-2 x daily - 7 x weekly - 2 sets - 10 reps  Side Stepping with Resistance at Feet - 2 x daily - 7 x weekly - 4 sets - 10-15 reps  Lateral Step Down - 1-2 x daily - 7 x weekly - 2 sets - 10 reps  Calf stretch - 2 x daily - 7 x weekly - 1 sets - 3 reps - 30 sec hold  calf stretch w/ towel under big toe - 2 x daily - 7 x weekly - 1 sets - 3 reps - 30 sec hold  Sidelying Hip Abduction - 2 x daily - 7 x weekly - 2 sets - 10 reps  Clamshell with Resistance - 2 x daily - 7 x weekly - 2 sets - 10-20 reps      Comprehension of Education:  [x] Verbalizes understanding. [x] Demonstrates understanding. [] Needs review. [] Demonstrates/verbalizes HEP/Ed previously given. Plan: [x] Continue current frequency toward long and short term goals. [x] Specific Instructions for subsequent treatments:       Time In: 12:00 pm  Time Out: 12:58 pm    Electronically signed by:   Kathleen Yang PT

## 2022-01-12 NOTE — FLOWSHEET NOTE
[] [address]  [address]    [phone]  F: [phone]   [] [address]  [place] [address]   [address]  P: [phone]  F: [phone]  [] [place]  [address]  P: [phone]  F: [phone] [] [email or web address]  P: [phone]  F: [phone]  [] [address]  [address] [facility] [address]   Rishabh Rice: [phone]  F: [phone]   [] [address]  Julel Marihty: [phone]   F: [phone]     Physical Therapy Cancel/No Show note    Date: 2022  Patient: Vernell Rodriguez  : 1975  MRN: [id number]    Cancels/No Shows to date:     For today's appointment patient:    [x]  Cancelled    [] Rescheduled appointment    [] No-show     Reason given by patient:    []  Patient ill    []  Conflicting appointment    [] No transportation      [] Conflict with work    [x] No reason given    [] Weather related    [] COVID-19    [] Other:      Comments:        [x] Next appointment was confirmed    Electronically signed by: Jacqueline Johnson PT

## 2022-01-13 ENCOUNTER — HOSPITAL ENCOUNTER (OUTPATIENT)
Dept: PHYSICAL THERAPY | Facility: CLINIC | Age: 47
Setting detail: THERAPIES SERIES
Discharge: HOME | End: 2022-01-13
Payer: COMMERCIAL

## 2022-01-18 ENCOUNTER — HOSPITAL ENCOUNTER (OUTPATIENT)
Dept: PHYSICAL THERAPY | Facility: CLINIC | Age: 47
Setting detail: THERAPIES SERIES
End: 2022-01-18
Payer: COMMERCIAL

## 2022-01-21 ENCOUNTER — HOSPITAL ENCOUNTER (OUTPATIENT)
Dept: PHYSICAL THERAPY | Facility: CLINIC | Age: 47
Setting detail: THERAPIES SERIES
Discharge: HOME | End: 2022-01-21
Payer: COMMERCIAL

## 2022-01-21 PROCEDURE — 97140 MANUAL THERAPY 1/> REGIONS: CPT

## 2022-01-21 PROCEDURE — 97016 VASOPNEUMATIC DEVICE THERAPY: CPT

## 2022-01-21 PROCEDURE — 97110 THERAPEUTIC EXERCISES: CPT

## 2022-01-21 NOTE — FLOWSHEET NOTE
[] [facility]) - [facility] TWELVESTEP [facility] &  Therapy  [address]  [phone]  F: [phone] [] [address]  [address]   [address]  P: [phone]  F: [phone] [] [address]  Therapy  [address]  P: [phone]  F: [phone] [x] [address]  P: [phone]  F: [phone] [] [address]  [facility]   [address]   [place]: [phone]  F: [phone]      Physical Therapy Daily Treatment Note    Date:  2022  Patient Name:  Tegan Baer    :  1975  MRN: [id number]  Physician: Dr. Omar Dimas: [address] ([address], no copay, ded 1000/510 remain, 10%)  Medical Diagnosis: L ankle pain      Rehab Codes: M25.672, M25.572  Onset date: 21                           Next Dr's appt. :    Visit# / total visits:       Cancels/No Shows: 0/0    Subjective:    Pain:  [x] Yes  [] No Location: L foot Pain Rating: (0-10 scale) 0-3/10  Pain altered Tx:  [x] No  [] Yes  Action:  Comments: Patient states that she continues to have soreness over the midfoot. Minimal progression with pain symptoms with therapy.       Objective:  Modalities: none  Precautions: standard  Exercises:  Exercise Reps/ Time Weight/ Level Issued to HEP Completed Comments   Mat based             1 legged bridge 2x10          Zehra hip abd 2x10          Clamshells 2x10 blue        Kylah calf stretch 3x30\"   x      Toe yoga 2x10   x Standing    Foot doming  2x10 3\"  x Standing    4 way ankle 10x ea blue                           Gym            BAPS 3x10 L2     CW/CCW   Burrito stretch 3x30\"          Lateral step down 2x10 6\"        Anterior step down 2 x 10 6\"      Monster walk 4x20' blue X  Band on feet   MOBO 2x10  2/4    Functional reach ant-lat-post    3 way reach  3x8        Lunge walks  2 laps ea     x  Forward and lateral    SLS 3x30\"  AirEx   x     Ladder drills  3 x each    2 feet each square forward   2 in one out lateral                    Other:   Manual  1. STM supine to L calf  2. STM to L PF  3. Hypervolt to L gastroc/soleus    Treatment Charges: Mins Units   []  Modalities     [x]  Ther Exercise 20 1   [x]  Manual Therapy 20 1   []  Ther Activities     []  Aquatics     [x]  Vasocompression 10 1   []  Other     Total Treatment time 50 3       Assessment: [x] Progressing toward goals. Due to minimal improvements in symptoms the patient's foot posture was assessed. No obvious rearfoot or forefoot varus, however callus development over the lateral boarder noted. Advised patient to attempt 1 day put of orthotic in the left shoe to assess response to decreased medial support. Completed exercises as above without shoes with cues for maintaining medial arch and 1st ray flexion. No pain during exercises. Will progress as tolerated and schedule with evaluating therapist.      [] No change. [] Other:  [x] Patient would continue to benefit from skilled physical therapy services in order to: decrease muscle spasm of gastroc and medial calf, improve functional mobility in all planes of movement on a tennis court. STG/LTG     STG: (to be met in 6 treatments)  1. ? Pain: <3/10 at the worst  2. ? ROM: with DF and no pain  3. ? Strength:  4. ? Function: LEFI >73/80  5. Patient to be independent with home exercise program as demonstrated by performance with correct form without cues. 6. Able to play tennis at 75% without increase in pain  LTG: (to be met in 12 treatments)  1. No pain in either foot. 2. Able to play tennis with no issues during or after. 3. No increase in pain when doing calf raises  4. No pain with GTE                     Patient goals:\"help pain to go away\"    Pt.  Education:  [x] Yes  [] No  [] Reviewed Prior HEP/Ed  Method

## 2022-01-25 ENCOUNTER — HOSPITAL ENCOUNTER (OUTPATIENT)
Dept: PHYSICAL THERAPY | Facility: CLINIC | Age: 47
Setting detail: THERAPIES SERIES
Discharge: HOME | End: 2022-01-25
Payer: COMMERCIAL

## 2022-01-25 PROCEDURE — 97140 MANUAL THERAPY 1/> REGIONS: CPT

## 2022-01-25 PROCEDURE — 97530 THERAPEUTIC ACTIVITIES: CPT

## 2022-01-25 NOTE — FLOWSHEET NOTE
[] [facility]) - [facility] &  Therapy  [address]  [phone]  F: [phone] [] [address]  [place] [address]   [address]  P: [phone]  F: [phone] [] [address]  Therapy  [address] [facility]  P: [phone]  F: [phone] [x] [address]  P: [phone]  F: [phone] [] [address]  [facility]   [address]   [place]: [phone]  F: [phone]      Physical Therapy Daily Treatment Note    Date:  2022  Patient Name:  Naty Brown   :  1975  MRN: [id number]  Physician: Dr. Patel Martiniters: [facility] ([address], no copay, ded 1000/510 remain, 10%)  Medical Diagnosis: L ankle pain       Rehab Codes: M25.672, M25.572  Onset date: 21                          Next Dr's appt. :    Visit# / total visits:    Cancels/No Shows: 0/0    Subjective:    Pain:  [x] Yes  [] No Location: L foot Pain Rating: (0-10 scale) 0-4/10  Pain altered Tx:  [x] No  [] Yes  Action:  Comments: Patient states that she played tennis on 2 Sundays with L orthotic out and no change. Still hurt. She wore a plantar fasciitis sleeve and it felt better. She had no pain, but then drove to the other clinic and felt an increase in pain. Pain is always there. She uses a percussion gun on her calves followed by stretching.        Objective:  Modalities: none  Precautions: standard  Exercises:  Exercise Reps/ Time Weight/ Level Issued to HEP Completed Comments   Mat based             1 legged bridge 2x10          Zehra hip abd 2x10          Clamshells 2x10 blue        Kylah calf stretch 3x30\"   x      Toe yoga 2x10   x Standing    Foot doming  2x10 3\"  x Standing    4 way ankle 10x ea blue                           Gym          BAPS 3x10 L2     CW/CCW   Burrito stretch 3x30\"          Lateral step down 2x10 6\"        Anterior step down 2 x 10 6\"      Monster walk 4x20' blue X  Band on feet   MOBO 2x10  2/4    Functional reach ant-lat-post    3 way reach  3x8        Lunge walks  2 laps ea     x  Forward and lateral    SLS 3x30\"  AirEx   x     Ladder drills  3 x each    2 feet each square forward   2 in one out lateral                    Other:   Manual  1. STM to L PF    Treatment Charges: Mins Units   []  Modalities     []  Ther Exercise     [x]  Manual Therapy 15 1   [x]  Ther Activities 20 1   []  Aquatics     []  Vasocompression     []  Other     Total Treatment time 35 2       Assessment: [x] Progressing toward goals. Discussed other options for exercise. She is free to use her Peloton bike but monitor how tight her cycling shoes are. She declined a rowing machine as this will significantly increase her pain. She does not have access to a pool for lap swimming or deep water running. After reviewing her footwear while playing tennis, it was discovered that her tennis shoes are stability shoes. It was recommended that she purchase neutral shoes since she has orthotics. Her plantar fascia remains very tight.      [] No change. [] Other:  [x] Patient would continue to benefit from skilled physical therapy services in order to: decrease muscle spasm of gastroc and medial calf, improve functional mobility in all planes of movement on a tennis court. STG/LTG     STG: (to be met in 6 treatments)  1. ? Pain: <3/10 at the worst  2. ? ROM: with DF and no pain  3. ? Strength:  4. ? Function: LEFI >73/80  5. Patient to be independent with home exercise program as demonstrated by performance with correct form without cues. 6. Able to play tennis at 75% without increase in pain  LTG: (to be met in 12 treatments)  1. No pain in either foot. 2. Able to play tennis with no issues during or after.   3. No increase in pain when doing calf raises  4. No pain with GTE                     Patient goals:\"help pain to go away\"    Pt. Education:  [x] Yes  [] No  [] Reviewed Prior HEP/Ed  Method of Education: [x] Verbal  [x] Demo  [x] Written  Access Code: [email or web address]  URL: [email or web address]  Date: 12/28/2021  Prepared by: Raffi Chaudhry    Exercises  Single Leg Bridge - 1-2 x daily - 7 x weekly - 2 sets - 10 reps  Side Stepping with Resistance at Feet - 2 x daily - 7 x weekly - 4 sets - 10-15 reps  Lateral Step Down - 1-2 x daily - 7 x weekly - 2 sets - 10 reps  Calf stretch - 2 x daily - 7 x weekly - 1 sets - 3 reps - 30 sec hold  calf stretch w/ towel under big toe - 2 x daily - 7 x weekly - 1 sets - 3 reps - 30 sec hold  Sidelying Hip Abduction - 2 x daily - 7 x weekly - 2 sets - 10 reps  Clamshell with Resistance - 2 x daily - 7 x weekly - 2 sets - 10-20 reps      Comprehension of Education:  [x] Verbalizes understanding. [x] Demonstrates understanding. [] Needs review. [] Demonstrates/verbalizes HEP/Ed previously given. Plan: [x] Continue current frequency toward long and short term goals.     [x] Specific Instructions for subsequent treatments:       Time In: 1200  Time Out: 1250    Electronically signed by:  Raffi Chaudhry, PT

## 2022-01-27 ENCOUNTER — HOSPITAL ENCOUNTER (OUTPATIENT)
Dept: PHYSICAL THERAPY | Facility: CLINIC | Age: 47
Setting detail: THERAPIES SERIES
End: 2022-01-27
Payer: COMMERCIAL

## 2022-02-01 ENCOUNTER — OFFICE VISIT (OUTPATIENT)
Dept: ORTHOPEDIC SURGERY | Age: 47
End: 2022-02-01
Payer: COMMERCIAL

## 2022-02-01 VITALS — HEIGHT: 60 IN | RESPIRATION RATE: 12 BRPM | BODY MASS INDEX: 40.25 KG/M2 | WEIGHT: 205 LBS

## 2022-02-01 DIAGNOSIS — M72.2 PLANTAR FASCIITIS: ICD-10-CM

## 2022-02-01 DIAGNOSIS — Q74.2 PAIN ASSOCIATED WITH ACCESSORY NAVICULAR BONE OF LEFT FOOT: ICD-10-CM

## 2022-02-01 DIAGNOSIS — M79.672 PAIN ASSOCIATED WITH ACCESSORY NAVICULAR BONE OF LEFT FOOT: ICD-10-CM

## 2022-02-01 DIAGNOSIS — M19.079 ARTHRITIS OF MIDFOOT: Primary | ICD-10-CM

## 2022-02-01 DIAGNOSIS — M21.861 GASTROCNEMIUS EQUINUS OF RIGHT LOWER EXTREMITY: ICD-10-CM

## 2022-02-01 PROCEDURE — 99213 OFFICE O/P EST LOW 20 MIN: CPT | Performed by: ORTHOPAEDIC SURGERY

## 2022-02-01 NOTE — PROGRESS NOTES
[address] [place] [address] AND SPORTS MEDICINE  [facility] Joe Velazquez  [address] [place] [id number]  Dept: [phone]    Ambulatory Orthopedic Consult      CHIEF COMPLAINT:    Chief Complaint   Patient presents with    Ankle Pain     left       HISTORY OF PRESENT ILLNESS:      The patient is a 56 y.o. female who is being seen for evaluation of the above, which began in the summer 2021 atraumatically  . At today's visit, she is using no brace/assistive device. History is obtained today from:   [x]  the patient     [x]  EMR     []  one family member/friend    []  multiple family members/friends    []  other: On the right, she localizes her pain to her plantar medial heel, and reports pain with her first steps in the morning. She reports that she is previously seen Dr. Tiki Stringer for this, and was diagnosed with a stress fracture. On the left, she localizes her pain to her dorsal/medial midfoot and medial hindfoot, and reports some pain that radiates into her medial/plantar forefoot. INTERVAL HISTORY 2/1/2022:  She is seen again today in the office for follow up of a previous issue (as above). Since being seen last, the patient is doing about the same overall. At today's visit, she is not using a brace or assistive device. History is obtained today from:   [x]  the patient     []  EMR     []  one family member/friend    []  multiple family members/friends    []  other:      She reports that the pain in her right foot has improved, but reports that her left foot is still bothering her in terms of pain, and localizes her pain to her midfoot. She reports that her left foot pain hurts with playing tennis. REVIEW OF SYSTEMS:  Musculoskeletal: See HPI for pertinent positives     Past Medical History:    She  has a past medical history of Abnormal Pap smear (2005), Anxiety, and GDM (gestational diabetes mellitus) (2007).      Past Surgical History: She  has a past surgical history that includes intrauterine device insertion (05/2013); Colposcopy; Cervix biopsy; LEEP (2005); Intrauterine Device Removal (07/10/2018); and intrauterine device insertion (07/10/2018). Current Medications:     Current Outpatient Medications:     venlafaxine (EFFEXOR XR) 150 MG extended release capsule, TAKE 1 CAPSULE BY MOUTH ONE TIME A DAY, Disp: 90 capsule, Rfl: 0    naproxen (EC NAPROSYN) 500 MG EC tablet, Take 1 tablet by mouth 2 times daily as needed for Pain, Disp: 60 tablet, Rfl: 0    levothyroxine (SYNTHROID) 50 MCG tablet, TAKE 1 TABLET BY MOUTH ONE TIME A DAY, Disp: 90 tablet, Rfl: 2     Allergies:    Patient has no known allergies. Family History:  family history includes Cancer in her father and maternal grandfather; Diabetes in her father; Heart Disease in her maternal aunt, maternal grandfather, and maternal grandmother; High Cholesterol in her mother; Other in her father and paternal grandfather. Social History:   Social History     Occupational History    Not on file   Tobacco Use    Smoking status: Former Smoker    Smokeless tobacco: Never Used   Vaping Use    Vaping Use: Never used   Substance and Sexual Activity    Alcohol use: No    Drug use: No    Sexual activity: Yes     Birth control/protection: I.U.D. Radiology manager full-time at [employer]:  Resp 12   Ht 5' (1.524 m)   Wt 205 lb (93 kg)   BMI 40.04 kg/m²    Psych: awake, alert  Cardio:  well perfused extremities, no cyanosis  Resp:  normal respiratory effort  Musculoskeletal:    RLE:  Vascular: Limb well perfused, compartments soft/compressible. Skin: No erythema/ulcers. Intact. Neurovascular Status:  Grossly neurovascularly intact throughout   Tenderness to Palpation: Plantar heel  -Gastroc equinus  -Negative squeeze test of the calcaneus      LLE:  Vascular: Limb well perfused, compartments soft/compressible. Skin: No erythema/ulcers. Intact. Neurovascular Status:  Grossly neurovascularly intact throughout   Tenderness to Palpation: Medial midfoot greater than hindfoot (distal course the posterior tibial tendon at the accessory navicular)  -No significant pain with resisted inversion      RADIOLOGY:   2/1/2022 No new radiology images today. Prior images reviewed for reference. FINDINGS:  Three views (AP, Mortise, and Lateral) of the bilateral ankle and three views (AP, Oblique, Lateral) of the bilateral foot were obtained in the office today and reviewed, revealing no acute fracture, dislocation, or radiopaque foreign body/tumor. 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 IMPRESSION:  No acute fracture/dislocation. Degenerative changes as above, and accessory navicular. Electronically signed by Charlie Cesar MD      Relevant previous imaging reviewed, both imaging and report(s) as below:    No results found. ASSESSMENT AND PLAN:         She has left greater than right foot pain, secondary to multiple issues. 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 On the right, she has plantar fasciitis and a gastroc equinus contracture (was previously diagnosed in March 2021 with a right calcaneal stress fracture). On the right, her pain has significantly improved, however, she reports that her left foot pain is still bothering her. Notably, she has no relevant past medical history. We had a discussion today about the likely diagnosis and its natural history, physical exam and imaging findings, as well as various treatment options in detail.     Surgically, we have discussed that I do not recommend surgery at this time, and I did recommend conservative management. In the future, she may potentially benefit from a right gastroc recession, and/or left modified Kidner type procedure versus midfoot fusion with bone grafting and gastroc recession. At today's visit, she states that she has no limitations in terms of walking, and only reports pain in her left foot with point tenderness. Orders/referrals were placed as below at today's visit. The patient may continue to use the night splint, and I recommended continuing home exercises/physical therapy. The patient may continue to use heel cups when ambulatory as needed. On the left, she may continue to use her over-the-counter flatfoot style orthotic, and do home exercises per physical therapy. She may continue to take her prescribed oral NSAIDs. In order to know exactly how to proceed with treatment, an MRI was ordered today to evaluate the midfoot for arthritis. This is medically necessary to evaluate the structures in this area, for both diagnosis and treatment. All questions were answered and the above plan was agreed upon. The patient will return to clinic after the MRI has been obtained without x-rays. At her next visit, we may discuss a possible right naviculocuneiform joint injection.              At the patient's next visit, depending on how the patient is doing and/or new imaging/labs results, we may consider the following options:    []  Lace up ankle     []  CAM boot         []  removable wrist brace     []  PT:        []  Wean out immobilization         []  Adv activity      []  Footmind        []  Spenco       []  Custom Orthotic:               []  AZ brace                    []  Rocker Bottom      []  Night splint    []  Heel cups        []  Strap        []  Toe gizmos    []  Topl        []  NSAIDs         []  [place]        []  Ref:         []  Stress Xray    []  CT        []  MRI  []  Inj:          []  Consider OR      [] Pick OR date    No follow-ups on file. No orders of the defined types were placed in this encounter. No orders of the defined types were placed in this encounter. Denys Ware MD  Orthopedic Surgery        Please excuse any typos/errors, as this note was created with the assistance of voice recognition software. While intending to generate a document that actually reflects the content of the visit, the document can still have some errors including those of syntax and sound-a-like substitutions which may escape proof reading. In such instances, actual meaning can be extrapolated by context.

## 2022-02-17 ENCOUNTER — HOSPITAL ENCOUNTER (OUTPATIENT)
Dept: MRI IMAGING | Age: 47
Discharge: HOME | End: 2022-02-19
Payer: COMMERCIAL

## 2022-02-17 DIAGNOSIS — M19.079 ARTHRITIS OF MIDFOOT: ICD-10-CM

## 2022-02-17 DIAGNOSIS — M21.861 GASTROCNEMIUS EQUINUS OF RIGHT LOWER EXTREMITY: ICD-10-CM

## 2022-02-17 DIAGNOSIS — M79.672 PAIN ASSOCIATED WITH ACCESSORY NAVICULAR BONE OF LEFT FOOT: ICD-10-CM

## 2022-02-17 DIAGNOSIS — M72.2 PLANTAR FASCIITIS: ICD-10-CM

## 2022-02-17 DIAGNOSIS — Q74.2 PAIN ASSOCIATED WITH ACCESSORY NAVICULAR BONE OF LEFT FOOT: ICD-10-CM

## 2022-02-17 PROCEDURE — 73718 MRI LOWER EXTREMITY W/O DYE: CPT

## 2022-02-24 ENCOUNTER — OFFICE VISIT (OUTPATIENT)
Dept: ORTHOPEDIC SURGERY | Age: 47
End: 2022-02-24
Payer: COMMERCIAL

## 2022-02-24 VITALS — WEIGHT: 205 LBS | BODY MASS INDEX: 40.25 KG/M2 | RESPIRATION RATE: 12 BRPM | HEIGHT: 60 IN

## 2022-02-24 DIAGNOSIS — M79.672 PAIN ASSOCIATED WITH ACCESSORY NAVICULAR BONE OF LEFT FOOT: ICD-10-CM

## 2022-02-24 DIAGNOSIS — M19.079 ARTHRITIS OF MIDFOOT: Primary | ICD-10-CM

## 2022-02-24 DIAGNOSIS — Q74.2 PAIN ASSOCIATED WITH ACCESSORY NAVICULAR BONE OF LEFT FOOT: ICD-10-CM

## 2022-02-24 DIAGNOSIS — M67.40 GANGLION CYST: ICD-10-CM

## 2022-02-24 PROCEDURE — 99213 OFFICE O/P EST LOW 20 MIN: CPT | Performed by: ORTHOPAEDIC SURGERY

## 2022-02-24 NOTE — PROGRESS NOTES
[address] [place] [address] AND SPORTS MEDICINE  [facility] Yves Carr  [address] [address]  Dept: [phone]    Ambulatory Orthopedic Consult      CHIEF COMPLAINT:    Chief Complaint   Patient presents with    Foot Pain     left       HISTORY OF PRESENT ILLNESS:      The patient is a 56 y.o. female who is being seen for evaluation of the above, which began in the summer 2021 atraumatically  . At today's visit, she is using no brace/assistive device. History is obtained today from:   [x]  the patient     [x]  EMR     []  one family member/friend    []  multiple family members/friends    []  other: On the right, she localizes her pain to her plantar medial heel, and reports pain with her first steps in the morning. She reports that she is previously seen Dr. Donnie Hargrove for this, and was diagnosed with a stress fracture. On the left, she localizes her pain to her dorsal/medial midfoot and medial hindfoot, and reports some pain that radiates into her medial/plantar forefoot. INTERVAL HISTORY 2/1/2022:  She is seen again today in the office for follow up of a previous issue (as above). Since being seen last, the patient is doing about the same overall. At today's visit, she is not using a brace or assistive device. History is obtained today from:   [x]  the patient     []  EMR     []  one family member/friend    []  multiple family members/friends    []  other:      She reports that the pain in her right foot has improved, but reports that her left foot is still bothering her in terms of pain, and localizes her pain to her midfoot. She reports that her left foot pain hurts with playing tennis. INTERVAL HISTORY 2/24/2022:  She is seen again today in the office for follow up of imaging as below. Since being seen last, the patient is doing about the same overall. At today's visit, she is using no brace/assistive device.     History is RLE:  Vascular: Limb well perfused, compartments soft/compressible. Skin: No erythema/ulcers. Intact. Neurovascular Status:  Grossly neurovascularly intact throughout   Tenderness to Palpation: Plantar heel  -Gastroc equinus  -Negative squeeze test of the calcaneus      LLE:  Vascular: Limb well perfused, compartments soft/compressible. Skin: No erythema/ulcers. Intact. Neurovascular Status:  Grossly neurovascularly intact throughout   Tenderness to Palpation: Medial midfoot greater than hindfoot (distal course the posterior tibial tendon at the accessory navicular)--most pronounced at the medial aspect of the medial cuneiform  -No significant pain with resisted inversion      RADIOLOGY:   2/24/2022 Prior images reviewed for reference. MRI images and radiology report reviewed, as below:    1. Mild to moderate naviculocuneiform degenerative change. 2. Small ganglion cyst overlying the medial cuneiform. 3. Mild midfoot degenerative change, most pronounced at the 3rd   tarsometatarsal articulation. 4. Suspected prior lateral 2nd MTP plantar plate injury.                FINDINGS:  Three views (AP, Mortise, and Lateral) of the bilateral ankle and three views (AP, Oblique, Lateral) of the bilateral foot were obtained in the office today and reviewed, revealing no acute fracture, dislocation, or radiopaque foreign body/tumor. 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 IMPRESSION:  No acute fracture/dislocation. Degenerative changes as above, and accessory navicular. Electronically signed by Cassie Smith MD      Relevant previous imaging reviewed, both imaging and report(s) as below:    No results found. ASSESSMENT AND PLAN:       She has left greater than right foot pain, secondary to multiple issues.   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 On the right, she has plantar fasciitis and a gastroc equinus contracture (was previously diagnosed in March 2021 with a right calcaneal stress fracture). Her right heel pain has significantly improved. Notably, she has no relevant past medical history. We had a discussion today about the likely diagnosis and its natural history, physical exam and imaging findings, as well as various treatment options in detail. Surgically, we discussed a possible left foot cyst excision. We discussed the expected postoperative course, including the relevant weightbearing restrictions/immobilization. At today's visit, she states that she is not interested in surgical intervention at this time, but was more interested to hear her options. She has previously stated that she has no limitations in terms of walking, and only reports that the pain in her left foot is with direct compression. At today's visit, she briefly met with my surgical scheduler, Yvette Alatorre, and she will call us in the future if/when she wishes to proceed. Orders/referrals were placed as below at today's visit. For her left foot, we discussed a possible cyst aspiration, however she is not interested in this at today's visit. She may continue to use her over-the-counter flatfoot style orthotic, perform home exercises per physical therapy, and take oral NSAIDs as needed. For her right foot, she may continue to use her night splint, home exercises/physical therapy, and heel cups as needed. All questions were answered and the above plan was agreed upon.  The patient will return to clinic in the future as needed, or possibly for surgical discussion (left foot excisional biopsy of cyst)             At the patient's next visit, depending on how the patient is doing and/or new imaging/labs results, we may consider the following options:    []  Lace up ankle     []  CAM boot         []  removable wrist brace     []  PT:        []  Wean out immobilization         []  Adv activity      []  Footmind        []  Spenco       []  Custom Orthotic:               []  AZ brace                    []  Rocker Bottom      []  Night splint    []  Heel cups        []  Strap        []  Toe gizmos    []  Topl        []  NSAIDs         []  Broderick        []  Ref:         []  Stress Xray    []  CT        []  MRI  []  Inj:          []  Consider OR      []  Pick OR date    No follow-ups on file. No orders of the defined types were placed in this encounter. No orders of the defined types were placed in this encounter. Janae Verdin MD  Orthopedic Surgery        Please excuse any typos/errors, as this note was created with the assistance of voice recognition software. While intending to generate a document that actually reflects the content of the visit, the document can still have some errors including those of syntax and sound-a-like substitutions which may escape proof reading. In such instances, actual meaning can be extrapolated by context.

## 2022-02-24 NOTE — LETTER
[address] [place] [address] and Sports Medicine  [address] [place] [address]  Phone: [phone]  Fax: [phone]    Jerod Salcedo MD    February 28, 2022     Erickson Daley, APRN - CNP  [address] [place] [address]    Patient: Reather Fleischer   MR Number: [id number]   YOB: 1975   Date of Visit: 2/24/2022       Dear Erickson Daley: Thank you for referring Marv Dalton to me for evaluation/treatment. Below are the relevant portions of my assessment and plan of care. She has left greater than right foot pain, secondary to multiple issues. 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 On the right, she has plantar fasciitis and a gastroc equinus contracture (was previously diagnosed in March 2021 with a right calcaneal stress fracture). Her right heel pain has significantly improved. Notably, she has no relevant past medical history. We had a discussion today about the likely diagnosis and its natural history, physical exam and imaging findings, as well as various treatment options in detail. Surgically, we discussed a possible left foot cyst excision. We discussed the expected postoperative course, including the relevant weightbearing restrictions/immobilization. At today's visit, she states that she is not interested in surgical intervention at this time, but was more interested to hear her options. She has previously stated that she has no limitations in terms of walking, and only reports that the pain in her left foot is with direct compression.   At today's visit, she briefly met with my surgical scheduler, Jasmin Hopkins, and she will call us in the future if/when she wishes to proceed. Orders/referrals were placed as below at today's visit. For her left foot, we discussed a possible cyst aspiration, however she is not interested in this at today's visit. She may continue to use her over-the-counter flatfoot style orthotic, perform home exercises per physical therapy, and take oral NSAIDs as needed. For her right foot, she may continue to use her night splint, home exercises/physical therapy, and heel cups as needed. All questions were answered and the above plan was agreed upon. The patient will return to clinic in the future as needed, or possibly for surgical discussion (left foot excisional biopsy of cyst)            If you have questions, please do not hesitate to call me. I look forward to following Grady Goodwin along with you.     Sincerely,      Jerod Salcedo MD

## 2022-03-04 DIAGNOSIS — F32.A ANXIETY AND DEPRESSION: ICD-10-CM

## 2022-03-04 DIAGNOSIS — F41.9 ANXIETY AND DEPRESSION: ICD-10-CM

## 2022-03-04 RX ORDER — VENLAFAXINE HYDROCHLORIDE 150 MG/1
CAPSULE, EXTENDED RELEASE ORAL
Qty: 90 CAPSULE | Refills: 0 | OUTPATIENT
Start: 2022-03-04

## 2022-03-09 ENCOUNTER — OFFICE VISIT (OUTPATIENT)
Dept: FAMILY MEDICINE CLINIC | Age: 47
End: 2022-03-09
Payer: COMMERCIAL

## 2022-03-09 VITALS
OXYGEN SATURATION: 97 % | BODY MASS INDEX: 41.01 KG/M2 | DIASTOLIC BLOOD PRESSURE: 74 MMHG | HEART RATE: 92 BPM | SYSTOLIC BLOOD PRESSURE: 122 MMHG | TEMPERATURE: 97.4 F | WEIGHT: 210 LBS

## 2022-03-09 DIAGNOSIS — E03.9 ACQUIRED HYPOTHYROIDISM: ICD-10-CM

## 2022-03-09 DIAGNOSIS — R73.9 HYPERGLYCEMIA: ICD-10-CM

## 2022-03-09 DIAGNOSIS — F41.9 ANXIETY: Primary | ICD-10-CM

## 2022-03-09 PROBLEM — F32.A ANXIETY AND DEPRESSION: Status: RESOLVED | Noted: 2020-09-23 | Resolved: 2022-03-09

## 2022-03-09 LAB — HBA1C MFR BLD: 5.2 %

## 2022-03-09 PROCEDURE — G0444 DEPRESSION SCREEN ANNUAL: HCPCS | Performed by: NURSE PRACTITIONER

## 2022-03-09 PROCEDURE — 83036 HEMOGLOBIN GLYCOSYLATED A1C: CPT | Performed by: NURSE PRACTITIONER

## 2022-03-09 PROCEDURE — 99214 OFFICE O/P EST MOD 30 MIN: CPT | Performed by: NURSE PRACTITIONER

## 2022-03-09 RX ORDER — LEVOTHYROXINE SODIUM 0.05 MG/1
50 TABLET ORAL DAILY
Qty: 90 TABLET | Refills: 1 | Status: SHIPPED | OUTPATIENT
Start: 2022-03-09 | End: 2022-06-02

## 2022-03-09 RX ORDER — VENLAFAXINE HYDROCHLORIDE 150 MG/1
150 CAPSULE, EXTENDED RELEASE ORAL DAILY
Qty: 90 CAPSULE | Refills: 2 | Status: SHIPPED | OUTPATIENT
Start: 2022-03-09 | End: 2022-03-09 | Stop reason: SDUPTHER

## 2022-03-09 RX ORDER — VENLAFAXINE HYDROCHLORIDE 150 MG/1
150 CAPSULE, EXTENDED RELEASE ORAL DAILY
Qty: 90 CAPSULE | Refills: 3 | Status: SHIPPED | OUTPATIENT
Start: 2022-03-09

## 2022-03-09 ASSESSMENT — PATIENT HEALTH QUESTIONNAIRE - PHQ9
SUM OF ALL RESPONSES TO PHQ9 QUESTIONS 1 & 2: 0
SUM OF ALL RESPONSES TO PHQ QUESTIONS 1-9: 0
SUM OF ALL RESPONSES TO PHQ QUESTIONS 1-9: 0
2. FEELING DOWN, DEPRESSED OR HOPELESS: 0
SUM OF ALL RESPONSES TO PHQ QUESTIONS 1-9: 0
SUM OF ALL RESPONSES TO PHQ QUESTIONS 1-9: 0
1. LITTLE INTEREST OR PLEASURE IN DOING THINGS: 0

## 2022-03-09 NOTE — PROGRESS NOTES
[facility]  [address]  [address] [facility]. [employer] [address]  [phone]  [phone]    Temitope Childers is a 56 y.o. female who is here with c/o of:    Chief Complaint: Medication Refill      Patient Accompanied by: n/a    HPI - Temitope Childers is here today for the following: wellness visit (no new complaints) and review medications. (patient started crying upon me entering the room)      Anxiety              - Imaging supervisor at [employer]               - she does report feeling stressed d/t her job and feels this is the cause of her feelings at this time  *update*   - she is not crying as she was   - states she listens to everyone's problems, but does not speak of hers to others - \"I keep it to myself\"   - she is seeing counselor               - she denies feeling down and depressed, but not hopeless.  Denies sleep disturbance              - she denies thoughts of harming herself   - current med: Effexor XR 150mg daily      Patient Active Problem List   Diagnosis    Anxiety    Abnormal Pap smear    Fibroadenoma of left breast    Hyperglycemia    Acquired hypothyroidism     Past Medical History:   Diagnosis Date    Abnormal Pap smear 2005    Anxiety     GDM (gestational diabetes mellitus) 2007      Past Surgical History:   Procedure Laterality Date    CERVIX BIOPSY      COLPOSCOPY      INTRAUTERINE DEVICE INSERTION  05/2013    Mirena    INTRAUTERINE DEVICE INSERTION  07/10/2018    Mirena     INTRAUTERINE DEVICE REMOVAL  07/10/2018    SUPA  2005     Family History   Problem Relation Age of Onset    Heart Disease Maternal Aunt     Heart Disease Maternal Grandmother     Heart Disease Maternal Grandfather     Cancer Maternal Grandfather         prostate/lung    Other Paternal Grandfather         aneurysm    High Cholesterol Mother     Other Father         mass in his colon     Diabetes Father     Cancer Father         colon     Social History Tobacco Use    Smoking status: Former Smoker    Smokeless tobacco: Never Used   Substance Use Topics    Alcohol use: No     ALLERGIES:  No Known Allergies       Subjective     · Constitutional:  Negative for activity change, appetite change,unexpected weight change, chills, fever, and fatigue. · HENT: Negative for ear pain, sore throat,  Rhinorrhea, sinus pain, sinus pressure, congestion. · Eyes:  Negative for pain and discharge. · Respiratory:  Negative for chest tightness, shortness of breath, wheezing, and cough. · Cardiovascular:  Negative for chest pain, palpitations and leg swelling. · Gastrointestinal: Negative for abdominal pain, blood in stool, constipation,diarrhea, nausea and vomiting. · Endocrine: Negative for cold intolerance, heat intolerance, polydipsia, polyphagia and polyuria. · Genitourinary: Negative for difficulty urinating, dysuria, flank pain, frequency, hematuria and urgency. · Musculoskeletal: Negative for arthralgias, back pain, joint swelling, myalgias, neck pain and neck stiffness. · Skin: Negative for rash and wound. · Allergic/Immunologic: Negative for environmental allergies and food allergies. · Neurological:  Negative for dizziness, light-headedness, numbness and headaches. · Hematological:  Negative for adenopathy. Does not bruise/bleed easily. · Psychiatric/Behavioral: Negative for self-injury, sleep disturbance and suicidal ideas. Positive for anxiety    Objective     PHYSICAL EXAM:   · Constitutional: Josselyn Shah is oriented to person, place, and time. Vital signs are normal. Appears well-developed and well-nourished. · HEENT:   · Head: Normocephalic and atraumatic. Eyes:PERRL, EOMI, Conjunctiva normal, No discharge. · Neck: Full passive range of motion. · Cardiovascular: Normal rate, regular rhythm, S1, S2, no murmur, no gallop, no friction rub, intact distal pulses. No carotid bruit.  No lower extremity edema  · Pulmonary/Chest: Breath sounds are clear throughout, No respiratory distress, No wheezing, No chest tenderness. Effort normal  · Musculoskeletal: Extremities appear regular and symmetric. No evident masses, lesions, foreign bodies, or other abnormalities. No edema. No tenderness on palpation. Joints are stable. Full ROM, strength and tone are within normal limits. · Neurological: Alert and oriented to person, place, and time. Normal motor function, Normal sensory function, No focal deficits noted. He has normal strength. · Skin: Skin is warm, dry and intact. No obvious lesions on exposed skin  · Psychiatric: Normal mood and affect. Speech is normal and behavior is normal. Patient cried intermittently during the visit - states she doesn't know why she's crying and is not because she is depressed - just cries when she talks about herself    Nursing note and vitals reviewed. Blood pressure 122/74, pulse 92, temperature 97.4 °F (36.3 °C), temperature source Temporal, weight 210 lb (95.3 kg), SpO2 97 %, not currently breastfeeding. Body mass index is 41.01 kg/m².     Wt Readings from Last 3 Encounters:   03/09/22 210 lb (95.3 kg)   02/24/22 205 lb (93 kg)   02/01/22 205 lb (93 kg)     BP Readings from Last 3 Encounters:   03/09/22 122/74   12/28/21 122/82   06/10/21 130/78       Results for POC orders placed in visit on 03/09/22   POCT glycosylated hemoglobin (Hb A1C)   Result Value Ref Range    Hemoglobin A1C 5.2 %       Completed Orders/Prescriptions   Orders Placed This Encounter   Medications    DISCONTD: venlafaxine (EFFEXOR XR) 150 MG extended release capsule     Sig: Take 1 capsule by mouth daily     Dispense:  90 capsule     Refill:  2    levothyroxine (SYNTHROID) 50 MCG tablet     Sig: Take 1 tablet by mouth Daily     Dispense:  90 tablet     Refill:  1    venlafaxine (EFFEXOR XR) 150 MG extended release capsule     Sig: Take 1 capsule by mouth daily     Dispense:  90 capsule     Refill:  3

## 2022-06-01 DIAGNOSIS — E03.9 ACQUIRED HYPOTHYROIDISM: ICD-10-CM

## 2022-06-02 ENCOUNTER — OFFICE VISIT (OUTPATIENT)
Dept: PODIATRY | Age: 47
End: 2022-06-02
Payer: COMMERCIAL

## 2022-06-02 VITALS — WEIGHT: 210 LBS | HEIGHT: 60 IN | BODY MASS INDEX: 41.23 KG/M2

## 2022-06-02 DIAGNOSIS — M79.672 LEFT FOOT PAIN: Primary | ICD-10-CM

## 2022-06-02 DIAGNOSIS — M79.671 INTRACTABLE RIGHT HEEL PAIN: ICD-10-CM

## 2022-06-02 DIAGNOSIS — M72.2 PLANTAR FASCIITIS, LEFT: ICD-10-CM

## 2022-06-02 PROCEDURE — 99213 OFFICE O/P EST LOW 20 MIN: CPT | Performed by: PODIATRIST

## 2022-06-02 PROCEDURE — 20550 NJX 1 TENDON SHEATH/LIGAMENT: CPT | Performed by: PODIATRIST

## 2022-06-02 RX ORDER — LEVOTHYROXINE SODIUM 0.05 MG/1
TABLET ORAL
Qty: 90 TABLET | Refills: 0 | Status: SHIPPED | OUTPATIENT
Start: 2022-06-02

## 2022-06-02 NOTE — PROGRESS NOTES
[address] [facility] PODIATRY  [address] [facility] [address]  Dept: [phone]    RETURN PATIENT PROGRESS NOTE  Date of patient's visit: 6/2/2022  Patient's Name:  Ashanti Ayoub YOB: 1975            Patient Care Team:  JADE Barton CNP as PCP - General (Nurse Practitioner)  JADE Barton CNP as PCP - [facility] Empaneled Provider       Kamijuju Gutiérrez 56 y.o. female that presents for follow-up of   Chief Complaint   Patient presents with    Foot Pain     left foot pain    Second Opinion     Pt's primary care physician is JADE Barton CNP last seen march 9 2022  Symptoms began 6 month(s) ago and are increased to left heel and cyst to left foot. Patient relates pain is Present. Pain is rated 2 out of 10 and is described as intermittent. Treatments prior to today's visit include: treatment by dr Rhea rosenberg,mri. Currently denies F/C/N/V. Patient states she was told she needs a surgical procedure done and she would like a second opinion. No Known Allergies    Past Medical History:   Diagnosis Date    Abnormal Pap smear 2005    Anxiety     GDM (gestational diabetes mellitus) 2007       Prior to Admission medications    Medication Sig Start Date End Date Taking?  Authorizing Provider   levothyroxine (SYNTHROID) 50 MCG tablet Take 1 tablet by mouth Daily 3/9/22  Yes JADE Barton CNP   venlafaxine (EFFEXOR XR) 150 MG extended release capsule Take 1 capsule by mouth daily 3/9/22  Yes JADE Roth CNP   naproxen (EC NAPROSYN) 500 MG EC tablet Take 1 tablet by mouth 2 times daily as needed for Pain 11/8/21  Yes Mónica Meek MD       Review of Systems    Review of Systems:  History obtained from chart review and the patient  General ROS: negative for - chills, fatigue, fever, night sweats or weight gain  Constitutional: Negative for chills, diaphoresis, fatigue, fever and unexpected weight change. Musculoskeletal: Positive for arthralgias, gait problem and joint swelling. Neurological ROS: negative for - behavioral changes, confusion, headaches or seizures. Negative for weakness and numbness. Dermatological ROS: negative for - mole changes, rash  Cardiovascular: Negative for leg swelling. Gastrointestinal: Negative for constipation, diarrhea, nausea and vomiting. Lower Extremity Physical Examination:     Vitals: There were no vitals filed for this visit. General: AAO x 3 in NAD. Dermatologic Exam:  Skin lesion/ulceration Absent . Skin No rashes or nodules noted. .       Musculoskeletal:     1st MPJ ROM decreased, Bilateral. +POP cyst noted to dorsal lateral left foot  Muscle strength 5/5, Bilateral. POP of the right and left plantar medial calcaneal tuberosity. Pain increased with Dorsiflexion of the right and left lesser toes. Negative pain on compression of the calcaneus bilaterally Medial longitudinal arch, Bilateral WNL. Ankle ROM WNL,Bilateral.    Dorsally contracted digits absent digits 1-5 Bilateral.     Vascular: DP and PT pulses palpable 2/4, Bilateral.  CFT <3 seconds, Bilateral.  Hair growth present to the level of the digits, Bilateral.  Edema absent, Bilateral.  Varicosities absent, Bilateral. Erythema absent, Bilateral    Neurological: Sensation intact to light touch to level of digits, Bilateral.  Protective sensation intact 10/10 sites via 5.07/10g Washburn-Ramandeep Monofilament, Bilateral.  negative Tinel's, Bilateral.  negative Valleix sign, Bilateral.      Integument: Warm, dry, supple, Bilateral.  Open lesion absent, Bilateral.  Interdigital maceration absent to web spaces 1-4, Bilateral.  Nails are normal in length, thickness and color 1-5 bilateral.  Fissures absent, Bilateral.           Asessment: Patient is a 56 y.o. female with:      Diagnosis Orders   1.  Left foot pain  [address] - [place] INJECT TENDON SHEATH/LIGAMENT    betamethasone acetate-betamethasone sodium phosphate (CELESTONE SOLUSPAN) 6 (3-3) MG/ML injection   2. Intractable right heel pain  12905 - [place] INJECT TENDON SHEATH/LIGAMENT    betamethasone acetate-betamethasone sodium phosphate (CELESTONE SOLUSPAN) 6 (3-3) MG/ML injection   3. Plantar fasciitis, left  [address]25 - [place] INJECT TENDON SHEATH/LIGAMENT    betamethasone acetate-betamethasone sodium phosphate (CELESTONE SOLUSPAN) 6 (3-3) MG/ML injection       Plan: Patient examined and evaluated. Current condition and treatment options discussed in detail. A steroid injection was performed at left plantar fascia and left foot cyst using 1% plain Lidocaine and 6 mg of Celestone. This was well tolerated. Patient Instructions: Plantar Fasciitis    Plantar Fasciitis is inflammation of the long fibrous band on the bottom of the foot (plantar fascia), especially in the area of its attachment to the heel bone (calcaneus). The plantar fascia is also intimately related to the Achilles tendon and therefore stretching of the calf muscles is also important for treatment. 1. Stretching: Perform 3-4 times daily, 2-3 minutes per side      -Before getting out of bed, place a towel around the ball of your foot and       pull back, holding for 30 seconds. Repeat with other foot.      -Place a tennis ball on the floor. Roll the tennis ball under your foot for      10-15 minutes. Repeat with other foot. -Perform calf stretches: stand facing a wall with your hands on the wall,      place one leg a step behind the other. Keeping your back heel on the      floor, bend your front knee (lean into the wall), holding for 30 seconds. Repeat with other leg. 2. Icing: Perform 2-3 times daily      -Place a 12 oz plastic water bottle in the freezer. Once frozen, remove     and roll the bottle under your foot for 10-15 min.          3. Anti-inflammatory Medication      -Begin daily regimen of anti-inflammatory medication (Ibuprofen,       Naproxen, Naprosyn) as discussed during your visit. Verbal and written instructions given to patient. Contact office with any questions/problems/concerns. No orders of the defined types were placed in this encounter. No orders of the defined types were placed in this encounter. RTC in 1month(s).     6/2/2022      Electronically signed by Juan Oquendo DPM on 6/2/2022 at 10:04 AM  6/2/2022

## 2022-06-02 NOTE — TELEPHONE ENCOUNTER
Merry Baker is calling to request a refill on the following medication(s):    Medication Request:  Requested Prescriptions     Pending Prescriptions Disp Refills    levothyroxine (SYNTHROID) 50 MCG tablet [Pharmacy Med Name: LEVOTHYROXINE SODIUM 50MCG TABS] 90 tablet 2     Sig: TAKE 1 TABLET BY MOUTH ONE TIME A DAY       Last Visit Date (If Applicable):  9/8/5055 in office    Next Visit Date:    Visit date not found

## 2022-06-07 RX ORDER — BETAMETHASONE SODIUM PHOSPHATE AND BETAMETHASONE ACETATE 3; 3 MG/ML; MG/ML
9 INJECTION, SUSPENSION INTRA-ARTICULAR; INTRALESIONAL; INTRAMUSCULAR; SOFT TISSUE ONCE
Qty: 1.5 ML | Refills: 0
Start: 2022-06-07 | End: 2022-06-10

## 2022-06-10 RX ORDER — BETAMETHASONE SODIUM PHOSPHATE AND BETAMETHASONE ACETATE 3; 3 MG/ML; MG/ML
6 INJECTION, SUSPENSION INTRA-ARTICULAR; INTRALESIONAL; INTRAMUSCULAR; SOFT TISSUE ONCE
Status: COMPLETED | OUTPATIENT
Start: 2022-06-10 | End: 2022-06-10

## 2022-06-10 RX ADMIN — BETAMETHASONE SODIUM PHOSPHATE AND BETAMETHASONE ACETATE 6 MG: 3; 3 INJECTION, SUSPENSION INTRA-ARTICULAR; INTRALESIONAL; INTRAMUSCULAR; SOFT TISSUE at 10:13

## 2022-09-07 LAB
CHOLESTEROL/HDL RATIO: 4.7
CHOLESTEROL: 202 MG/DL
GLUCOSE BLD-MCNC: 113 MG/DL (ref 70–99)
HDLC SERPL-MCNC: 43 MG/DL
LDL CHOLESTEROL: 129 MG/DL (ref 0–130)
PATIENT FASTING?: YES
TRIGL SERPL-MCNC: 149 MG/DL

## 2022-09-22 ENCOUNTER — OFFICE VISIT (OUTPATIENT)
Dept: FAMILY MEDICINE CLINIC | Age: 47
End: 2022-09-22
Payer: COMMERCIAL

## 2022-09-22 VITALS
DIASTOLIC BLOOD PRESSURE: 76 MMHG | OXYGEN SATURATION: 94 % | HEIGHT: 60 IN | HEART RATE: 80 BPM | SYSTOLIC BLOOD PRESSURE: 122 MMHG | WEIGHT: 209.6 LBS | TEMPERATURE: 97.5 F | RESPIRATION RATE: 16 BRPM | BODY MASS INDEX: 41.15 KG/M2

## 2022-09-22 DIAGNOSIS — H65.92 FLUID LEVEL BEHIND TYMPANIC MEMBRANE OF LEFT EAR: Primary | ICD-10-CM

## 2022-09-22 PROCEDURE — 99213 OFFICE O/P EST LOW 20 MIN: CPT | Performed by: NURSE PRACTITIONER

## 2022-09-22 RX ORDER — FLUTICASONE PROPIONATE 50 MCG
2 SPRAY, SUSPENSION (ML) NASAL DAILY
Qty: 16 G | Refills: 0 | COMMUNITY
Start: 2022-09-22

## 2022-09-22 SDOH — ECONOMIC STABILITY: TRANSPORTATION INSECURITY
IN THE PAST 12 MONTHS, HAS LACK OF TRANSPORTATION KEPT YOU FROM MEETINGS, WORK, OR FROM GETTING THINGS NEEDED FOR DAILY LIVING?: NO

## 2022-09-22 SDOH — ECONOMIC STABILITY: FOOD INSECURITY: WITHIN THE PAST 12 MONTHS, YOU WORRIED THAT YOUR FOOD WOULD RUN OUT BEFORE YOU GOT MONEY TO BUY MORE.: NEVER TRUE

## 2022-09-22 SDOH — ECONOMIC STABILITY: FOOD INSECURITY: WITHIN THE PAST 12 MONTHS, THE FOOD YOU BOUGHT JUST DIDN'T LAST AND YOU DIDN'T HAVE MONEY TO GET MORE.: NEVER TRUE

## 2022-09-22 SDOH — ECONOMIC STABILITY: TRANSPORTATION INSECURITY
IN THE PAST 12 MONTHS, HAS THE LACK OF TRANSPORTATION KEPT YOU FROM MEDICAL APPOINTMENTS OR FROM GETTING MEDICATIONS?: NO

## 2022-09-22 ASSESSMENT — PATIENT HEALTH QUESTIONNAIRE - PHQ9
10. IF YOU CHECKED OFF ANY PROBLEMS, HOW DIFFICULT HAVE THESE PROBLEMS MADE IT FOR YOU TO DO YOUR WORK, TAKE CARE OF THINGS AT HOME, OR GET ALONG WITH OTHER PEOPLE: 0
5. POOR APPETITE OR OVEREATING: 0
SUM OF ALL RESPONSES TO PHQ QUESTIONS 1-9: 0
6. FEELING BAD ABOUT YOURSELF - OR THAT YOU ARE A FAILURE OR HAVE LET YOURSELF OR YOUR FAMILY DOWN: 0
SUM OF ALL RESPONSES TO PHQ QUESTIONS 1-9: 0
2. FEELING DOWN, DEPRESSED OR HOPELESS: 0
SUM OF ALL RESPONSES TO PHQ9 QUESTIONS 1 & 2: 0
7. TROUBLE CONCENTRATING ON THINGS, SUCH AS READING THE NEWSPAPER OR WATCHING TELEVISION: 0
1. LITTLE INTEREST OR PLEASURE IN DOING THINGS: 0
3. TROUBLE FALLING OR STAYING ASLEEP: 0
8. MOVING OR SPEAKING SO SLOWLY THAT OTHER PEOPLE COULD HAVE NOTICED. OR THE OPPOSITE, BEING SO FIGETY OR RESTLESS THAT YOU HAVE BEEN MOVING AROUND A LOT MORE THAN USUAL: 0
4. FEELING TIRED OR HAVING LITTLE ENERGY: 0
SUM OF ALL RESPONSES TO PHQ QUESTIONS 1-9: 0
SUM OF ALL RESPONSES TO PHQ QUESTIONS 1-9: 0
9. THOUGHTS THAT YOU WOULD BE BETTER OFF DEAD, OR OF HURTING YOURSELF: 0

## 2022-09-22 ASSESSMENT — SOCIAL DETERMINANTS OF HEALTH (SDOH): HOW HARD IS IT FOR YOU TO PAY FOR THE VERY BASICS LIKE FOOD, HOUSING, MEDICAL CARE, AND HEATING?: NOT HARD AT ALL

## 2022-09-22 ASSESSMENT — ENCOUNTER SYMPTOMS
EYE PAIN: 0
NAUSEA: 0
CHEST TIGHTNESS: 0
SORE THROAT: 0
RHINORRHEA: 0
COUGH: 0
VOMITING: 0
SHORTNESS OF BREATH: 0

## 2022-09-22 NOTE — PROGRESS NOTES
Visit Medications    Medication Sig Taking? Authorizing Provider   fluticasone (FLONASE) 50 MCG/ACT nasal spray 2 sprays by Nasal route daily Yes JADE Regan CNP   levothyroxine (SYNTHROID) 50 MCG tablet TAKE 1 TABLET BY MOUTH ONE TIME A DAY Yes Regine PerezJADE hercules CNP   venlafaxine (EFFEXOR XR) 150 MG extended release capsule Take 1 capsule by mouth daily Yes JADE Jorge CNP   naproxen (EC NAPROSYN) 500 MG EC tablet Take 1 tablet by mouth 2 times daily as needed for Pain Yes Digna Francisco MD       No Known Allergies      Subjective:      Review of Systems   Constitutional:  Negative for chills and fever. HENT:  Positive for hearing loss (muffled hearing). Negative for congestion, ear discharge, ear pain, rhinorrhea and sore throat. Left ear feels full   Eyes:  Negative for pain and visual disturbance. Respiratory:  Negative for cough, chest tightness and shortness of breath. Cardiovascular:  Negative for chest pain, palpitations and leg swelling. Gastrointestinal:  Negative for nausea and vomiting. Genitourinary:  Negative for decreased urine volume and difficulty urinating. Musculoskeletal:  Negative for gait problem, myalgias and neck pain. Skin:  Negative for pallor and rash. Neurological:  Negative for weakness, light-headedness and headaches. Psychiatric/Behavioral:  Negative for sleep disturbance. Objective:     Physical Exam  Vitals and nursing note reviewed. Constitutional:       General: She is not in acute distress. Appearance: Normal appearance. HENT:      Head: Normocephalic and atraumatic. Right Ear: Tympanic membrane and ear canal normal.      Left Ear: Ear canal normal. No drainage or swelling. A middle ear effusion is present. Tympanic membrane is not erythematous. Nose: Nose normal.      Mouth/Throat:      Lips: Pink. Mouth: Mucous membranes are moist.      Pharynx: Oropharynx is clear. Uvula midline.    Eyes: Extraocular Movements: Extraocular movements intact. Conjunctiva/sclera: Conjunctivae normal.   Cardiovascular:      Rate and Rhythm: Normal rate and regular rhythm. Pulses: Normal pulses. Pulmonary:      Effort: Pulmonary effort is normal.      Breath sounds: Normal breath sounds. Abdominal:      General: Bowel sounds are normal.      Palpations: Abdomen is soft. Musculoskeletal:         General: Normal range of motion. Cervical back: Normal range of motion and neck supple. Skin:     General: Skin is warm and dry. Capillary Refill: Capillary refill takes less than 2 seconds. Coloration: Skin is not pale. Neurological:      Mental Status: She is alert and oriented to person, place, and time. Coordination: Coordination normal.      Gait: Gait normal.   Psychiatric:         Mood and Affect: Mood normal.         Thought Content: Thought content normal.         MEDICAL DECISION MAKING Assessment/Plan:     Lee Ann Beatty was seen today for otalgia. Diagnoses and all orders for this visit:    Fluid level behind tympanic membrane of left ear  -     fluticasone (FLONASE) 50 MCG/ACT nasal spray; 2 sprays by Nasal route daily      Results for orders placed or performed in visit on 09/07/22   Be Well Health Screen   Result Value Ref Range    Glucose 113 (H) 70 - 99 mg/dL    Cholesterol 202 (H) <200 mg/dL    HDL 43 >40 mg/dL    LDL Cholesterol 129 0 - 130 mg/dL    Chol/HDL Ratio 4.7 <5    Triglycerides 149 <150 mg/dL    Patient Fasting? YES      Based on the physical exam findings-- I will treat the symptoms as an Otitis Media with Effusion. Flonase daily recommended. May continue on Mucinex DM as you were prior to coming in today. Follow up if symptoms do not improve/worsen. Go to the ER for any emergent concern. Patient given educational materials - see patientinstructions. Discussed use, benefit, and side effects of prescribed medications. All patient questions answered.  Pt verbalized understanding. Instructed to continue current medications, diet and exercise. Patient agreed with treatment plan. Follow up as directed.      Electronically signed by JADE Hylton CNP on 9/22/2022 at 8:31 AM

## 2022-11-09 ENCOUNTER — OFFICE VISIT (OUTPATIENT)
Dept: PRIMARY CARE CLINIC | Age: 47
End: 2022-11-09
Payer: COMMERCIAL

## 2022-11-09 VITALS
OXYGEN SATURATION: 99 % | WEIGHT: 210 LBS | BODY MASS INDEX: 41.01 KG/M2 | HEART RATE: 90 BPM | SYSTOLIC BLOOD PRESSURE: 124 MMHG | RESPIRATION RATE: 16 BRPM | DIASTOLIC BLOOD PRESSURE: 88 MMHG

## 2022-11-09 DIAGNOSIS — Z12.31 ENCOUNTER FOR SCREENING MAMMOGRAM FOR BREAST CANCER: ICD-10-CM

## 2022-11-09 DIAGNOSIS — F41.9 ANXIETY: ICD-10-CM

## 2022-11-09 DIAGNOSIS — F32.A MILD DEPRESSION: ICD-10-CM

## 2022-11-09 DIAGNOSIS — E03.9 ACQUIRED HYPOTHYROIDISM: ICD-10-CM

## 2022-11-09 DIAGNOSIS — Z76.89 ENCOUNTER TO ESTABLISH CARE: Primary | ICD-10-CM

## 2022-11-09 DIAGNOSIS — R73.01 IMPAIRED FASTING GLUCOSE: ICD-10-CM

## 2022-11-09 PROCEDURE — 99214 OFFICE O/P EST MOD 30 MIN: CPT | Performed by: NURSE PRACTITIONER

## 2022-11-09 RX ORDER — VENLAFAXINE HYDROCHLORIDE 150 MG/1
150 CAPSULE, EXTENDED RELEASE ORAL DAILY
Qty: 90 CAPSULE | Refills: 3 | Status: SHIPPED | OUTPATIENT
Start: 2022-11-09

## 2022-11-09 RX ORDER — LEVOTHYROXINE SODIUM 0.05 MG/1
TABLET ORAL
Qty: 90 TABLET | Refills: 0 | Status: SHIPPED | OUTPATIENT
Start: 2022-11-09

## 2022-11-09 RX ORDER — BUPROPION HYDROCHLORIDE 150 MG/1
150 TABLET ORAL EVERY MORNING
Qty: 30 TABLET | Refills: 5 | Status: SHIPPED | OUTPATIENT
Start: 2022-11-09

## 2022-11-09 ASSESSMENT — PATIENT HEALTH QUESTIONNAIRE - PHQ9
4. FEELING TIRED OR HAVING LITTLE ENERGY: 0
9. THOUGHTS THAT YOU WOULD BE BETTER OFF DEAD, OR OF HURTING YOURSELF: 0
5. POOR APPETITE OR OVEREATING: 0
2. FEELING DOWN, DEPRESSED OR HOPELESS: 0
10. IF YOU CHECKED OFF ANY PROBLEMS, HOW DIFFICULT HAVE THESE PROBLEMS MADE IT FOR YOU TO DO YOUR WORK, TAKE CARE OF THINGS AT HOME, OR GET ALONG WITH OTHER PEOPLE: 0
8. MOVING OR SPEAKING SO SLOWLY THAT OTHER PEOPLE COULD HAVE NOTICED. OR THE OPPOSITE, BEING SO FIGETY OR RESTLESS THAT YOU HAVE BEEN MOVING AROUND A LOT MORE THAN USUAL: 0
SUM OF ALL RESPONSES TO PHQ9 QUESTIONS 1 & 2: 0
SUM OF ALL RESPONSES TO PHQ QUESTIONS 1-9: 0
1. LITTLE INTEREST OR PLEASURE IN DOING THINGS: 0
SUM OF ALL RESPONSES TO PHQ QUESTIONS 1-9: 0
3. TROUBLE FALLING OR STAYING ASLEEP: 0
6. FEELING BAD ABOUT YOURSELF - OR THAT YOU ARE A FAILURE OR HAVE LET YOURSELF OR YOUR FAMILY DOWN: 0
7. TROUBLE CONCENTRATING ON THINGS, SUCH AS READING THE NEWSPAPER OR WATCHING TELEVISION: 0
SUM OF ALL RESPONSES TO PHQ QUESTIONS 1-9: 0
SUM OF ALL RESPONSES TO PHQ QUESTIONS 1-9: 0

## 2022-11-09 ASSESSMENT — ENCOUNTER SYMPTOMS
CHEST TIGHTNESS: 0
NAUSEA: 0
SINUS PRESSURE: 0
SORE THROAT: 0
EYE REDNESS: 0
ABDOMINAL PAIN: 0
EYE ITCHING: 0
VOMITING: 0
SHORTNESS OF BREATH: 0
SINUS PAIN: 0
DIARRHEA: 0
WHEEZING: 0
COUGH: 0
TROUBLE SWALLOWING: 0
EYE DISCHARGE: 0

## 2022-11-09 NOTE — PROGRESS NOTES
[facility] PRIMARY CARE  [address]  [address] [address]  Dept: [phone]  Dept Fax: [phone]    Nathaniel Angeles is a 56 y.o. female who presents today for her medical conditions/complaintsas noted below. Nathaniel Angeles is c/o of Establish Care (Go over be well labs)        HPI:     Pt presents to establish care. Bp stable  Weight stable    Pt presents to establish care. She works at [employer] at [employer] with [employer]. 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 She is under a lot of stress at her job. She has a hard time managing her feelings. She has to listen to a lot of complaints which she empathizes with them. This causes a lot of anxiety for her. She had postpartum depression  Started with lexapro, celexa  She has met with travis in the past. She has worked well. Currently on effexor. This seems to be working well for her but she is not sure. She continues to have crying spells. Over due for mammogram  Colonoscopy in 2017    Needs to review recent be well labs.        Past Medical History:   Diagnosis Date    Abnormal Pap smear 01/01/2005    Anxiety     GDM (gestational diabetes mellitus) 01/01/2007    Hypothyroidism       Past Surgical History:   Procedure Laterality Date    CERVIX BIOPSY      COLPOSCOPY      INTRAUTERINE DEVICE INSERTION  05/2013    Mirena    INTRAUTERINE DEVICE INSERTION  07/10/2018    Mirena     INTRAUTERINE DEVICE REMOVAL  07/10/2018    SUPA  2005       Family History   Problem Relation Age of Onset    Heart Disease Maternal Aunt     Coronary Art Dis Maternal Aunt     Obesity Maternal Aunt     Heart Disease Maternal Grandmother     Heart Disease Maternal Grandfather     Cancer Maternal Grandfather         prostate/lung    Other Paternal Grandfather         aneurysm    Stroke Paternal Grandfather     High Cholesterol Mother     Miscarriages / [place] Mother     Other Father         mass in his colon     Diabetes Father Cancer Father         colon       Social History     Tobacco Use    Smoking status: Former     Packs/day: 0.50     Years: 2.00     Pack years: 1.00     Types: Cigarettes     Start date: 2000     Quit date: 2002     Years since quittin.4    Smokeless tobacco: Never   Substance Use Topics    Alcohol use: No      Current Outpatient Medications   Medication Sig Dispense Refill    venlafaxine (EFFEXOR XR) 150 MG extended release capsule Take 1 capsule by mouth daily 90 capsule 3    levothyroxine (SYNTHROID) 50 MCG tablet TAKE 1 TABLET BY MOUTH ONE TIME A DAY 90 tablet 0    buPROPion (WELLBUTRIN XL) 150 MG extended release tablet Take 1 tablet by mouth every morning 30 tablet 5    fluticasone (FLONASE) 50 MCG/ACT nasal spray 2 sprays by Nasal route daily 16 g 0    naproxen (EC NAPROSYN) 500 MG EC tablet Take 1 tablet by mouth 2 times daily as needed for Pain 60 tablet 0     No current facility-administered medications for this visit. No Known Allergies    Health Maintenance   Topic Date Due    COVID-19 Vaccine (3 - Booster for Moderna series) 2021    Flu vaccine (1) 2022    Breast cancer screen  2022    Depression Monitoring  2023    Cervical cancer screen  2023    Diabetes screen  2025    DTaP/Tdap/Td vaccine (2 - Td or Tdap) 2027    Lipids  2027    Colorectal Cancer Screen  10/13/2027    Hepatitis A vaccine  Aged Out    Hib vaccine  Aged Out    Meningococcal (ACWY) vaccine  Aged Out    Pneumococcal 0-64 years Vaccine  Aged Out    Hepatitis C screen  Discontinued    HIV screen  Discontinued       :     Review of Systems   Constitutional:  Negative for chills, fatigue and fever. HENT:  Negative for ear discharge, ear pain, sinus pressure, sinus pain, sore throat and trouble swallowing. Eyes:  Negative for discharge, redness and itching. Respiratory:  Negative for cough, chest tightness, shortness of breath and wheezing.     Cardiovascular:  Negative for chest pain. Gastrointestinal:  Negative for abdominal pain, diarrhea, nausea and vomiting. Genitourinary:  Negative for difficulty urinating. Musculoskeletal:  Negative for arthralgias and neck pain. Skin:  Negative for rash. Neurological:  Negative for dizziness, weakness, light-headedness and headaches. Psychiatric/Behavioral:  The patient is nervous/anxious. All other systems reviewed and are negative. Objective:     Physical Exam  Constitutional:       Appearance: Normal appearance. She is normal weight. HENT:      Head: Normocephalic and atraumatic. Nose: Nose normal.   Eyes:      Extraocular Movements: Extraocular movements intact. Conjunctiva/sclera: Conjunctivae normal.      Pupils: Pupils are equal, round, and reactive to light. Cardiovascular:      Rate and Rhythm: Normal rate and regular rhythm. Pulses: Normal pulses. Heart sounds: Normal heart sounds. Pulmonary:      Effort: Pulmonary effort is normal.      Breath sounds: Normal breath sounds. Abdominal:      General: Abdomen is flat. Palpations: Abdomen is soft. Musculoskeletal:         General: Normal range of motion. Cervical back: Neck supple. Skin:     General: Skin is warm and dry. Capillary Refill: Capillary refill takes less than 2 seconds. Neurological:      General: No focal deficit present. Mental Status: She is alert and oriented to person, place, and time. Psychiatric:         Mood and Affect: Mood normal. Affect is tearful. /88   Pulse 90   Resp 16   Wt 210 lb (95.3 kg)   SpO2 99%   BMI 41.01 kg/m²     Assessment:       Diagnosis Orders   1. Encounter to establish care        2. Anxiety  venlafaxine (EFFEXOR XR) 150 MG extended release capsule      3. Mild depression        4. Acquired hypothyroidism  levothyroxine (SYNTHROID) 50 MCG tablet    TSH with Reflex      5.  Encounter for screening mammogram for breast cancer  HAIDER DIGITAL SCREEN W OR WO CAD BILATERAL      6. Impaired fasting glucose  Hemoglobin A1C          :      Return in about 1 month (around 12/9/2022) for medication recheck . 1.  Encounter establish care  Reviewed previous records and lab work  2-3. Anxiety and depression  Encourage patient to follow-up with Gisselle Sagastume for counseling  We will continue with Effexor 150 mg daily  Add on Wellbutrin 150 mg daily  4. Hypothyroidism  Rx for lab work  Continue with levothyroxine 50 mcg a day  5. Screening for breast cancer  Rx for mammogram  6. Impaired fasting glucose  Add on A1c    Reviewed patient's be well labs with her. We did notice that her glucose was elevated. We will add on A1c to rule out diabetes    Patient is going to follow-up in 1 month for reevaluation and medication  Orders Placed This Encounter   Procedures    HAIDER DIGITAL SCREEN W OR WO CAD BILATERAL     Standing Status:   Future     Standing Expiration Date:   11/9/2023     Order Specific Question:   Reason for exam:     Answer:   screening    TSH with Reflex     Standing Status:   Future     Standing Expiration Date:   2/9/2023    Hemoglobin A1C     Standing Status:   Future     Standing Expiration Date:   11/9/2023     Orders Placed This Encounter   Medications    venlafaxine (EFFEXOR XR) 150 MG extended release capsule     Sig: Take 1 capsule by mouth daily     Dispense:  90 capsule     Refill:  3    levothyroxine (SYNTHROID) 50 MCG tablet     Sig: TAKE 1 TABLET BY MOUTH ONE TIME A DAY     Dispense:  90 tablet     Refill:  0    buPROPion (WELLBUTRIN XL) 150 MG extended release tablet     Sig: Take 1 tablet by mouth every morning     Dispense:  30 tablet     Refill:  5       Patient given educational materials - seepatient instructions. Discussed use, benefit, and side effects of prescribed medications. All patient questions answered. Pt voiced understanding. Reviewed health maintenance. Instructed to continue current medications, diet and exercise.   Patient agreedwith treatment plan. Follow up as directed.       Electronically signed by JADE Allen CNP on 11/9/2022at 10:03 AM

## 2022-11-17 ENCOUNTER — HOSPITAL ENCOUNTER (OUTPATIENT)
Age: 47
Discharge: HOME | End: 2022-11-17
Payer: COMMERCIAL

## 2022-11-17 DIAGNOSIS — R73.01 IMPAIRED FASTING GLUCOSE: ICD-10-CM

## 2022-11-17 DIAGNOSIS — E03.9 ACQUIRED HYPOTHYROIDISM: ICD-10-CM

## 2022-11-17 LAB
ESTIMATED AVERAGE GLUCOSE: 105 MG/DL
HBA1C MFR BLD: 5.3 % (ref 4–6)
TSH SERPL DL<=0.05 MIU/L-ACNC: 0.67 UIU/ML (ref 0.3–5)

## 2022-11-17 PROCEDURE — 83036 HEMOGLOBIN GLYCOSYLATED A1C: CPT

## 2022-11-17 PROCEDURE — 84443 ASSAY THYROID STIM HORMONE: CPT

## 2022-11-17 PROCEDURE — 36415 COLL VENOUS BLD VENIPUNCTURE: CPT

## 2022-12-12 ENCOUNTER — TELEMEDICINE (OUTPATIENT)
Dept: PRIMARY CARE CLINIC | Age: 47
End: 2022-12-12
Payer: COMMERCIAL

## 2022-12-12 DIAGNOSIS — F41.9 ANXIETY: Primary | ICD-10-CM

## 2022-12-12 DIAGNOSIS — E03.9 ACQUIRED HYPOTHYROIDISM: ICD-10-CM

## 2022-12-12 DIAGNOSIS — F32.A MILD DEPRESSION: ICD-10-CM

## 2022-12-12 PROCEDURE — 99214 OFFICE O/P EST MOD 30 MIN: CPT | Performed by: NURSE PRACTITIONER

## 2022-12-12 RX ORDER — BUPROPION HYDROCHLORIDE 300 MG/1
300 TABLET ORAL EVERY MORNING
Qty: 90 TABLET | Refills: 0 | Status: SHIPPED | OUTPATIENT
Start: 2022-12-12

## 2022-12-12 RX ORDER — VENLAFAXINE HYDROCHLORIDE 75 MG/1
75 CAPSULE, EXTENDED RELEASE ORAL DAILY
Qty: 90 CAPSULE | Refills: 0 | Status: SHIPPED | OUTPATIENT
Start: 2022-12-12

## 2022-12-12 RX ORDER — LEVOTHYROXINE SODIUM 0.05 MG/1
TABLET ORAL
Qty: 90 TABLET | Refills: 1 | Status: SHIPPED | OUTPATIENT
Start: 2022-12-12

## 2022-12-12 ASSESSMENT — ENCOUNTER SYMPTOMS
TROUBLE SWALLOWING: 0
SINUS PRESSURE: 0
SORE THROAT: 0
COUGH: 0
SINUS PAIN: 0
SHORTNESS OF BREATH: 0
CHEST TIGHTNESS: 0
WHEEZING: 0
VOMITING: 0
NAUSEA: 0
DIARRHEA: 0
EYE REDNESS: 0
ABDOMINAL PAIN: 0
EYE DISCHARGE: 0
EYE ITCHING: 0

## 2022-12-12 ASSESSMENT — PATIENT HEALTH QUESTIONNAIRE - PHQ9
7. TROUBLE CONCENTRATING ON THINGS, SUCH AS READING THE NEWSPAPER OR WATCHING TELEVISION: 0
10. IF YOU CHECKED OFF ANY PROBLEMS, HOW DIFFICULT HAVE THESE PROBLEMS MADE IT FOR YOU TO DO YOUR WORK, TAKE CARE OF THINGS AT HOME, OR GET ALONG WITH OTHER PEOPLE: 0
SUM OF ALL RESPONSES TO PHQ QUESTIONS 1-9: 0
6. FEELING BAD ABOUT YOURSELF - OR THAT YOU ARE A FAILURE OR HAVE LET YOURSELF OR YOUR FAMILY DOWN: 0
2. FEELING DOWN, DEPRESSED OR HOPELESS: 0
5. POOR APPETITE OR OVEREATING: 0
1. LITTLE INTEREST OR PLEASURE IN DOING THINGS: 0
9. THOUGHTS THAT YOU WOULD BE BETTER OFF DEAD, OR OF HURTING YOURSELF: 0
SUM OF ALL RESPONSES TO PHQ QUESTIONS 1-9: 0
SUM OF ALL RESPONSES TO PHQ9 QUESTIONS 1 & 2: 0
SUM OF ALL RESPONSES TO PHQ QUESTIONS 1-9: 0
4. FEELING TIRED OR HAVING LITTLE ENERGY: 0
8. MOVING OR SPEAKING SO SLOWLY THAT OTHER PEOPLE COULD HAVE NOTICED. OR THE OPPOSITE, BEING SO FIGETY OR RESTLESS THAT YOU HAVE BEEN MOVING AROUND A LOT MORE THAN USUAL: 0
SUM OF ALL RESPONSES TO PHQ QUESTIONS 1-9: 0
3. TROUBLE FALLING OR STAYING ASLEEP: 0

## 2022-12-12 NOTE — PROGRESS NOTES
2022    TELEHEALTH EVALUATION -- Audio/Visual (During ZBHUT-49 public health emergency)    HPI:    Saleem Mercado (:  1975) has requested an audio/video evaluation for the following concern(s):    Pt presents for a one month follow up. She tried to wean off of effexor. But did not go well. She is taking it every other day. Seems to be doing ok on the wellbutrin  Has not followed up with travis yet   No SI/HI. She is ok until someone ask if she is ok. Bump on finger. Mother has arthritis. Review of Systems   Constitutional:  Negative for chills, fatigue and fever. HENT:  Negative for ear discharge, ear pain, sinus pressure, sinus pain, sore throat and trouble swallowing. Eyes:  Negative for discharge, redness and itching. Respiratory:  Negative for cough, chest tightness, shortness of breath and wheezing. Cardiovascular:  Negative for chest pain. Gastrointestinal:  Negative for abdominal pain, diarrhea, nausea and vomiting. Genitourinary:  Negative for difficulty urinating. Musculoskeletal:  Negative for arthralgias and neck pain. Skin:  Negative for rash. Neurological:  Negative for dizziness, weakness, light-headedness and headaches. Psychiatric/Behavioral:  The patient is nervous/anxious. All other systems reviewed and are negative. Prior to Visit Medications    Medication Sig Taking?  Authorizing Provider   buPROPion (WELLBUTRIN XL) 300 MG extended release tablet Take 1 tablet by mouth every morning Yes JADE Daniels CNP   levothyroxine (SYNTHROID) 50 MCG tablet TAKE 1 TABLET BY MOUTH ONE TIME A DAY Yes JADE Daniels CNP   venlafaxine (EFFEXOR XR) 75 MG extended release capsule Take 1 capsule by mouth daily Yes JADE Daniels CNP   fluticasone (FLONASE) 50 MCG/ACT nasal spray 2 sprays by Nasal route daily Yes JADE Batista CNP       Social History     Tobacco Use    Smoking status: Former     Packs/day: 0.50     Years: 2.00     Pack years: 1.00     Types: Cigarettes     Start date: 2000     Quit date: 2002     Years since quittin.5    Smokeless tobacco: Never   Vaping Use    Vaping Use: Never used   Substance Use Topics    Alcohol use: No    Drug use: No        No Known Allergies,   Past Medical History:   Diagnosis Date    Abnormal Pap smear 2005    Anxiety     GDM (gestational diabetes mellitus) 2007    Hypothyroidism    ,   Past Surgical History:   Procedure Laterality Date    CERVIX BIOPSY      COLPOSCOPY      INTRAUTERINE DEVICE INSERTION  2013    Mirena    INTRAUTERINE DEVICE INSERTION  07/10/2018    Mirena     INTRAUTERINE DEVICE REMOVAL  07/10/2018    LEEP  2005   ,   Social History     Tobacco Use    Smoking status: Former     Packs/day: 0.50     Years: 2.00     Pack years: 1.00     Types: Cigarettes     Start date: 2000     Quit date: 2002     Years since quittin.5    Smokeless tobacco: Never   Vaping Use    Vaping Use: Never used   Substance Use Topics    Alcohol use: No    Drug use: No   ,   Family History   Problem Relation Age of Onset    Heart Disease Maternal Aunt     Coronary Art Dis Maternal Aunt     Obesity Maternal Aunt     Heart Disease Maternal Grandmother     Heart Disease Maternal Grandfather     Cancer Maternal Grandfather         prostate/lung    Other Paternal Grandfather         aneurysm    Stroke Paternal Grandfather     High Cholesterol Mother     Miscarriages / [place] Mother     Other Father         mass in his colon     Diabetes Father     Cancer Father         colon   ,   Immunization History   Administered Date(s) Administered    COVID-19, MODERNA BLUE border, Primary or Immunocompromised, (age 12y+), IM, 100 mcg/0.5mL 2021, 2021    Influenza Virus Vaccine 10/31/2016, 10/04/2017, 2018, 10/23/2019, 10/09/2020, 10/14/2021    Influenza Whole 10/31/2016    Influenza, AFLURIA (age 1 yrs+), FLUZONE, (age 10 mo+), MDV, 0.5mL 10/04/2017, 09/27/2018    Tdap (Boostrix, Adacel) 05/17/2017   ,   Health Maintenance   Topic Date Due    COVID-19 Vaccine (3 - Booster for Moderna series) 04/05/2021    Breast cancer screen  11/20/2022    Cervical cancer screen  09/23/2023    Depression Monitoring  12/12/2023    Diabetes screen  11/17/2025    DTaP/Tdap/Td vaccine (2 - Td or Tdap) 05/17/2027    Lipids  09/07/2027    Colorectal Cancer Screen  10/13/2027    Flu vaccine  Completed    Hepatitis A vaccine  Aged Out    Hib vaccine  Aged Out    Meningococcal (ACWY) vaccine  Aged Out    Pneumococcal 0-64 years Vaccine  Aged Out    Hepatitis C screen  Discontinued    HIV screen  Discontinued       PHYSICAL EXAMINATION:  [ INSTRUCTIONS:  \"[x]\" Indicates a positive item  \"[]\" Indicates a negative item  -- DELETE ALL ITEMS NOT EXAMINED]  Vital Signs: (As obtained by patient/caregiver or practitioner observation)    Constitutional: [x] Appears well-developed and well-nourished [x] No apparent distress      [] Abnormal-   Mental status  [x] Alert and awake  [x] Oriented to person/place/time [x]Able to follow commands      Eyes:  EOM    [x]  Normal  [] Abnormal-  Sclera  []  Normal  [] Abnormal -         Discharge []  None visible  [] Abnormal -    HENT:   [x] Normocephalic, atraumatic.   [] Abnormal   [x] Mouth/Throat: Mucous membranes are moist.     External Ears [x] Normal  [] Abnormal-     Neck: [x] No visualized mass     Pulmonary/Chest: [x] Respiratory effort normal.  [x] No visualized signs of difficulty breathing or respiratory distress        [] Abnormal-      Musculoskeletal:   [] Normal gait with no signs of ataxia         [x] Normal range of motion of neck        [] Abnormal-       Neurological:        [x] No Facial Asymmetry (Cranial nerve 7 motor function) (limited exam to video visit)          [] No gaze palsy        [] Abnormal-         Skin:        [x] No significant exanthematous lesions or discoloration noted on facial skin         [] Abnormal- Psychiatric:       [x] Normal Affect [] No Hallucinations        [] Abnormal-     ASSESSMENT/PLAN:  1. Anxiety  INCREASE- buPROPion (WELLBUTRIN XL) 300 MG extended release tablet; Take 1 tablet by mouth every morning  Dispense: 90 tablet; Refill: 0  DECREASE- venlafaxine (EFFEXOR XR) 75 MG extended release capsule; Take 1 capsule by mouth daily  Dispense: 90 capsule; Refill: 0  Has not had time to f/u with behavioral health  2. Mild depression  - buPROPion (WELLBUTRIN XL) 300 MG extended release tablet; Take 1 tablet by mouth every morning  Dispense: 90 tablet; Refill: 0  - venlafaxine (EFFEXOR XR) 75 MG extended release capsule; Take 1 capsule by mouth daily  Dispense: 90 capsule; Refill: 0    3. Acquired hypothyroidism  Recent lab work stable  - levothyroxine (SYNTHROID) 50 MCG tablet; TAKE 1 TABLET BY MOUTH ONE TIME A DAY  Dispense: 90 tablet; Refill: 1    F/u in one month. May return sooner if needed     Return in about 1 month (around 1/12/2023) for depression/anxiety . Abby Thuan, was evaluated through a synchronous (real-time) audio-video encounter. The patient (or guardian if applicable) is aware that this is a billable service, which includes applicable co-pays. This Virtual Visit was conducted with patient's (and/or legal guardian's) consent. The visit was conducted pursuant to the emergency declaration under the [facility], [facility] authority and the [place] [employer] [place] [employer] Act. Patient identification was verified, and a caregiver was present when appropriate. The patient was located at Home: [address] [facility] [address] [facility]. Provider was located at [facility] ([address] [place], [place] [address]t): [address] [place] [address] [address]  [address] [address]  Miroslava Rao  [facility] [address] Total time spent on this encounter: Not billed by time    --JADE Kaye CNP on 12/12/2022 at 1:44 PM    An electronic signature was used to authenticate this note.

## 2023-01-30 ENCOUNTER — OFFICE VISIT (OUTPATIENT)
Dept: FAMILY MEDICINE CLINIC | Age: 48
End: 2023-01-30
Payer: COMMERCIAL

## 2023-01-30 VITALS
HEART RATE: 95 BPM | BODY MASS INDEX: 41.03 KG/M2 | SYSTOLIC BLOOD PRESSURE: 137 MMHG | OXYGEN SATURATION: 99 % | WEIGHT: 209 LBS | HEIGHT: 60 IN | DIASTOLIC BLOOD PRESSURE: 87 MMHG | RESPIRATION RATE: 16 BRPM | TEMPERATURE: 97.2 F

## 2023-01-30 DIAGNOSIS — H66.001 NON-RECURRENT ACUTE SUPPURATIVE OTITIS MEDIA OF RIGHT EAR WITHOUT SPONTANEOUS RUPTURE OF TYMPANIC MEMBRANE: Primary | ICD-10-CM

## 2023-01-30 PROCEDURE — 99213 OFFICE O/P EST LOW 20 MIN: CPT | Performed by: NURSE PRACTITIONER

## 2023-01-30 RX ORDER — AMOXICILLIN AND CLAVULANATE POTASSIUM 875; 125 MG/1; MG/1
1 TABLET, FILM COATED ORAL 2 TIMES DAILY
Qty: 20 TABLET | Refills: 0 | Status: SHIPPED | OUTPATIENT
Start: 2023-01-30 | End: 2023-02-09

## 2023-01-30 ASSESSMENT — PATIENT HEALTH QUESTIONNAIRE - PHQ9
2. FEELING DOWN, DEPRESSED OR HOPELESS: 0
5. POOR APPETITE OR OVEREATING: 0
SUM OF ALL RESPONSES TO PHQ QUESTIONS 1-9: 0
9. THOUGHTS THAT YOU WOULD BE BETTER OFF DEAD, OR OF HURTING YOURSELF: 0
SUM OF ALL RESPONSES TO PHQ QUESTIONS 1-9: 0
8. MOVING OR SPEAKING SO SLOWLY THAT OTHER PEOPLE COULD HAVE NOTICED. OR THE OPPOSITE, BEING SO FIGETY OR RESTLESS THAT YOU HAVE BEEN MOVING AROUND A LOT MORE THAN USUAL: 0
4. FEELING TIRED OR HAVING LITTLE ENERGY: 0
1. LITTLE INTEREST OR PLEASURE IN DOING THINGS: 0
SUM OF ALL RESPONSES TO PHQ QUESTIONS 1-9: 0
7. TROUBLE CONCENTRATING ON THINGS, SUCH AS READING THE NEWSPAPER OR WATCHING TELEVISION: 0
3. TROUBLE FALLING OR STAYING ASLEEP: 0
10. IF YOU CHECKED OFF ANY PROBLEMS, HOW DIFFICULT HAVE THESE PROBLEMS MADE IT FOR YOU TO DO YOUR WORK, TAKE CARE OF THINGS AT HOME, OR GET ALONG WITH OTHER PEOPLE: 0
SUM OF ALL RESPONSES TO PHQ QUESTIONS 1-9: 0
6. FEELING BAD ABOUT YOURSELF - OR THAT YOU ARE A FAILURE OR HAVE LET YOURSELF OR YOUR FAMILY DOWN: 0
SUM OF ALL RESPONSES TO PHQ9 QUESTIONS 1 & 2: 0

## 2023-01-30 ASSESSMENT — ENCOUNTER SYMPTOMS
COUGH: 0
EYE PAIN: 0
SHORTNESS OF BREATH: 0
RHINORRHEA: 0
CHEST TIGHTNESS: 0
VOMITING: 0
SORE THROAT: 0
NAUSEA: 0

## 2023-01-30 NOTE — PROGRESS NOTES
[address] WALK-IN  [address] [facility] [address]  [address] [address]  Dept: [phone]  Dept Fax: [phone]    Teresa Skaggs is a 53 y.o. female who presents today for her medical conditions/complaints of   Chief Complaint   Patient presents with    Otalgia     With pressure of right ear on and off nasal congestion with green/yellow snot x 5 days, has used Flonase and Mucinex with minimal relief            HPI:     /87 (Site: Left Upper Arm, Position: Sitting, Cuff Size: Large Adult)   Pulse 95   Temp 97.2 °F (36.2 °C) (Temporal)   Resp 16   Ht 5' (1.524 m)   Wt 209 lb (94.8 kg)   SpO2 99%   BMI 40.82 kg/m²       HPI Pt presented to the clinic today with c/o right ear pain. This is a new problem. The current episode started 5 days ago. The problem has been worsening since onset. Associated symptoms include: congestion, teeth ache . Pertinent negatives include: No fever, chills, ear drainage, SOB, chest pain, abdominal pain. Pt has tried Flonase and Mucinex-D and Tylenol with little improvement.        Past Medical History:   Diagnosis Date    Abnormal Pap smear 01/01/2005    Anxiety     GDM (gestational diabetes mellitus) 01/01/2007    Hypothyroidism         Past Surgical History:   Procedure Laterality Date    CERVIX BIOPSY      COLPOSCOPY      INTRAUTERINE DEVICE INSERTION  05/2013    Mirena    INTRAUTERINE DEVICE INSERTION  07/10/2018    Mirena     INTRAUTERINE DEVICE REMOVAL  07/10/2018    SUPA  2005       Family History   Problem Relation Age of Onset    Heart Disease Maternal Aunt     Coronary Art Dis Maternal Aunt     Obesity Maternal Aunt     Heart Disease Maternal Grandmother     Heart Disease Maternal Grandfather     Cancer Maternal Grandfather         prostate/lung    Other Paternal Grandfather         aneurysm    Stroke Paternal Grandfather     High Cholesterol Mother     Miscarriages / [place] Mother Other Father         mass in his colon     Diabetes Father     Cancer Father         colon       Social History     Tobacco Use    Smoking status: Former     Packs/day: 0.50     Years: 2.00     Pack years: 1.00     Types: Cigarettes     Start date: 2000     Quit date: 2002     Years since quittin.6    Smokeless tobacco: Never   Substance Use Topics    Alcohol use: No        Prior to Visit Medications    Medication Sig Taking? Authorizing Provider   amoxicillin-clavulanate (AUGMENTIN) 875-125 MG per tablet Take 1 tablet by mouth 2 times daily for 10 days Yes Lam Quick APRN - CNP   buPROPion (WELLBUTRIN XL) 300 MG extended release tablet Take 1 tablet by mouth every morning Yes Hardy Anguiano APRN - CNP   levothyroxine (SYNTHROID) 50 MCG tablet TAKE 1 TABLET BY MOUTH ONE TIME A DAY Yes Hardy Anguiano APRN - CNP   venlafaxine (EFFEXOR XR) 75 MG extended release capsule Take 1 capsule by mouth daily Yes Hardy Anguiano APRN - CNP   fluticasone (FLONASE) 50 MCG/ACT nasal spray 2 sprays by Nasal route daily Yes Lam Quick APRN - CNP       No Known Allergies      Subjective:      Review of Systems   Constitutional:  Negative for chills and fever. HENT:  Positive for congestion and ear pain. Negative for ear discharge, rhinorrhea and sore throat. Eyes:  Negative for pain and visual disturbance. Respiratory:  Negative for cough, chest tightness and shortness of breath. Cardiovascular:  Negative for chest pain, palpitations and leg swelling. Gastrointestinal:  Negative for nausea and vomiting. Genitourinary:  Negative for decreased urine volume and difficulty urinating. Musculoskeletal:  Negative for gait problem, myalgias and neck pain. Skin:  Negative for pallor and rash. Neurological:  Negative for weakness, light-headedness and headaches. Psychiatric/Behavioral:  Negative for sleep disturbance. Objective:     Physical Exam  Vitals and nursing note reviewed. Constitutional:       General: She is not in acute distress. Appearance: Normal appearance. HENT:      Head: Normocephalic and atraumatic. Jaw: No trismus. Right Ear: Ear canal normal. Tympanic membrane is erythematous. Left Ear: Tympanic membrane and ear canal normal.      Nose: Congestion present. Mouth/Throat:      Lips: Pink. Mouth: Mucous membranes are moist.      Pharynx: Oropharynx is clear. Uvula midline. Eyes:      Extraocular Movements: Extraocular movements intact. Conjunctiva/sclera: Conjunctivae normal.   Cardiovascular:      Rate and Rhythm: Normal rate and regular rhythm. Pulses: Normal pulses. Pulmonary:      Effort: Pulmonary effort is normal.      Breath sounds: Normal breath sounds. Abdominal:      General: Bowel sounds are normal.      Palpations: Abdomen is soft. Musculoskeletal:         General: Normal range of motion. Cervical back: Normal range of motion and neck supple. Skin:     General: Skin is warm and dry. Capillary Refill: Capillary refill takes less than 2 seconds. Coloration: Skin is not pale. Neurological:      Mental Status: She is alert and oriented to person, place, and time. Coordination: Coordination normal.      Gait: Gait normal.   Psychiatric:         Mood and Affect: Mood normal.         Thought Content: Thought content normal.         MEDICAL DECISION MAKING Assessment/Plan:     Chandni Perdomo was seen today for otalgia. Diagnoses and all orders for this visit:    Non-recurrent acute suppurative otitis media of right ear without spontaneous rupture of tympanic membrane  -     amoxicillin-clavulanate (AUGMENTIN) 875-125 MG per tablet;  Take 1 tablet by mouth 2 times daily for 10 days      Results for orders placed or performed during the hospital encounter of 11/17/22   Hemoglobin A1C   Result Value Ref Range    Hemoglobin A1C 5.3 4.0 - 6.0 %    Estimated Avg Glucose 105 mg/dL   TSH with Reflex   Result Value Ref Range    TSH 0.67 0.30 - 5.00 uIU/mL     Based on patient's history and exam findings, I will treat this as an otitis media.  Patient instructed to complete antibiotic prescription fully.  Increase fluids.  May use Motrin/Tylenol for fever/pain as directed on the bottle.   Warm compresses as desired for ear pain.  Follow up if symptoms do not improve.  Go to the ER for any emergent concern.      Patient given educational materials - see patientinstructions.  Discussed use, benefit, and side effects of prescribed medications.  All patient questions answered. Pt verbalized understanding.  Instructed to continue current medications, diet and exercise.  Patient agreed with treatment plan. Follow up as directed.     Electronically signed by JADE ALLEN CNP on 1/30/2023 at 10:48 AM

## 2023-02-14 ENCOUNTER — OFFICE VISIT (OUTPATIENT)
Dept: PRIMARY CARE CLINIC | Age: 48
End: 2023-02-14
Payer: COMMERCIAL

## 2023-02-14 VITALS
DIASTOLIC BLOOD PRESSURE: 88 MMHG | SYSTOLIC BLOOD PRESSURE: 112 MMHG | OXYGEN SATURATION: 99 % | WEIGHT: 204.4 LBS | RESPIRATION RATE: 16 BRPM | BODY MASS INDEX: 39.92 KG/M2 | TEMPERATURE: 97.9 F | HEART RATE: 101 BPM

## 2023-02-14 DIAGNOSIS — J34.89 RHINORRHEA: Primary | ICD-10-CM

## 2023-02-14 DIAGNOSIS — H92.09 OTALGIA, UNSPECIFIED LATERALITY: ICD-10-CM

## 2023-02-14 PROCEDURE — 99213 OFFICE O/P EST LOW 20 MIN: CPT | Performed by: NURSE PRACTITIONER

## 2023-02-14 RX ORDER — AZELASTINE 1 MG/ML
2 SPRAY, METERED NASAL 2 TIMES DAILY
Qty: 60 ML | Refills: 0 | Status: SHIPPED | OUTPATIENT
Start: 2023-02-14

## 2023-02-14 RX ORDER — METHYLPREDNISOLONE 4 MG/1
TABLET ORAL
Qty: 1 KIT | Refills: 0 | Status: SHIPPED | OUTPATIENT
Start: 2023-02-14 | End: 2023-02-20

## 2023-02-14 SDOH — ECONOMIC STABILITY: INCOME INSECURITY: HOW HARD IS IT FOR YOU TO PAY FOR THE VERY BASICS LIKE FOOD, HOUSING, MEDICAL CARE, AND HEATING?: NOT HARD AT ALL

## 2023-02-14 SDOH — ECONOMIC STABILITY: FOOD INSECURITY: WITHIN THE PAST 12 MONTHS, THE FOOD YOU BOUGHT JUST DIDN'T LAST AND YOU DIDN'T HAVE MONEY TO GET MORE.: NEVER TRUE

## 2023-02-14 SDOH — ECONOMIC STABILITY: FOOD INSECURITY: WITHIN THE PAST 12 MONTHS, YOU WORRIED THAT YOUR FOOD WOULD RUN OUT BEFORE YOU GOT MONEY TO BUY MORE.: NEVER TRUE

## 2023-02-14 SDOH — ECONOMIC STABILITY: HOUSING INSECURITY
IN THE LAST 12 MONTHS, WAS THERE A TIME WHEN YOU DID NOT HAVE A STEADY PLACE TO SLEEP OR SLEPT IN A SHELTER (INCLUDING NOW)?: NO

## 2023-02-14 ASSESSMENT — ENCOUNTER SYMPTOMS
EYE DISCHARGE: 0
SORE THROAT: 1
SINUS PRESSURE: 0
VOMITING: 0
DIARRHEA: 0
NAUSEA: 0
RHINORRHEA: 1
CHEST TIGHTNESS: 0
EYE REDNESS: 0
SHORTNESS OF BREATH: 0
WHEEZING: 0
SINUS PAIN: 0
EYE ITCHING: 0
TROUBLE SWALLOWING: 0
ABDOMINAL PAIN: 0
COUGH: 0

## 2023-02-14 NOTE — PROGRESS NOTES
[address] [facility] PRIMARY CARE  [address]  [address] [address]  Dept: [phone]  Dept Fax: [phone]    Kimmy Pearl is a 53 y.o. female who presents today for her medical conditions/complaintsas noted below. Kimmy Pearl is c/o of Pharyngitis (Was seen in urgent care 1/30, finished all prescribed meds) and Cough        HPI:     Patient presents for same-day appointment  Blood pressure stable  Weight is down 6 pounds since last visit    Patient presents for same-day appointment with complaints of ear pain. Symptoms started 10 days ago she came in d/t ear pain. Came to walk in. She was given augmentin. Ear pain is better but now has a sore throat. Daughter boyfriend has strep. She has congestion in chest. Clear drainage. Coughing up green sputum. Always feels like she has something in the back of her throat.          Past Medical History:   Diagnosis Date    Abnormal Pap smear 01/01/2005    Anxiety     GDM (gestational diabetes mellitus) 01/01/2007    Hypothyroidism       Past Surgical History:   Procedure Laterality Date    CERVIX BIOPSY      COLPOSCOPY      INTRAUTERINE DEVICE INSERTION  05/2013    Mirena    INTRAUTERINE DEVICE INSERTION  07/10/2018    Mirena     INTRAUTERINE DEVICE REMOVAL  07/10/2018    SUPA  2005       Family History   Problem Relation Age of Onset    Heart Disease Maternal Aunt     Coronary Art Dis Maternal Aunt     Obesity Maternal Aunt     Heart Disease Maternal Grandmother     Heart Disease Maternal Grandfather     Cancer Maternal Grandfather         prostate/lung    Other Paternal Grandfather         aneurysm    Stroke Paternal Grandfather     High Cholesterol Mother     Miscarriages / [place] Mother     Other Father         mass in his colon     Diabetes Father     Cancer Father         colon       Social History     Tobacco Use    Smoking status: Former     Packs/day: 0.50     Years: 2.00     Pack years: 1.00     Types: Cigarettes     Start date: 2000     Quit date: 2002     Years since quittin.7    Smokeless tobacco: Never   Substance Use Topics    Alcohol use: No      Current Outpatient Medications   Medication Sig Dispense Refill    azelastine (ASTELIN) 0.1 % nasal spray 2 sprays by Nasal route 2 times daily Use in each nostril as directed 60 mL 0    methylPREDNISolone (MEDROL, RJ,) 4 MG tablet Take as directed 1 kit 0    buPROPion (WELLBUTRIN XL) 300 MG extended release tablet Take 1 tablet by mouth every morning 90 tablet 0    levothyroxine (SYNTHROID) 50 MCG tablet TAKE 1 TABLET BY MOUTH ONE TIME A DAY 90 tablet 1    venlafaxine (EFFEXOR XR) 75 MG extended release capsule Take 1 capsule by mouth daily 90 capsule 0    fluticasone (FLONASE) 50 MCG/ACT nasal spray 2 sprays by Nasal route daily 16 g 0     No current facility-administered medications for this visit. No Known Allergies    Health Maintenance   Topic Date Due    COVID-19 Vaccine (3 - Booster for Moderna series) 2021    Breast cancer screen  2022    Cervical cancer screen  2023    Depression Monitoring  2024    Diabetes screen  2025    DTaP/Tdap/Td vaccine (2 - Td or Tdap) 2027    Lipids  2027    Colorectal Cancer Screen  10/13/2027    Flu vaccine  Completed    Hepatitis A vaccine  Aged Out    Hib vaccine  Aged Out    Meningococcal (ACWY) vaccine  Aged Out    Pneumococcal 0-64 years Vaccine  Aged Out    Hepatitis C screen  Discontinued    HIV screen  Discontinued       :     Review of Systems   Constitutional:  Negative for chills, fatigue and fever. HENT:  Positive for congestion, postnasal drip, rhinorrhea and sore throat. Negative for ear discharge, ear pain, sinus pressure, sinus pain and trouble swallowing. Eyes:  Negative for discharge, redness and itching. Respiratory:  Negative for cough, chest tightness, shortness of breath and wheezing. Cardiovascular:  Negative for chest pain. Gastrointestinal:  Negative for abdominal pain, diarrhea, nausea and vomiting. Genitourinary:  Negative for difficulty urinating. Musculoskeletal:  Negative for arthralgias and neck pain. Skin:  Negative for rash. Neurological:  Negative for dizziness, weakness, light-headedness and headaches. All other systems reviewed and are negative. Objective:     Physical Exam  Constitutional:       Appearance: Normal appearance. She is normal weight. HENT:      Head: Normocephalic and atraumatic. Right Ear: A middle ear effusion is present. Left Ear: A middle ear effusion is present. Nose: Nose normal.      Mouth/Throat:      Pharynx: Oropharynx is clear. Tonsils: No tonsillar exudate. 1+ on the right. 1+ on the left. Eyes:      Extraocular Movements: Extraocular movements intact. Conjunctiva/sclera: Conjunctivae normal.      Pupils: Pupils are equal, round, and reactive to light. Cardiovascular:      Rate and Rhythm: Normal rate and regular rhythm. Pulses: Normal pulses. Heart sounds: Normal heart sounds. Pulmonary:      Effort: Pulmonary effort is normal.      Breath sounds: Normal breath sounds. Abdominal:      General: Abdomen is flat. Palpations: Abdomen is soft. Musculoskeletal:         General: Normal range of motion. Cervical back: Neck supple. Skin:     General: Skin is warm and dry. Capillary Refill: Capillary refill takes less than 2 seconds. Neurological:      General: No focal deficit present. Mental Status: She is alert and oriented to person, place, and time. Psychiatric:         Mood and Affect: Mood normal.     /88 (Site: Right Upper Arm, Position: Sitting, Cuff Size: Large Adult)   Pulse (!) 101   Temp 97.9 °F (36.6 °C) (Oral)   Resp 16   Wt 204 lb 6.4 oz (92.7 kg)   SpO2 99%   BMI 39.92 kg/m²     Assessment:       Diagnosis Orders   1. Rhinorrhea        2.  Otalgia, unspecified laterality            : Return if symptoms worsen or fail to improve.  1-2. Rhinorrhea and otalgia  Rx for medrol dose pack  Recently finished augmentin. Reviewed urgent care note   Rx for azelastine    Fu as needed   Orders Placed This Encounter   Medications    azelastine (ASTELIN) 0.1 % nasal spray     Si sprays by Nasal route 2 times daily Use in each nostril as directed     Dispense:  60 mL     Refill:  0    methylPREDNISolone (MEDROL, RJ,) 4 MG tablet     Sig: Take as directed     Dispense:  1 kit     Refill:  0       Patient given educational materials - seepatient instructions. Discussed use, benefit, and side effects of prescribed medications. All patient questions answered. Pt voiced understanding. Reviewed health maintenance. Instructed to continue current medications, diet and exercise. Patient agreedwith treatment plan. Follow up as directed.       Electronically signed by JADE Vela CNP on  2:06 PM

## 2023-02-20 ENCOUNTER — OFFICE VISIT (OUTPATIENT)
Dept: FAMILY MEDICINE CLINIC | Age: 48
End: 2023-02-20
Payer: COMMERCIAL

## 2023-02-20 VITALS
HEART RATE: 90 BPM | DIASTOLIC BLOOD PRESSURE: 74 MMHG | OXYGEN SATURATION: 98 % | RESPIRATION RATE: 16 BRPM | WEIGHT: 204.8 LBS | SYSTOLIC BLOOD PRESSURE: 116 MMHG | BODY MASS INDEX: 40 KG/M2

## 2023-02-20 DIAGNOSIS — H65.92 MEE (MIDDLE EAR EFFUSION), LEFT: ICD-10-CM

## 2023-02-20 DIAGNOSIS — H66.014 RECURRENT ACUTE SUPPURATIVE OTITIS MEDIA OF RIGHT EAR WITH SPONTANEOUS RUPTURE OF TYMPANIC MEMBRANE: Primary | ICD-10-CM

## 2023-02-20 PROCEDURE — 99213 OFFICE O/P EST LOW 20 MIN: CPT | Performed by: NURSE PRACTITIONER

## 2023-02-20 RX ORDER — DOXYCYCLINE HYCLATE 100 MG
100 TABLET ORAL 2 TIMES DAILY
Qty: 20 TABLET | Refills: 0 | Status: SHIPPED | OUTPATIENT
Start: 2023-02-20

## 2023-02-20 RX ORDER — CIPROFLOXACIN AND DEXAMETHASONE 3; 1 MG/ML; MG/ML
4 SUSPENSION/ DROPS AURICULAR (OTIC) 2 TIMES DAILY
Qty: 7.5 ML | Refills: 0 | Status: SHIPPED | OUTPATIENT
Start: 2023-02-20 | End: 2023-02-27

## 2023-02-20 ASSESSMENT — ENCOUNTER SYMPTOMS
COUGH: 1
VOMITING: 0
CHEST TIGHTNESS: 0
SHORTNESS OF BREATH: 0
NAUSEA: 0
SORE THROAT: 0
EYE PAIN: 0
RHINORRHEA: 0

## 2023-02-20 NOTE — PROGRESS NOTES
[facility] WALK-IN  [address] [place] [address] [facility] [address] [address]  Dept: [phone]  Dept Fax: [phone]    Delmer Martinez is a 53 y.o. female who presents today for her medical conditions/complaints of   Chief Complaint   Patient presents with    Otalgia     Right- possible perforated eardrum           HPI:     /74   Pulse 90   Resp 16   Wt 204 lb 12.8 oz (92.9 kg)   SpO2 98%   BMI 40.00 kg/m²       HPI  Pt presented to the clinic today with c/o right ear pain. Started yesterday. She was seen here on  by her PCP for ear pain, sore throat, runny nose and congestion. Was given Medrol dose pack. Was seen here on 1/3 for right ear pain. Diagnosed with acute OM and given Augmentin. Symptoms did improve at that time. Last night noticed right ear drainage and blood. Associated symptoms include: decreased hearing . Pertinent negatives include: No fever, chills, dizziness, nausea or vomiting. Pt has tried warm compresses to the ear with little improvement.        Past Medical History:   Diagnosis Date    Abnormal Pap smear 2005    Anxiety     GDM (gestational diabetes mellitus) 2007    Hypothyroidism         Past Surgical History:   Procedure Laterality Date    CERVIX BIOPSY      COLPOSCOPY      INTRAUTERINE DEVICE INSERTION  2013    Mirena    INTRAUTERINE DEVICE INSERTION  07/10/2018    Mirena     INTRAUTERINE DEVICE REMOVAL  07/10/2018    SUPA         Family History   Problem Relation Age of Onset    Heart Disease Maternal Aunt     Coronary Art Dis Maternal Aunt     Obesity Maternal Aunt     Heart Disease Maternal Grandmother     Heart Disease Maternal Grandfather     Cancer Maternal Grandfather         prostate/lung    Other Paternal Grandfather         aneurysm    Stroke Paternal Grandfather     High Cholesterol Mother     Miscarriages / [place] Mother     Other Father         mass in his colon Diabetes Father     Cancer Father         colon       Social History     Tobacco Use    Smoking status: Former     Packs/day: 0.50     Years: 2.00     Pack years: 1.00     Types: Cigarettes     Start date: 2000     Quit date: 2002     Years since quittin.7    Smokeless tobacco: Never   Substance Use Topics    Alcohol use: No        Prior to Visit Medications    Medication Sig Taking? Authorizing Provider   ciprofloxacin-dexamethasone (CIPRODEX) 0.3-0.1 % otic suspension Place 4 drops into the right ear 2 times daily for 7 days Yes JADE Mehta CNP   doxycycline hyclate (VIBRA-TABS) 100 MG tablet Take 1 tablet by mouth 2 times daily Yes JADE Mehta CNP   azelastine (ASTELIN) 0.1 % nasal spray 2 sprays by Nasal route 2 times daily Use in each nostril as directed Yes JADE Beavers CNP   methylPREDNISolone (MEDROL, RJ,) 4 MG tablet Take as directed Yes JADE Beavers CNP   buPROPion (WELLBUTRIN XL) 300 MG extended release tablet Take 1 tablet by mouth every morning Yes JADE Beavers CNP   levothyroxine (SYNTHROID) 50 MCG tablet TAKE 1 TABLET BY MOUTH ONE TIME A DAY Yes JADE Beavers CNP   venlafaxine (EFFEXOR XR) 75 MG extended release capsule Take 1 capsule by mouth daily Yes JADE Beavers CNP   fluticasone (FLONASE) 50 MCG/ACT nasal spray 2 sprays by Nasal route daily Yes JADE Mehta CNP       No Known Allergies      Subjective:      Review of Systems   Constitutional:  Negative for chills and fever. HENT:  Positive for congestion, ear discharge, ear pain and hearing loss (decreased hearing right). Negative for rhinorrhea and sore throat. Eyes:  Negative for pain and visual disturbance. Respiratory:  Positive for cough. Negative for chest tightness and shortness of breath. Cardiovascular:  Negative for chest pain, palpitations and leg swelling. Gastrointestinal:  Negative for nausea and vomiting. Genitourinary:  Negative for decreased urine volume and difficulty urinating. Musculoskeletal:  Negative for gait problem, myalgias and neck pain. Skin:  Negative for pallor and rash. Neurological:  Negative for dizziness, weakness, light-headedness and headaches. Psychiatric/Behavioral:  Negative for sleep disturbance. Objective:     Physical Exam  Vitals and nursing note reviewed. Constitutional:       General: She is not in acute distress. Appearance: Normal appearance. HENT:      Head: Normocephalic and atraumatic. Right Ear: Ear canal normal. Drainage present. Tympanic membrane is perforated and erythematous. Left Ear: Ear canal normal. A middle ear effusion is present. Tympanic membrane is injected. Ears:      Comments: There is yellow thick bloody drainage in the right ear canal.      Nose: Nose normal.      Mouth/Throat:      Lips: Pink. Mouth: Mucous membranes are moist.      Pharynx: Oropharynx is clear. Uvula midline. Eyes:      Extraocular Movements: Extraocular movements intact. Conjunctiva/sclera: Conjunctivae normal.      Pupils: Pupils are equal, round, and reactive to light. Cardiovascular:      Rate and Rhythm: Normal rate and regular rhythm. Pulses: Normal pulses. Pulmonary:      Effort: Pulmonary effort is normal.      Breath sounds: Normal breath sounds. Abdominal:      General: Bowel sounds are normal.      Palpations: Abdomen is soft. Musculoskeletal:         General: Normal range of motion. Cervical back: Normal range of motion and neck supple. Skin:     General: Skin is warm and dry. Capillary Refill: Capillary refill takes less than 2 seconds. Coloration: Skin is not pale. Neurological:      Mental Status: She is alert and oriented to person, place, and time. Coordination: Coordination normal.      Gait: Gait normal.   Psychiatric:         Mood and Affect: Mood normal.         Thought Content:  Thought content normal.         MEDICAL DECISION MAKING Assessment/Plan:     Rishabh Jackson was seen today for otalgia. Diagnoses and all orders for this visit:    Recurrent acute suppurative otitis media of right ear with spontaneous rupture of tympanic membrane  -     ciprofloxacin-dexamethasone (CIPRODEX) 0.3-0.1 % otic suspension; Place 4 drops into the right ear 2 times daily for 7 days  -     doxycycline hyclate (VIBRA-TABS) 100 MG tablet; Take 1 tablet by mouth 2 times daily  -     Joseph Simon MD, Otolaryngology, Pradeep QUINTERO (middle ear effusion), left  -     Joseph Simon MD, Otolaryngology, [facility]      Results for orders placed or performed during the hospital encounter of 11/17/22   Hemoglobin A1C   Result Value Ref Range    Hemoglobin A1C 5.3 4.0 - 6.0 %    Estimated Avg Glucose 105 mg/dL   TSH with Reflex   Result Value Ref Range    TSH 0.67 0.30 - 5.00 uIU/mL     Based on patient's history and exam findings, I will treat this as a recurrent otitis media with rupture of the right TM. Patient instructed to complete antibiotic prescription fully.- Doxy and Ciprodex ear drops as prescribed. Referral placed to ENT for follow up. Increase fluids. May use Motrin/Tylenol for fever/pain as directed on the bottle. Warm compresses as desired for ear pain. Follow up if symptoms do not improve. Go to the ER for any emergent concern. Patient given educational materials - see patientinstructions. Discussed use, benefit, and side effects of prescribed medications. All patient questions answered. Pt verbalized understanding. Instructed to continue current medications, diet and exercise. Patient agreed with treatment plan. Follow up as directed.      Electronically signed by JADE Monteiro CNP on 2/20/2023 at 9:21 AM

## 2023-02-24 DIAGNOSIS — F32.A MILD DEPRESSION: ICD-10-CM

## 2023-02-24 DIAGNOSIS — F41.9 ANXIETY: ICD-10-CM

## 2023-02-24 RX ORDER — BUPROPION HYDROCHLORIDE 300 MG/1
TABLET ORAL
Qty: 90 TABLET | Refills: 0 | Status: SHIPPED | OUTPATIENT
Start: 2023-02-24

## 2023-02-24 RX ORDER — VENLAFAXINE HYDROCHLORIDE 75 MG/1
CAPSULE, EXTENDED RELEASE ORAL
Qty: 90 CAPSULE | Refills: 0 | Status: SHIPPED | OUTPATIENT
Start: 2023-02-24

## 2023-05-31 DIAGNOSIS — F41.9 ANXIETY: ICD-10-CM

## 2023-05-31 DIAGNOSIS — F32.A MILD DEPRESSION: ICD-10-CM

## 2023-05-31 RX ORDER — BUPROPION HYDROCHLORIDE 300 MG/1
TABLET ORAL
Qty: 90 TABLET | Refills: 0 | Status: SHIPPED | OUTPATIENT
Start: 2023-05-31

## 2023-05-31 RX ORDER — VENLAFAXINE HYDROCHLORIDE 75 MG/1
CAPSULE, EXTENDED RELEASE ORAL
Qty: 90 CAPSULE | Refills: 0 | Status: SHIPPED | OUTPATIENT
Start: 2023-05-31

## 2023-07-10 ENCOUNTER — OFFICE VISIT (OUTPATIENT)
Dept: OBGYN CLINIC | Age: 48
End: 2023-07-10
Payer: COMMERCIAL

## 2023-07-10 VITALS
HEIGHT: 60 IN | SYSTOLIC BLOOD PRESSURE: 120 MMHG | DIASTOLIC BLOOD PRESSURE: 76 MMHG | WEIGHT: 201 LBS | BODY MASS INDEX: 39.46 KG/M2

## 2023-07-10 DIAGNOSIS — N92.0 MENORRHAGIA WITH REGULAR CYCLE: Primary | ICD-10-CM

## 2023-07-10 DIAGNOSIS — Z76.89 ENCOUNTER TO ESTABLISH CARE WITH NEW DOCTOR: ICD-10-CM

## 2023-07-10 DIAGNOSIS — Z30.09 BIRTH CONTROL COUNSELING: ICD-10-CM

## 2023-07-10 PROCEDURE — 99203 OFFICE O/P NEW LOW 30 MIN: CPT | Performed by: NURSE PRACTITIONER

## 2023-07-10 ASSESSMENT — ENCOUNTER SYMPTOMS
WHEEZING: 0
SHORTNESS OF BREATH: 0
VOMITING: 0
NAUSEA: 0
COLOR CHANGE: 0

## 2023-07-13 LAB
CHOLEST SERPL-MCNC: 197 MG/DL
CHOLESTEROL/HDL RATIO: 4.6
GLUCOSE SERPL-MCNC: 108 MG/DL (ref 70–99)
HDLC SERPL-MCNC: 43 MG/DL
LDLC SERPL CALC-MCNC: 133 MG/DL (ref 0–130)
PATIENT FASTING?: YES
TRIGL SERPL-MCNC: 105 MG/DL

## 2023-08-23 DIAGNOSIS — F41.9 ANXIETY: ICD-10-CM

## 2023-08-23 DIAGNOSIS — F32.A MILD DEPRESSION: ICD-10-CM

## 2023-08-23 RX ORDER — BUPROPION HYDROCHLORIDE 300 MG/1
TABLET ORAL
Qty: 90 TABLET | Refills: 0 | Status: SHIPPED | OUTPATIENT
Start: 2023-08-23

## 2023-08-23 RX ORDER — VENLAFAXINE HYDROCHLORIDE 75 MG/1
CAPSULE, EXTENDED RELEASE ORAL
Qty: 90 CAPSULE | Refills: 0 | Status: SHIPPED | OUTPATIENT
Start: 2023-08-23

## 2023-09-14 ENCOUNTER — HOSPITAL ENCOUNTER (OUTPATIENT)
Age: 48
Setting detail: SPECIMEN
Discharge: HOME | End: 2023-09-14

## 2023-09-14 ENCOUNTER — OFFICE VISIT (OUTPATIENT)
Dept: OBGYN CLINIC | Age: 48
End: 2023-09-14
Payer: COMMERCIAL

## 2023-09-14 VITALS
BODY MASS INDEX: 39.85 KG/M2 | WEIGHT: 203 LBS | SYSTOLIC BLOOD PRESSURE: 116 MMHG | DIASTOLIC BLOOD PRESSURE: 78 MMHG | HEIGHT: 60 IN

## 2023-09-14 DIAGNOSIS — Z01.419 WELL WOMAN EXAM WITH ROUTINE GYNECOLOGICAL EXAM: Primary | ICD-10-CM

## 2023-09-14 DIAGNOSIS — N89.8 VAGINAL ITCHING: ICD-10-CM

## 2023-09-14 DIAGNOSIS — Z12.31 SCREENING MAMMOGRAM FOR BREAST CANCER: ICD-10-CM

## 2023-09-14 LAB
CANDIDA SPECIES: NEGATIVE
GARDNERELLA VAGINALIS: NEGATIVE
SOURCE: NORMAL
TRICHOMONAS: NEGATIVE

## 2023-09-14 PROCEDURE — 99396 PREV VISIT EST AGE 40-64: CPT | Performed by: NURSE PRACTITIONER

## 2023-09-14 ASSESSMENT — ENCOUNTER SYMPTOMS
COUGH: 0
SHORTNESS OF BREATH: 0
COLOR CHANGE: 0
VOMITING: 0
NAUSEA: 0

## 2023-09-14 NOTE — PROGRESS NOTES
Tana Dakins is a 53 y.o.  here for her annual exam.  The patient was seen and examined. The patients past medical, surgical, social and family history were reviewed. Current medications and allergies were reviewed, and documented in the chart. Last PAP: 2020- neg cytology no HPV , hx of abnormal PAP yes - around age 24 she states had LEEP  Family hx uterine or ovarian cancer-denies  Last mammogram if indicated-, Family hx of breast cancer -maternal aunt  Colon cancer screening if indicated- was recocmended repeat 5 years, family hx colon cancer -father     Sexually active: yes,  Dyspareunia: No, Vaginal discharge: no, but has noted tender irritated area on her left labia for past few months denies vaginal odor  UTI symptoms: no, bloating: no     Menstrual history:  Menarche age- 7,   She has hx of menorrhagia she has had IUD Mirena placements for this X3 and it has worked to control her periods. She states she has spotting for few days around every 3 months no heavy bleeding or significant pain or cramping. Her last IUD was Removed and reinserted in 2018.  She denies  any complaints currently with IUD/menstrual cycle    OB History    Para Term  AB Living   2 2 2 0 0 2   SAB IAB Ectopic Molar Multiple Live Births   0 0 0   0 2      # Outcome Date GA Lbr Salty/2nd Weight Sex Delivery Anes PTL Lv   2 Term 2007 39w0d  6 lb (2.722 kg) F Vag-Spont EPI N DADA   1 Term 2003 38w0d  6 lb 5 oz (2.863 kg) F Vag-Spont EPI N DADA      Complications: GDM (gestational diabetes mellitus)       Vitals:    23 0825   BP: 116/78   Site: Right Upper Arm   Position: Sitting   Cuff Size: Medium Adult   Weight: 203 lb (92.1 kg)   Height: 5' (1.524 m)       Wt Readings from Last 3 Encounters:   23 203 lb (92.1 kg)   07/10/23 201 lb (91.2 kg)   23 204 lb 12.8 oz (92.9 kg)     Past Medical History:   Diagnosis Date    Abnormal Pap smear 2005    Anxiety     GDM

## 2023-09-19 LAB
HPV I/H RISK 4 DNA CVX QL NAA+PROBE: NOT DETECTED
HPV SAMPLE: NORMAL
HPV, INTERPRETATION: NORMAL
HPV16 DNA CVX QL NAA+PROBE: NOT DETECTED
HPV18 DNA CVX QL NAA+PROBE: NOT DETECTED
SPECIMEN DESCRIPTION: NORMAL

## 2023-09-21 LAB — CYTOLOGY REPORT: NORMAL

## 2023-09-25 ENCOUNTER — OFFICE VISIT (OUTPATIENT)
Dept: PRIMARY CARE CLINIC | Age: 48
End: 2023-09-25
Payer: COMMERCIAL

## 2023-09-25 VITALS
DIASTOLIC BLOOD PRESSURE: 84 MMHG | RESPIRATION RATE: 18 BRPM | HEART RATE: 95 BPM | OXYGEN SATURATION: 98 % | WEIGHT: 203.4 LBS | SYSTOLIC BLOOD PRESSURE: 126 MMHG | BODY MASS INDEX: 39.72 KG/M2

## 2023-09-25 DIAGNOSIS — F32.A MILD DEPRESSION: ICD-10-CM

## 2023-09-25 DIAGNOSIS — E78.2 MIXED HYPERLIPIDEMIA: Primary | ICD-10-CM

## 2023-09-25 DIAGNOSIS — Z23 NEED FOR INFLUENZA VACCINATION: ICD-10-CM

## 2023-09-25 DIAGNOSIS — F41.9 ANXIETY: ICD-10-CM

## 2023-09-25 DIAGNOSIS — H91.93 DECREASED HEARING OF BOTH EARS: ICD-10-CM

## 2023-09-25 DIAGNOSIS — E66.09 CLASS 2 OBESITY DUE TO EXCESS CALORIES WITHOUT SERIOUS COMORBIDITY WITH BODY MASS INDEX (BMI) OF 39.0 TO 39.9 IN ADULT: ICD-10-CM

## 2023-09-25 DIAGNOSIS — Z12.11 SCREENING FOR COLON CANCER: ICD-10-CM

## 2023-09-25 PROCEDURE — 90674 CCIIV4 VAC NO PRSV 0.5 ML IM: CPT | Performed by: NURSE PRACTITIONER

## 2023-09-25 PROCEDURE — 99214 OFFICE O/P EST MOD 30 MIN: CPT | Performed by: NURSE PRACTITIONER

## 2023-09-25 PROCEDURE — 90471 IMMUNIZATION ADMIN: CPT | Performed by: NURSE PRACTITIONER

## 2023-09-25 RX ORDER — PHENTERMINE HYDROCHLORIDE 37.5 MG/1
37.5 TABLET ORAL
Qty: 30 TABLET | Refills: 0 | Status: SHIPPED | OUTPATIENT
Start: 2023-09-25 | End: 2023-10-25

## 2023-09-25 ASSESSMENT — ENCOUNTER SYMPTOMS
SINUS PRESSURE: 0
SHORTNESS OF BREATH: 0
TROUBLE SWALLOWING: 0
SINUS PAIN: 0
COUGH: 0
ABDOMINAL PAIN: 0
SORE THROAT: 0
EYE DISCHARGE: 0
DIARRHEA: 0
VOMITING: 0
EYE REDNESS: 0
NAUSEA: 0
WHEEZING: 0
EYE ITCHING: 0
CHEST TIGHTNESS: 0

## 2023-09-25 ASSESSMENT — PATIENT HEALTH QUESTIONNAIRE - PHQ9: DEPRESSION UNABLE TO ASSESS: PT REFUSES

## 2023-09-25 NOTE — PROGRESS NOTES
After obtaining consent, and per orders of Irvin Contreras CNP, injection of Flucelvax given in Left deltoid by Abad Resendiz MA. Patient instructed to remain in clinic for 20 minutes afterwards, and to report any adverse reaction to me immediately.

## 2023-09-25 NOTE — PROGRESS NOTES
[address]  PRIMARY CARE  [address]  [email or web address] [address]  [place] [address]  Dept: [phone]  Dept Fax: [phone]    Lucia Bates is a 53 y.o. female who presents today for her medical conditions/complaintsas noted below. Lucia Bates is c/o of Hearing Problem (Has appt with ENT), Discuss Labs (Had Be well screening done), and Weight Management (Discuss weight loss options)        HPI:     Patient presents for an office visit  Blood pressure stable  Weight is stable    Patient presents for an office visit to discuss some concerns. She does have an appointment to get her hearing checked. Unsure if she has wax buildup in her ears. Restarted flonase over the weekend. Feels like she has drainage or buildup in her neck area. It does feel better    She had be well screening labs done. Would like to discuss    She is also been struggling to lose weight. She feels like she is plateaued and has been stuck. She would like to discuss weight loss medications    She is overdue for colonoscopy. There is questionable colon cancer history with her dad. Her last colonoscopy did find a polyp and they recommended a 5-year follow-up.         Past Medical History:   Diagnosis Date    Abnormal Pap smear 01/01/2005    Anxiety     GDM (gestational diabetes mellitus) 01/01/2007    Hyperthyroidism     Hypothyroidism     Menopausal symptoms     Postpartum depression       Past Surgical History:   Procedure Laterality Date    CERVIX BIOPSY      COLPOSCOPY      INTRAUTERINE DEVICE INSERTION  05/2013    Mirena    INTRAUTERINE DEVICE INSERTION  07/10/2018    Mirena     INTRAUTERINE DEVICE REMOVAL  07/10/2018    SUPA  2005       Family History   Problem Relation Age of Onset    Heart Disease Maternal Aunt     Coronary Art Dis Maternal Aunt     Obesity Maternal Aunt     Heart Disease Maternal Grandmother     Heart Disease Maternal Grandfather     Cancer Maternal Grandfather

## 2023-10-24 ENCOUNTER — HOSPITAL ENCOUNTER (OUTPATIENT)
Dept: WOMENS IMAGING | Age: 48
Discharge: HOME | End: 2023-10-26
Payer: COMMERCIAL

## 2023-10-24 VITALS — HEIGHT: 60 IN | WEIGHT: 203 LBS | BODY MASS INDEX: 39.85 KG/M2

## 2023-10-24 DIAGNOSIS — Z12.31 SCREENING MAMMOGRAM FOR BREAST CANCER: ICD-10-CM

## 2023-10-24 PROCEDURE — 77063 BREAST TOMOSYNTHESIS BI: CPT

## 2023-11-10 DIAGNOSIS — E03.9 ACQUIRED HYPOTHYROIDISM: ICD-10-CM

## 2023-11-11 RX ORDER — LEVOTHYROXINE SODIUM 0.05 MG/1
TABLET ORAL
Qty: 90 TABLET | Refills: 1 | Status: SHIPPED | OUTPATIENT
Start: 2023-11-11

## 2023-11-16 ENCOUNTER — OFFICE VISIT (OUTPATIENT)
Dept: PRIMARY CARE CLINIC | Age: 48
End: 2023-11-16
Payer: COMMERCIAL

## 2023-11-16 VITALS
WEIGHT: 203 LBS | TEMPERATURE: 97.9 F | OXYGEN SATURATION: 97 % | HEART RATE: 90 BPM | BODY MASS INDEX: 39.85 KG/M2 | SYSTOLIC BLOOD PRESSURE: 127 MMHG | HEIGHT: 60 IN | DIASTOLIC BLOOD PRESSURE: 85 MMHG

## 2023-11-16 DIAGNOSIS — M54.50 ACUTE BILATERAL LOW BACK PAIN WITHOUT SCIATICA: Primary | ICD-10-CM

## 2023-11-16 PROCEDURE — 99213 OFFICE O/P EST LOW 20 MIN: CPT | Performed by: NURSE PRACTITIONER

## 2023-11-16 RX ORDER — CYCLOBENZAPRINE HCL 5 MG
5 TABLET ORAL 3 TIMES DAILY PRN
Qty: 20 TABLET | Refills: 0 | Status: SHIPPED | OUTPATIENT
Start: 2023-11-16 | End: 2023-11-26

## 2023-11-16 RX ORDER — PREDNISONE 20 MG/1
40 TABLET ORAL DAILY
Qty: 10 TABLET | Refills: 0 | Status: SHIPPED | OUTPATIENT
Start: 2023-11-16 | End: 2023-11-21

## 2023-11-16 ASSESSMENT — ENCOUNTER SYMPTOMS
COUGH: 0
CHEST TIGHTNESS: 0
WHEEZING: 0
BACK PAIN: 1
ABDOMINAL PAIN: 0
RESPIRATORY NEGATIVE: 1
SHORTNESS OF BREATH: 0

## 2023-11-16 NOTE — PROGRESS NOTES
[address] [facility] WALK IN CARE  [address] [place] [id number]  Dept: [phone]  Dept Fax: [phone]     West Singh is a 53 y.o. female who presents to the urgent care today for her medicalconditions/complaints as noted below. West Singh is c/o of Back Pain (X2 weeks. Hurt while working out. Did massage and heat and still isn't better)    HPI:      Back Pain  This is a new problem. Episode onset: 2 weeks ago. The problem has been gradually worsening since onset. The pain is present in the lumbar spine. The quality of the pain is described as aching. The pain does not radiate. The pain is mild. Exacerbated by: going front sitting to standing. Pertinent negatives include no abdominal pain, chest pain, dysuria, fever, headaches, leg pain, numbness, paresis, paresthesias, tingling or weakness. Treatments tried: heat, stretches. The treatment provided no relief. Past Medical History:   Diagnosis Date    Abnormal Pap smear 01/01/2005    Anxiety     GDM (gestational diabetes mellitus) 01/01/2007    Hyperthyroidism     Hypothyroidism     Menopausal symptoms     Postpartum depression       Current Outpatient Medications   Medication Sig Dispense Refill    predniSONE (DELTASONE) 20 MG tablet Take 2 tablets by mouth daily for 5 days 10 tablet 0    cyclobenzaprine (FLEXERIL) 5 MG tablet Take 1 tablet by mouth 3 times daily as needed for Muscle spasms 20 tablet 0    levothyroxine (SYNTHROID) 50 MCG tablet TAKE 1 TABLET BY MOUTH ONE TIME A DAY 90 tablet 1    venlafaxine (EFFEXOR XR) 75 MG extended release capsule TAKE ONE CAPSULE BY MOUTH ONCE A DAY 90 capsule 0    buPROPion (WELLBUTRIN XL) 300 MG extended release tablet TAKE ONE TABLET BY MOUTH EVERY MORNING 90 tablet 0     No current facility-administered medications for this visit. No Known Allergies    Reviewed PMH, SH, and FH with the patient and updated.     Subjective:      Review

## 2023-11-19 DIAGNOSIS — F41.9 ANXIETY: ICD-10-CM

## 2023-11-19 DIAGNOSIS — F32.A MILD DEPRESSION: ICD-10-CM

## 2023-11-20 RX ORDER — BUPROPION HYDROCHLORIDE 300 MG/1
TABLET ORAL
Qty: 90 TABLET | Refills: 0 | Status: SHIPPED | OUTPATIENT
Start: 2023-11-20

## 2023-11-20 RX ORDER — VENLAFAXINE HYDROCHLORIDE 75 MG/1
CAPSULE, EXTENDED RELEASE ORAL
Qty: 90 CAPSULE | Refills: 0 | Status: SHIPPED | OUTPATIENT
Start: 2023-11-20

## 2024-02-26 DIAGNOSIS — F41.9 ANXIETY: ICD-10-CM

## 2024-02-26 DIAGNOSIS — F32.A MILD DEPRESSION: ICD-10-CM

## 2024-02-26 RX ORDER — BUPROPION HYDROCHLORIDE 300 MG/1
TABLET ORAL
Qty: 90 TABLET | Refills: 0 | Status: SHIPPED | OUTPATIENT
Start: 2024-02-26

## 2024-02-26 RX ORDER — VENLAFAXINE HYDROCHLORIDE 75 MG/1
CAPSULE, EXTENDED RELEASE ORAL
Qty: 90 CAPSULE | Refills: 0 | Status: SHIPPED | OUTPATIENT
Start: 2024-02-26

## 2024-03-05 ENCOUNTER — OFFICE VISIT (OUTPATIENT)
Dept: FAMILY MEDICINE CLINIC | Age: 49
End: 2024-03-05
Payer: COMMERCIAL

## 2024-03-05 VITALS
HEART RATE: 85 BPM | DIASTOLIC BLOOD PRESSURE: 76 MMHG | WEIGHT: 202 LBS | TEMPERATURE: 97.9 F | SYSTOLIC BLOOD PRESSURE: 118 MMHG | BODY MASS INDEX: 39.45 KG/M2 | RESPIRATION RATE: 16 BRPM | OXYGEN SATURATION: 98 %

## 2024-03-05 DIAGNOSIS — L30.9 DERMATITIS: Primary | ICD-10-CM

## 2024-03-05 DIAGNOSIS — R05.8 POST-VIRAL COUGH SYNDROME: ICD-10-CM

## 2024-03-05 PROCEDURE — 99213 OFFICE O/P EST LOW 20 MIN: CPT | Performed by: NURSE PRACTITIONER

## 2024-03-05 RX ORDER — BENZONATATE 100 MG/1
100 CAPSULE ORAL 3 TIMES DAILY PRN
Qty: 30 CAPSULE | Refills: 0 | Status: SHIPPED | OUTPATIENT
Start: 2024-03-05 | End: 2024-03-15

## 2024-03-05 RX ORDER — PREDNISONE 20 MG/1
20 TABLET ORAL 2 TIMES DAILY
Qty: 10 TABLET | Refills: 0 | Status: SHIPPED | OUTPATIENT
Start: 2024-03-05 | End: 2024-03-10

## 2024-03-05 ASSESSMENT — ENCOUNTER SYMPTOMS
EYE DISCHARGE: 0
WHEEZING: 0
CHEST TIGHTNESS: 0
NAUSEA: 0
COUGH: 1
SHORTNESS OF BREATH: 0
FACIAL SWELLING: 0
VOMITING: 0
EYE REDNESS: 0

## 2024-03-05 NOTE — PROGRESS NOTES
medications.  All patient questions answered. Pt verbalized understanding.  Instructed to continue current medications, diet and exercise.  Patient agreed with treatment plan. Follow up as directed.     Electronically signed by JADE ALLEN CNP on 3/5/2024 at 12:37 PM

## 2024-03-15 ENCOUNTER — TELEPHONE (OUTPATIENT)
Dept: PRIMARY CARE CLINIC | Age: 49
End: 2024-03-15

## 2024-03-15 NOTE — TELEPHONE ENCOUNTER
Spoke w/ Cologuard support regarding patient's cologuard kit. Representative stated they spoke w/ the patient and patient declined testing due to her wanting to get a colonoscopy. Writer provided verbal to cancel cologuard. Representative verbalized understanding

## 2024-03-25 ENCOUNTER — PATIENT MESSAGE (OUTPATIENT)
Dept: PRIMARY CARE CLINIC | Age: 49
End: 2024-03-25

## 2024-03-25 NOTE — TELEPHONE ENCOUNTER
From: Daiana Gutiérrez  To: Millie Pan  Sent: 3/25/2024 1:59 PM EDT  Subject: Tirzepatide    Hello,     I had tried a weight loss drug for a week to help get a start, but did not continue taking it because when I exercised I felt like my heart was going to explode. Can I try a different kind? My friend tried tirzepatde and it has worked well.    Thank you,   Daiana

## 2024-05-23 DIAGNOSIS — F41.9 ANXIETY: ICD-10-CM

## 2024-05-23 DIAGNOSIS — F32.A MILD DEPRESSION: ICD-10-CM

## 2024-05-23 DIAGNOSIS — E03.9 ACQUIRED HYPOTHYROIDISM: ICD-10-CM

## 2024-05-23 RX ORDER — VENLAFAXINE HYDROCHLORIDE 75 MG/1
CAPSULE, EXTENDED RELEASE ORAL
Qty: 90 CAPSULE | Refills: 0 | Status: SHIPPED | OUTPATIENT
Start: 2024-05-23

## 2024-05-23 RX ORDER — BUPROPION HYDROCHLORIDE 300 MG/1
TABLET ORAL
Qty: 90 TABLET | Refills: 0 | Status: SHIPPED | OUTPATIENT
Start: 2024-05-23

## 2024-05-23 RX ORDER — LEVOTHYROXINE SODIUM 0.05 MG/1
TABLET ORAL
Qty: 90 TABLET | Refills: 0 | Status: SHIPPED | OUTPATIENT
Start: 2024-05-23

## 2024-07-03 ENCOUNTER — HOSPITAL ENCOUNTER (OUTPATIENT)
Age: 49
Setting detail: SPECIMEN
Discharge: HOME | End: 2024-07-03

## 2024-07-03 ENCOUNTER — OFFICE VISIT (OUTPATIENT)
Dept: PRIMARY CARE CLINIC | Age: 49
End: 2024-07-03
Payer: COMMERCIAL

## 2024-07-03 VITALS
SYSTOLIC BLOOD PRESSURE: 104 MMHG | DIASTOLIC BLOOD PRESSURE: 76 MMHG | WEIGHT: 197.6 LBS | OXYGEN SATURATION: 97 % | BODY MASS INDEX: 38.79 KG/M2 | HEIGHT: 60 IN | RESPIRATION RATE: 14 BRPM | HEART RATE: 74 BPM

## 2024-07-03 DIAGNOSIS — F32.A MILD DEPRESSION: ICD-10-CM

## 2024-07-03 DIAGNOSIS — E78.2 MIXED HYPERLIPIDEMIA: ICD-10-CM

## 2024-07-03 DIAGNOSIS — E66.01 SEVERE OBESITY (BMI 35.0-39.9) WITH COMORBIDITY (HCC): ICD-10-CM

## 2024-07-03 DIAGNOSIS — Z00.00 ENCOUNTER FOR WELL ADULT EXAM WITHOUT ABNORMAL FINDINGS: Primary | ICD-10-CM

## 2024-07-03 DIAGNOSIS — E03.9 ACQUIRED HYPOTHYROIDISM: ICD-10-CM

## 2024-07-03 DIAGNOSIS — F41.9 ANXIETY: ICD-10-CM

## 2024-07-03 LAB
ALBUMIN SERPL-MCNC: 3.9 G/DL (ref 3.5–5.2)
ALBUMIN/GLOB SERPL: 2 {RATIO} (ref 1–2.5)
ALP SERPL-CCNC: 75 U/L (ref 35–104)
ALT SERPL-CCNC: 11 U/L (ref 10–35)
ANION GAP SERPL CALCULATED.3IONS-SCNC: 18 MMOL/L (ref 9–16)
AST SERPL-CCNC: 19 U/L (ref 10–35)
BILIRUB SERPL-MCNC: 0.2 MG/DL (ref 0–1.2)
BUN SERPL-MCNC: 17 MG/DL (ref 6–20)
CALCIUM SERPL-MCNC: 8.8 MG/DL (ref 8.6–10.4)
CHLORIDE SERPL-SCNC: 104 MMOL/L (ref 98–107)
CO2 SERPL-SCNC: 16 MMOL/L (ref 20–31)
CREAT SERPL-MCNC: 0.9 MG/DL (ref 0.5–0.9)
ERYTHROCYTE [DISTWIDTH] IN BLOOD BY AUTOMATED COUNT: 12.2 % (ref 11.8–14.4)
GFR, ESTIMATED: 82 ML/MIN/1.73M2
GLUCOSE SERPL-MCNC: 93 MG/DL (ref 74–99)
HCT VFR BLD AUTO: 43.5 % (ref 36.3–47.1)
HGB BLD-MCNC: 14.4 G/DL (ref 11.9–15.1)
MCH RBC QN AUTO: 30.4 PG (ref 25.2–33.5)
MCHC RBC AUTO-ENTMCNC: 33.1 G/DL (ref 28.4–34.8)
MCV RBC AUTO: 92 FL (ref 82.6–102.9)
NRBC BLD-RTO: 0 PER 100 WBC
PLATELET # BLD AUTO: 301 K/UL (ref 138–453)
PMV BLD AUTO: 9.7 FL (ref 8.1–13.5)
POTASSIUM SERPL-SCNC: 4.3 MMOL/L (ref 3.7–5.3)
PROT SERPL-MCNC: 6.3 G/DL (ref 6.6–8.7)
RBC # BLD AUTO: 4.73 M/UL (ref 3.95–5.11)
SODIUM SERPL-SCNC: 138 MMOL/L (ref 136–145)
T4 FREE SERPL-MCNC: 1.2 NG/DL (ref 0.92–1.68)
TSH SERPL DL<=0.05 MIU/L-ACNC: 0.48 UIU/ML (ref 0.27–4.2)
WBC OTHER # BLD: 6.5 K/UL (ref 3.5–11.3)

## 2024-07-03 PROCEDURE — 99396 PREV VISIT EST AGE 40-64: CPT | Performed by: NURSE PRACTITIONER

## 2024-07-03 SDOH — ECONOMIC STABILITY: FOOD INSECURITY: WITHIN THE PAST 12 MONTHS, THE FOOD YOU BOUGHT JUST DIDN'T LAST AND YOU DIDN'T HAVE MONEY TO GET MORE.: NEVER TRUE

## 2024-07-03 SDOH — ECONOMIC STABILITY: FOOD INSECURITY: WITHIN THE PAST 12 MONTHS, YOU WORRIED THAT YOUR FOOD WOULD RUN OUT BEFORE YOU GOT MONEY TO BUY MORE.: NEVER TRUE

## 2024-07-03 SDOH — ECONOMIC STABILITY: INCOME INSECURITY: HOW HARD IS IT FOR YOU TO PAY FOR THE VERY BASICS LIKE FOOD, HOUSING, MEDICAL CARE, AND HEATING?: NOT HARD AT ALL

## 2024-07-03 ASSESSMENT — ENCOUNTER SYMPTOMS
ABDOMINAL PAIN: 0
SINUS PAIN: 0
EYE DISCHARGE: 0
EYE REDNESS: 0
NAUSEA: 0
DIARRHEA: 0
SORE THROAT: 0
COUGH: 0
CHEST TIGHTNESS: 0
SINUS PRESSURE: 0
TROUBLE SWALLOWING: 0
VOMITING: 0
WHEEZING: 0
EYE ITCHING: 0
SHORTNESS OF BREATH: 0

## 2024-07-03 ASSESSMENT — PATIENT HEALTH QUESTIONNAIRE - PHQ9
1. LITTLE INTEREST OR PLEASURE IN DOING THINGS: NOT AT ALL
10. IF YOU CHECKED OFF ANY PROBLEMS, HOW DIFFICULT HAVE THESE PROBLEMS MADE IT FOR YOU TO DO YOUR WORK, TAKE CARE OF THINGS AT HOME, OR GET ALONG WITH OTHER PEOPLE: NOT DIFFICULT AT ALL
SUM OF ALL RESPONSES TO PHQ QUESTIONS 1-9: 0
5. POOR APPETITE OR OVEREATING: NOT AT ALL
6. FEELING BAD ABOUT YOURSELF - OR THAT YOU ARE A FAILURE OR HAVE LET YOURSELF OR YOUR FAMILY DOWN: NOT AT ALL
SUM OF ALL RESPONSES TO PHQ QUESTIONS 1-9: 0
8. MOVING OR SPEAKING SO SLOWLY THAT OTHER PEOPLE COULD HAVE NOTICED. OR THE OPPOSITE, BEING SO FIGETY OR RESTLESS THAT YOU HAVE BEEN MOVING AROUND A LOT MORE THAN USUAL: NOT AT ALL
9. THOUGHTS THAT YOU WOULD BE BETTER OFF DEAD, OR OF HURTING YOURSELF: NOT AT ALL
7. TROUBLE CONCENTRATING ON THINGS, SUCH AS READING THE NEWSPAPER OR WATCHING TELEVISION: NOT AT ALL
6. FEELING BAD ABOUT YOURSELF - OR THAT YOU ARE A FAILURE OR HAVE LET YOURSELF OR YOUR FAMILY DOWN: NOT AT ALL
2. FEELING DOWN, DEPRESSED OR HOPELESS: NOT AT ALL
SUM OF ALL RESPONSES TO PHQ QUESTIONS 1-9: 0
7. TROUBLE CONCENTRATING ON THINGS, SUCH AS READING THE NEWSPAPER OR WATCHING TELEVISION: NOT AT ALL
SUM OF ALL RESPONSES TO PHQ9 QUESTIONS 1 & 2: 0
8. MOVING OR SPEAKING SO SLOWLY THAT OTHER PEOPLE COULD HAVE NOTICED. OR THE OPPOSITE - BEING SO FIDGETY OR RESTLESS THAT YOU HAVE BEEN MOVING AROUND A LOT MORE THAN USUAL: NOT AT ALL
3. TROUBLE FALLING OR STAYING ASLEEP: NOT AT ALL
9. THOUGHTS THAT YOU WOULD BE BETTER OFF DEAD, OR OF HURTING YOURSELF: NOT AT ALL
3. TROUBLE FALLING OR STAYING ASLEEP: NOT AT ALL
10. IF YOU CHECKED OFF ANY PROBLEMS, HOW DIFFICULT HAVE THESE PROBLEMS MADE IT FOR YOU TO DO YOUR WORK, TAKE CARE OF THINGS AT HOME, OR GET ALONG WITH OTHER PEOPLE: NOT DIFFICULT AT ALL
4. FEELING TIRED OR HAVING LITTLE ENERGY: NOT AT ALL
5. POOR APPETITE OR OVEREATING: NOT AT ALL
2. FEELING DOWN, DEPRESSED OR HOPELESS: NOT AT ALL
4. FEELING TIRED OR HAVING LITTLE ENERGY: NOT AT ALL
1. LITTLE INTEREST OR PLEASURE IN DOING THINGS: NOT AT ALL

## 2024-07-03 NOTE — PROGRESS NOTES
mcg daily  -     TSH; Future  -     T4, Free; Future  -     CBC; Future  -     Comprehensive Metabolic Panel; Future  3. Mild depression  -Stable  Continue with Wellbutrin 300 mg extended release daily  Continue with venlafaxine 75 mg extended release daily  4. Anxiety  -Stable  Continue with Wellbutrin and venlafaxine as stated in above plan   5. Mixed hyperlipidemia  -Risk score is low.  Continue with diet control and weight management  6. Severe obesity (BMI 35.0-39.9) with comorbidity (HCC)   -Patient is on compounded semaglutide.  She is down 5 pound so far.  She was congratulated on weight loss.  Encouragement provided.    Patient's wound to follow-up in 8 weeks.  She may return sooner if needed      Personalized Preventive Plan   Current Health Maintenance Status  Immunization History   Administered Date(s) Administered    COVID-19, MODERNA BLUE border, Primary or Immunocompromised, (age 12y+), IM, 100 mcg/0.5mL 01/11/2021, 02/08/2021    Influenza Virus Vaccine 10/31/2016, 10/04/2017, 09/27/2018, 10/23/2019, 10/09/2020, 10/14/2021    Influenza Whole 10/31/2016    Influenza, AFLURIA (age 3 yrs+), FLUZONE, (age 6 mo+), MDV, 0.5mL 10/04/2017, 09/27/2018    Influenza, FLUCELVAX, (age 6 mo+), MDCK, PF, 0.5mL 09/25/2023    Influenza, Triv, 3 Years and older, IM (Afluria (5 yrs and older) 10/14/2022    TDaP, ADACEL (age 10y-64y), BOOSTRIX (age 10y+), IM, 0.5mL 05/17/2017        Health Maintenance   Topic Date Due    Hepatitis B vaccine (1 of 3 - 3-dose series) Never done    COVID-19 Vaccine (3 - 2023-24 season) 09/01/2023    Flu vaccine (1) 08/01/2024    Depression Monitoring  07/03/2025    Breast cancer screen  10/24/2025    Diabetes screen  11/17/2025    DTaP/Tdap/Td vaccine (2 - Td or Tdap) 05/17/2027    Colorectal Cancer Screen  10/13/2027    Lipids  07/13/2028    Cervical cancer screen  09/14/2028    Hepatitis A vaccine  Aged Out    Hib vaccine  Aged Out    Polio vaccine  Aged Out    Meningococcal (ACWY)

## 2024-07-03 NOTE — PATIENT INSTRUCTIONS
Advance Care Planning     Advance Care Planning opens a door to talk about and write down your wishes before a sudden accident or illness.  Make your goals, values, and preferences known.     This puts you in the ’s seat and helps others know what matters most to you so they won’t have to guess.      Where can you learn more?    Go to [email or web address]://[email or web address]/[email or web address]-resources/[email or web address]-care-planning   to learn how to:    Name someone you trust to make healthcare decisions for you, only if you can’t. (Healthcare Power of )    Document your wishes for care if you were seriously ill and not expected to recover or are approaching end of life. (Advance Directive or Living Will)    The same page can be found using the QR code below.                Starting a Weight Loss Plan: Care Instructions  Overview     It can be a challenge to lose weight. But your doctor can help you make a weight-loss plan that meets your needs.  You don't have to make a lot of big changes at once. A better idea might be to focus on small changes and stick with them. When those changes become habit, you can add a few more changes.  Some people find it helpful to take an exercise or nutrition class. If you have questions, ask your doctor about seeing a registered dietitian or an exercise specialist. You might also think about joining a weight-loss support group.  If you're not ready to make changes right now, try to pick a date in the future. Then make an appointment with your doctor to talk about when and how you'll get started with a plan.  Follow-up care is a key part of your treatment and safety. Be sure to make and go to all appointments, and call your doctor if you are having problems. It's also a good idea to know your test results and keep a list of the medicines you take.  How can you care for yourself at home?  Set realistic goals. Many people expect to lose much more weight than is likely. A weight loss of 5% to 10% of your body

## 2024-08-25 DIAGNOSIS — F41.9 ANXIETY: ICD-10-CM

## 2024-08-25 DIAGNOSIS — F32.A MILD DEPRESSION: ICD-10-CM

## 2024-08-26 RX ORDER — BUPROPION HYDROCHLORIDE 300 MG/1
TABLET ORAL
Qty: 90 TABLET | Refills: 0 | Status: SHIPPED | OUTPATIENT
Start: 2024-08-26

## 2024-08-26 RX ORDER — VENLAFAXINE HYDROCHLORIDE 75 MG/1
CAPSULE, EXTENDED RELEASE ORAL
Qty: 90 CAPSULE | Refills: 0 | Status: SHIPPED | OUTPATIENT
Start: 2024-08-26

## 2024-08-29 ENCOUNTER — OFFICE VISIT (OUTPATIENT)
Dept: PRIMARY CARE CLINIC | Age: 49
End: 2024-08-29
Payer: COMMERCIAL

## 2024-08-29 VITALS
WEIGHT: 184.8 LBS | HEIGHT: 60 IN | RESPIRATION RATE: 15 BRPM | OXYGEN SATURATION: 98 % | HEART RATE: 72 BPM | BODY MASS INDEX: 36.28 KG/M2 | SYSTOLIC BLOOD PRESSURE: 118 MMHG | DIASTOLIC BLOOD PRESSURE: 78 MMHG

## 2024-08-29 DIAGNOSIS — E66.01 SEVERE OBESITY (BMI 35.0-39.9) WITH COMORBIDITY (HCC): ICD-10-CM

## 2024-08-29 DIAGNOSIS — Z71.3 DIETARY COUNSELING: Primary | ICD-10-CM

## 2024-08-29 PROCEDURE — 99213 OFFICE O/P EST LOW 20 MIN: CPT | Performed by: NURSE PRACTITIONER

## 2024-08-29 SDOH — ECONOMIC STABILITY: FOOD INSECURITY: WITHIN THE PAST 12 MONTHS, THE FOOD YOU BOUGHT JUST DIDN'T LAST AND YOU DIDN'T HAVE MONEY TO GET MORE.: NEVER TRUE

## 2024-08-29 SDOH — ECONOMIC STABILITY: FOOD INSECURITY: WITHIN THE PAST 12 MONTHS, YOU WORRIED THAT YOUR FOOD WOULD RUN OUT BEFORE YOU GOT MONEY TO BUY MORE.: NEVER TRUE

## 2024-08-29 SDOH — ECONOMIC STABILITY: INCOME INSECURITY: HOW HARD IS IT FOR YOU TO PAY FOR THE VERY BASICS LIKE FOOD, HOUSING, MEDICAL CARE, AND HEATING?: NOT HARD AT ALL

## 2024-08-29 ASSESSMENT — ENCOUNTER SYMPTOMS
SORE THROAT: 0
SINUS PRESSURE: 0
SHORTNESS OF BREATH: 0
CHEST TIGHTNESS: 0
EYE ITCHING: 0
WHEEZING: 0
EYE DISCHARGE: 0
DIARRHEA: 0
EYE REDNESS: 0
NAUSEA: 1
VOMITING: 0
TROUBLE SWALLOWING: 0
COUGH: 0
SINUS PAIN: 0
ABDOMINAL PAIN: 0

## 2024-08-29 ASSESSMENT — PATIENT HEALTH QUESTIONNAIRE - PHQ9
5. POOR APPETITE OR OVEREATING: NOT AT ALL
6. FEELING BAD ABOUT YOURSELF - OR THAT YOU ARE A FAILURE OR HAVE LET YOURSELF OR YOUR FAMILY DOWN: NOT AT ALL
3. TROUBLE FALLING OR STAYING ASLEEP: NOT AT ALL
1. LITTLE INTEREST OR PLEASURE IN DOING THINGS: NOT AT ALL
4. FEELING TIRED OR HAVING LITTLE ENERGY: NOT AT ALL
SUM OF ALL RESPONSES TO PHQ QUESTIONS 1-9: 0
10. IF YOU CHECKED OFF ANY PROBLEMS, HOW DIFFICULT HAVE THESE PROBLEMS MADE IT FOR YOU TO DO YOUR WORK, TAKE CARE OF THINGS AT HOME, OR GET ALONG WITH OTHER PEOPLE: NOT DIFFICULT AT ALL
8. MOVING OR SPEAKING SO SLOWLY THAT OTHER PEOPLE COULD HAVE NOTICED. OR THE OPPOSITE, BEING SO FIGETY OR RESTLESS THAT YOU HAVE BEEN MOVING AROUND A LOT MORE THAN USUAL: NOT AT ALL
2. FEELING DOWN, DEPRESSED OR HOPELESS: NOT AT ALL
7. TROUBLE CONCENTRATING ON THINGS, SUCH AS READING THE NEWSPAPER OR WATCHING TELEVISION: NOT AT ALL
SUM OF ALL RESPONSES TO PHQ QUESTIONS 1-9: 0
SUM OF ALL RESPONSES TO PHQ9 QUESTIONS 1 & 2: 0
9. THOUGHTS THAT YOU WOULD BE BETTER OFF DEAD, OR OF HURTING YOURSELF: NOT AT ALL

## 2024-08-29 NOTE — PROGRESS NOTES
[facility]  [facility] PRIMARY CARE  [address] [place] [address] [address]  [address]  [facility] [address]  Dept: [phone]  Dept Fax: [phone]    Lois Gutiérrez is a 49 y.o. female who presents today for her medical conditions/complaintsas noted below.  Lois Gutiérrez is c/o of Weight Management (8 weeks FU )    HPI:     Patient presents for follow-up  Blood pressure stable  Weight is down 13 pounds    Patient presents for weight management follow-up  On compound semaglutide. Down total of 19. Mild nausea at times.   She is exercising. Watches diet.     No concerns        Past Medical History:   Diagnosis Date    Abnormal Pap smear 2005    Anxiety     GDM (gestational diabetes mellitus) 2007    Hyperthyroidism     Hypothyroidism     Menopausal symptoms     Postpartum depression       Past Surgical History:   Procedure Laterality Date    CERVIX BIOPSY      COLPOSCOPY      INTRAUTERINE DEVICE INSERTION  2013    Mirena    INTRAUTERINE DEVICE INSERTION  07/10/2018    Mirena     INTRAUTERINE DEVICE REMOVAL  07/10/2018    USPA         Family History   Problem Relation Age of Onset    High Cholesterol Mother     Miscarriages / Stillbirths Mother     Arthritis Mother     Other Father         mass in his colon     Diabetes Father     Cancer Father         colon    Heart Disease Maternal Grandmother     Heart Disease Maternal Grandfather     Cancer Maternal Grandfather         prostate/lung    Other Paternal Grandfather         aneurysm    Stroke Paternal Grandfather     Heart Disease Maternal Aunt     Coronary Art Dis Maternal Aunt     Obesity Maternal Aunt     Breast Cancer Maternal Aunt         over 50       Social History     Tobacco Use    Smoking status: Former     Current packs/day: 0.00     Average packs/day: 0.5 packs/day for 2.0 years (1.0 ttl pk-yrs)     Types: Cigarettes     Start date: 2000     Quit date: 2002     Years since quittin.2    Smokeless tobacco: Never  Dietary counseling        2. Severe obesity (BMI 35.0-39.9) with comorbidity (HCC)            :      Return in about 8 weeks (around 10/24/2024) for weight follow up.  1-2.  Dietary counseling obesity  Patient's weight is down 13 pounds since last visit and 19 pounds overall.  Doing well on compounded semaglutide.  She is up to 0.6 mg subcu weekly.  She is exercising regularly.  She is conscious and watching her diet.  She was congratulated on her weight loss.  Emotional support provided.  Okay to follow-up in 2 months    No orders of the defined types were placed in this encounter.      Patient given educational materials - seepatient instructions.  Discussed use, benefit, and side effects of prescribed medications.All patient questions answered.  Pt voiced understanding. Reviewed health maintenance.Instructed to continue current medications, diet and exercise.  Patient agreedwith treatment plan. Follow up as directed.      Electronically signed by JADE Gentile CNP on 8/29/2024at 9:09 AM

## 2024-09-09 LAB
CHOLEST SERPL-MCNC: 210 MG/DL (ref 0–199)
CHOLESTEROL/HDL RATIO: 4
GLUCOSE SERPL-MCNC: 100 MG/DL (ref 74–99)
HDLC SERPL-MCNC: 48 MG/DL
LDLC SERPL CALC-MCNC: 139 MG/DL (ref 0–100)
PATIENT FASTING?: YES
TRIGL SERPL-MCNC: 117 MG/DL
VLDLC SERPL CALC-MCNC: 23 MG/DL

## 2024-09-13 ENCOUNTER — PATIENT MESSAGE (OUTPATIENT)
Dept: PRIMARY CARE CLINIC | Age: 49
End: 2024-09-13

## 2024-10-10 DIAGNOSIS — E03.9 ACQUIRED HYPOTHYROIDISM: ICD-10-CM

## 2024-10-11 RX ORDER — LEVOTHYROXINE SODIUM 50 UG/1
TABLET ORAL
Qty: 90 TABLET | Refills: 0 | Status: SHIPPED | OUTPATIENT
Start: 2024-10-11

## 2024-10-17 ENCOUNTER — PATIENT MESSAGE (OUTPATIENT)
Dept: PRIMARY CARE CLINIC | Age: 49
End: 2024-10-17

## 2024-10-28 ENCOUNTER — OFFICE VISIT (OUTPATIENT)
Dept: PRIMARY CARE CLINIC | Age: 49
End: 2024-10-28
Payer: COMMERCIAL

## 2024-10-28 VITALS
WEIGHT: 183 LBS | DIASTOLIC BLOOD PRESSURE: 78 MMHG | BODY MASS INDEX: 35.74 KG/M2 | RESPIRATION RATE: 14 BRPM | OXYGEN SATURATION: 99 % | SYSTOLIC BLOOD PRESSURE: 114 MMHG | HEART RATE: 71 BPM

## 2024-10-28 DIAGNOSIS — Z71.3 DIETARY COUNSELING: ICD-10-CM

## 2024-10-28 DIAGNOSIS — E66.01 SEVERE OBESITY (BMI 35.0-39.9) WITH COMORBIDITY: ICD-10-CM

## 2024-10-28 DIAGNOSIS — E78.2 MIXED HYPERLIPIDEMIA: Primary | ICD-10-CM

## 2024-10-28 PROCEDURE — 99214 OFFICE O/P EST MOD 30 MIN: CPT | Performed by: NURSE PRACTITIONER

## 2024-10-28 ASSESSMENT — ENCOUNTER SYMPTOMS
SORE THROAT: 0
EYE ITCHING: 0
ABDOMINAL PAIN: 0
CHEST TIGHTNESS: 0
WHEEZING: 0
NAUSEA: 0
SINUS PAIN: 0
COUGH: 0
TROUBLE SWALLOWING: 0
DIARRHEA: 0
EYE REDNESS: 0
SHORTNESS OF BREATH: 0
EYE DISCHARGE: 0
SINUS PRESSURE: 0
VOMITING: 0

## 2024-10-28 NOTE — PROGRESS NOTES
screen  Discontinued    HIV screen  Discontinued       :     Review of Systems   Constitutional:  Negative for chills, fatigue and fever.   HENT:  Negative for ear discharge, ear pain, sinus pressure, sinus pain, sore throat and trouble swallowing.    Eyes:  Negative for discharge, redness and itching.   Respiratory:  Negative for cough, chest tightness, shortness of breath and wheezing.    Cardiovascular:  Negative for chest pain.   Gastrointestinal:  Negative for abdominal pain, diarrhea, nausea and vomiting.   Genitourinary:  Negative for difficulty urinating.   Musculoskeletal:  Negative for arthralgias and neck pain.   Skin:  Negative for rash.   Neurological:  Negative for dizziness, weakness, light-headedness and headaches.   All other systems reviewed and are negative.      Objective:     Physical Exam  Constitutional:       General: She is not in acute distress.     Appearance: Normal appearance. She is normal weight. She is not ill-appearing.   HENT:      Head: Normocephalic and atraumatic.      Nose: Nose normal. No congestion or rhinorrhea.      Mouth/Throat:      Mouth: Mucous membranes are moist.      Pharynx: Oropharynx is clear. No oropharyngeal exudate or posterior oropharyngeal erythema.   Eyes:      Extraocular Movements: Extraocular movements intact.      Conjunctiva/sclera: Conjunctivae normal.      Pupils: Pupils are equal, round, and reactive to light.   Cardiovascular:      Rate and Rhythm: Normal rate and regular rhythm.      Pulses: Normal pulses.      Heart sounds: Normal heart sounds. No murmur heard.  Pulmonary:      Effort: Pulmonary effort is normal. No respiratory distress.      Breath sounds: Normal breath sounds. No wheezing.   Abdominal:      General: Abdomen is flat. Bowel sounds are normal. There is no distension.      Palpations: Abdomen is soft.      Tenderness: There is no abdominal tenderness. There is no guarding.   Musculoskeletal:         General: Normal range of motion.

## 2024-11-19 ENCOUNTER — HOSPITAL ENCOUNTER (OUTPATIENT)
Dept: MAMMOGRAPHY | Age: 49
Discharge: HOME | End: 2024-11-21
Payer: COMMERCIAL

## 2024-11-19 DIAGNOSIS — Z12.31 ENCOUNTER FOR SCREENING MAMMOGRAM FOR BREAST CANCER: ICD-10-CM

## 2024-11-19 PROCEDURE — 77063 BREAST TOMOSYNTHESIS BI: CPT

## 2024-11-23 DIAGNOSIS — F32.A MILD DEPRESSION: ICD-10-CM

## 2024-11-23 DIAGNOSIS — F41.9 ANXIETY: ICD-10-CM

## 2024-11-24 RX ORDER — VENLAFAXINE HYDROCHLORIDE 75 MG/1
CAPSULE, EXTENDED RELEASE ORAL
Qty: 90 CAPSULE | Refills: 0 | Status: SHIPPED | OUTPATIENT
Start: 2024-11-24

## 2024-11-24 RX ORDER — BUPROPION HYDROCHLORIDE 300 MG/1
TABLET ORAL
Qty: 90 TABLET | Refills: 0 | Status: SHIPPED | OUTPATIENT
Start: 2024-11-24

## 2024-11-25 ENCOUNTER — OFFICE VISIT (OUTPATIENT)
Dept: OBGYN CLINIC | Age: 49
End: 2024-11-25
Payer: COMMERCIAL

## 2024-11-25 VITALS
BODY MASS INDEX: 36.12 KG/M2 | WEIGHT: 184 LBS | SYSTOLIC BLOOD PRESSURE: 120 MMHG | HEIGHT: 60 IN | DIASTOLIC BLOOD PRESSURE: 72 MMHG

## 2024-11-25 DIAGNOSIS — Z97.5 BREAKTHROUGH BLEEDING ASSOCIATED WITH INTRAUTERINE DEVICE (IUD): ICD-10-CM

## 2024-11-25 DIAGNOSIS — N93.9 ABNORMAL UTERINE BLEEDING (AUB): ICD-10-CM

## 2024-11-25 DIAGNOSIS — Z97.5 IUD (INTRAUTERINE DEVICE) IN PLACE: ICD-10-CM

## 2024-11-25 DIAGNOSIS — N92.1 MENORRHAGIA WITH IRREGULAR CYCLE: Primary | ICD-10-CM

## 2024-11-25 DIAGNOSIS — N92.1 BREAKTHROUGH BLEEDING ASSOCIATED WITH INTRAUTERINE DEVICE (IUD): ICD-10-CM

## 2024-11-25 PROCEDURE — 99213 OFFICE O/P EST LOW 20 MIN: CPT | Performed by: OBSTETRICS & GYNECOLOGY

## 2024-11-26 PROBLEM — N93.9 ABNORMAL UTERINE BLEEDING (AUB): Status: ACTIVE | Noted: 2024-11-26

## 2024-11-26 PROBLEM — N92.1 MENORRHAGIA WITH IRREGULAR CYCLE: Status: ACTIVE | Noted: 2024-11-26

## 2024-11-26 NOTE — PROGRESS NOTES
Lois Gutiérrez  2024    YOB: 1975    The patient was seen today. She is here regarding heavy and irregular menstrual bleeding with IUD in place. Her bowels are regular and she is voiding without difficulty.     HPI:  Lois Gutiérrez is a 49 y.o. female      IUD placed 2018  She states that over the past 2-3 months she has had change in her bleeding.    She has history of abnormal and heavy menstrual bleeding  Pap 2023  GI sees Dr. Ferro    She states for the last 2-3 months she has been bleeding for 1.5 weeks and have 1-1.5 weeks off from bleeding.  This is a change in her bleeding and we discussed FDA approval for 8 years for contraception, but it may not be working as well for HMB/AUB at this time.      Denies dyspareunia, denies bowel or bladder concerns.    She denies vaginal discharge and is declining exam today.    Discussed all options with R/B/A of each in detail.  All questions answered and at this time she is considering IUD removal and reinsertion vs. Possible endometrial ablation.      OB History    Para Term  AB Living   2 2 2 0 0 2   SAB IAB Ectopic Molar Multiple Live Births   0 0 0 0 0 2      # Outcome Date GA Lbr Salty/2nd Weight Sex Type Anes PTL Lv   2 Term 2007 39w0d  2.722 kg (6 lb) F Vag-Spont EPI N DADA   1 Term 2003 38w0d  2.863 kg (6 lb 5 oz) F Vag-Spont EPI N DADA      Complications: GDM (gestational diabetes mellitus)       Past Medical History:   Diagnosis Date    Abnormal Pap smear 2005    Anxiety     GDM (gestational diabetes mellitus) 2007    Hyperthyroidism     Hypothyroidism     Menopausal symptoms     Postpartum depression        Past Surgical History:   Procedure Laterality Date    CERVIX BIOPSY      COLPOSCOPY      INTRAUTERINE DEVICE INSERTION  2013    Mirena    INTRAUTERINE DEVICE INSERTION  07/10/2018    Mirena     INTRAUTERINE DEVICE REMOVAL  07/10/2018    SUPA         Family History   Problem Relation

## 2025-01-06 DIAGNOSIS — E03.9 ACQUIRED HYPOTHYROIDISM: ICD-10-CM

## 2025-01-06 RX ORDER — LEVOTHYROXINE SODIUM 50 UG/1
TABLET ORAL
Qty: 90 TABLET | Refills: 0 | Status: SHIPPED | OUTPATIENT
Start: 2025-01-06

## 2025-01-09 ENCOUNTER — OFFICE VISIT (OUTPATIENT)
Dept: PRIMARY CARE CLINIC | Age: 50
End: 2025-01-09

## 2025-01-09 VITALS
RESPIRATION RATE: 14 BRPM | WEIGHT: 179.8 LBS | DIASTOLIC BLOOD PRESSURE: 60 MMHG | SYSTOLIC BLOOD PRESSURE: 118 MMHG | HEART RATE: 82 BPM | OXYGEN SATURATION: 98 % | BODY MASS INDEX: 35.11 KG/M2

## 2025-01-09 DIAGNOSIS — E66.09 CLASS 2 OBESITY DUE TO EXCESS CALORIES WITHOUT SERIOUS COMORBIDITY WITH BODY MASS INDEX (BMI) OF 39.0 TO 39.9 IN ADULT: ICD-10-CM

## 2025-01-09 DIAGNOSIS — E66.812 CLASS 2 OBESITY DUE TO EXCESS CALORIES WITHOUT SERIOUS COMORBIDITY WITH BODY MASS INDEX (BMI) OF 39.0 TO 39.9 IN ADULT: ICD-10-CM

## 2025-01-09 DIAGNOSIS — Z71.3 DIETARY COUNSELING: Primary | ICD-10-CM

## 2025-01-09 SDOH — ECONOMIC STABILITY: FOOD INSECURITY: WITHIN THE PAST 12 MONTHS, THE FOOD YOU BOUGHT JUST DIDN'T LAST AND YOU DIDN'T HAVE MONEY TO GET MORE.: NEVER TRUE

## 2025-01-09 SDOH — ECONOMIC STABILITY: FOOD INSECURITY: WITHIN THE PAST 12 MONTHS, YOU WORRIED THAT YOUR FOOD WOULD RUN OUT BEFORE YOU GOT MONEY TO BUY MORE.: NEVER TRUE

## 2025-01-09 ASSESSMENT — ENCOUNTER SYMPTOMS
EYE DISCHARGE: 0
WHEEZING: 0
SINUS PAIN: 0
ABDOMINAL PAIN: 0
TROUBLE SWALLOWING: 0
EYE ITCHING: 0
SHORTNESS OF BREATH: 0
VOMITING: 0
SINUS PRESSURE: 0
DIARRHEA: 0
NAUSEA: 0
SORE THROAT: 0
CHEST TIGHTNESS: 0
COUGH: 0
EYE REDNESS: 0

## 2025-01-09 ASSESSMENT — PATIENT HEALTH QUESTIONNAIRE - PHQ9
6. FEELING BAD ABOUT YOURSELF - OR THAT YOU ARE A FAILURE OR HAVE LET YOURSELF OR YOUR FAMILY DOWN: NOT AT ALL
SUM OF ALL RESPONSES TO PHQ QUESTIONS 1-9: 0
8. MOVING OR SPEAKING SO SLOWLY THAT OTHER PEOPLE COULD HAVE NOTICED. OR THE OPPOSITE, BEING SO FIGETY OR RESTLESS THAT YOU HAVE BEEN MOVING AROUND A LOT MORE THAN USUAL: NOT AT ALL
9. THOUGHTS THAT YOU WOULD BE BETTER OFF DEAD, OR OF HURTING YOURSELF: NOT AT ALL
SUM OF ALL RESPONSES TO PHQ QUESTIONS 1-9: 0
2. FEELING DOWN, DEPRESSED OR HOPELESS: NOT AT ALL
SUM OF ALL RESPONSES TO PHQ QUESTIONS 1-9: 0
4. FEELING TIRED OR HAVING LITTLE ENERGY: NOT AT ALL
SUM OF ALL RESPONSES TO PHQ QUESTIONS 1-9: 0
7. TROUBLE CONCENTRATING ON THINGS, SUCH AS READING THE NEWSPAPER OR WATCHING TELEVISION: NOT AT ALL
1. LITTLE INTEREST OR PLEASURE IN DOING THINGS: NOT AT ALL
SUM OF ALL RESPONSES TO PHQ9 QUESTIONS 1 & 2: 0
10. IF YOU CHECKED OFF ANY PROBLEMS, HOW DIFFICULT HAVE THESE PROBLEMS MADE IT FOR YOU TO DO YOUR WORK, TAKE CARE OF THINGS AT HOME, OR GET ALONG WITH OTHER PEOPLE: NOT DIFFICULT AT ALL
5. POOR APPETITE OR OVEREATING: NOT AT ALL
3. TROUBLE FALLING OR STAYING ASLEEP: NOT AT ALL

## 2025-01-09 NOTE — PROGRESS NOTES
[facility]  [facility] PRIMARY CARE  [address] [place] [address]  [address]  [facility] [address]  Dept: [phone]  Dept Fax: [phone]    Lois Gutiérrez is a 50 y.o. female who presentstoday for her medical conditions/complaints as noted below.  Lois Gutiérrez is c/o of  Chief Complaint   Patient presents with    Weight Management         HPI:     History of Present Illness  The patient presents for weight management.    She has been experiencing constipation, with her last bowel movement occurring three days prior to the visit. She is currently menstruating and anticipates a potential increase in her weight due to these factors. Despite this, she expresses satisfaction with her weight loss progress, particularly noting no weight gain during the holiday season. She maintains an active lifestyle, engaging in tennis and weightlifting exercises. However, she acknowledges a need to increase her vegetable intake. She is currently on a regimen of semaglutide 1.2, which she reports as being effective. She has one dose remaining and plans to refill her prescription.    MEDICATIONS  semaglutide      Hemoglobin A1C (%)   Date Value   11/17/2022 5.3   03/09/2022 5.2   09/30/2021 5.0             ( goal A1C is < 7)   No components found for: \"LABMICR\"  No components found for: \"LDLCHOLESTEROL\", \"LDLCALC\"    (goal LDL is <100)   AST (U/L)   Date Value   07/03/2024 19     ALT (U/L)   Date Value   07/03/2024 11     BUN (mg/dL)   Date Value   07/03/2024 17     BP Readings from Last 3 Encounters:   01/09/25 118/60   11/25/24 120/72   10/28/24 114/78          ([id number])    Past Medical History:   Diagnosis Date    Abnormal Pap smear 01/01/2005    Anxiety     GDM (gestational diabetes mellitus) 01/01/2007    Hyperthyroidism     Hypothyroidism     Menopausal symptoms     Postpartum depression       Past Surgical History:   Procedure Laterality Date    CERVIX BIOPSY      COLPOSCOPY      INTRAUTERINE DEVICE

## 2025-02-10 ENCOUNTER — PROCEDURE VISIT (OUTPATIENT)
Dept: OBGYN CLINIC | Age: 50
End: 2025-02-10

## 2025-02-10 ENCOUNTER — HOSPITAL ENCOUNTER (OUTPATIENT)
Age: 50
Setting detail: SPECIMEN
Discharge: HOME | End: 2025-02-10

## 2025-02-10 VITALS
DIASTOLIC BLOOD PRESSURE: 62 MMHG | SYSTOLIC BLOOD PRESSURE: 120 MMHG | WEIGHT: 175 LBS | HEIGHT: 60 IN | BODY MASS INDEX: 34.36 KG/M2

## 2025-02-10 DIAGNOSIS — N93.9 ABNORMAL UTERINE BLEEDING (AUB): Primary | ICD-10-CM

## 2025-02-10 DIAGNOSIS — N92.1 MENORRHAGIA WITH IRREGULAR CYCLE: ICD-10-CM

## 2025-02-10 DIAGNOSIS — Z12.4 CERVICAL CANCER SCREENING: ICD-10-CM

## 2025-02-11 LAB
CANDIDA SPECIES: NEGATIVE
GARDNERELLA VAGINALIS: NEGATIVE
SOURCE: NORMAL
TRICHOMONAS: NEGATIVE

## 2025-02-11 NOTE — PROGRESS NOTES
[facility]  Pap Test Imaging System.  The Pap smear is a screening test primarily for squamous epithelial  lesions, which is subject to both false negative and false positive  results. Your patient should be reminded to consult you immediately if  she experiences any suspicious signs or symptoms, regardless of her  Pap smear result.  GYNECOLOGIC CYTOLOGY REPORT    Patient Name: BRITTANY MARTIN  [facility] Rec: [id number]  [facility]  CONSULTING PATHOLOGISTS [facility]  ANATOMIC PATHOLOGY  [address] [place] [address].  [place], [place] [address] [phone]  Fax: [phone]     ]      Blood pressure 120/62, height 1.524 m (5'), weight 79.4 kg (175 lb), not currently breastfeeding.    Chaperone for Intimate Exam  Chaperone was offered and accepted as part of the rooming process.  Chaperone: Yesi Frank    A time out was called by the Chaperone listed above while ALL participants were quiet and attentive.  The procedure to be completed was confirmed, with the appropriate laterality demarcated prior, if appropriate, as well as the patients name and a unique identifier, her date of birth.  All questions were answered to her satisfaction.  The consent was signed prior to the time out and all the risks were discussed in detail.     The patient was counseled on the procedure. Risks, benefits and alternatives were reviewed. The patient is aware that this is diagnostic and not curative and a second procedure may be needed. A consent was reviewed and obtained.    The patient was positioned comfortably on the exam table. After a bi-manual exam; the uterus was found to be  anteverted/midposition with a size of 8 cm. There was no adnexal masses and the bladder was smooth, non-tender and without palpable masses. A sterile speculum was placed into the vagina and the cervix was identified. Pap smear and cultures were obtained.  Cervix was stabilized with an allis clamp.  It was cleansed with betadine and the aspirator was then

## 2025-02-13 LAB — SURGICAL PATHOLOGY REPORT: NORMAL

## 2025-02-18 LAB — CYTOLOGY REPORT: NORMAL

## 2025-02-24 DIAGNOSIS — F32.A MILD DEPRESSION: ICD-10-CM

## 2025-02-24 DIAGNOSIS — F41.9 ANXIETY: ICD-10-CM

## 2025-02-24 RX ORDER — VENLAFAXINE HYDROCHLORIDE 75 MG/1
CAPSULE, EXTENDED RELEASE ORAL
Qty: 90 CAPSULE | Refills: 0 | Status: SHIPPED | OUTPATIENT
Start: 2025-02-24

## 2025-02-24 RX ORDER — BUPROPION HYDROCHLORIDE 300 MG/1
TABLET ORAL
Qty: 90 TABLET | Refills: 0 | Status: SHIPPED | OUTPATIENT
Start: 2025-02-24

## 2025-02-26 ENCOUNTER — TELEMEDICINE (OUTPATIENT)
Dept: OBGYN CLINIC | Age: 50
End: 2025-02-26
Payer: COMMERCIAL

## 2025-02-26 DIAGNOSIS — Z97.5 BREAKTHROUGH BLEEDING WITH IUD: ICD-10-CM

## 2025-02-26 DIAGNOSIS — N93.9 ABNORMAL UTERINE BLEEDING (AUB): Primary | ICD-10-CM

## 2025-02-26 DIAGNOSIS — N92.1 BREAKTHROUGH BLEEDING WITH IUD: ICD-10-CM

## 2025-02-26 DIAGNOSIS — N92.1 MENORRHAGIA WITH IRREGULAR CYCLE: ICD-10-CM

## 2025-02-26 PROCEDURE — 99213 OFFICE O/P EST LOW 20 MIN: CPT | Performed by: OBSTETRICS & GYNECOLOGY

## 2025-02-27 NOTE — PROGRESS NOTES
Lois Gutiérrez  2025    Lois Gutiérrez (:  1975) has requested an audio/video evaluation for the following concern(s):    1. Abnormal uterine bleeding (AUB)    2. Menorrhagia with irregular cycle    3. Breakthrough bleeding with IUD          TELEHEALTH EVALUATION -- Audio/Visual (During COVID-19 public health emergency)      Lois Gutiérrez is a 50 y.o. female       She was here to follow-up regarding her labs and diagnostics ordered at her last visit for the diagnosis of:    ICD-10-CM    1. Abnormal uterine bleeding (AUB)  N93.9       2. Menorrhagia with irregular cycle  N92.1       3. Breakthrough bleeding with IUD  N92.1     Z97.5         HPI 24: \"IUD placed 2018  She states that over the past 2-3 months she has had change in her bleeding.     She has history of abnormal and heavy menstrual bleeding  Pap 2023  GI sees Dr. Ferro     She states for the last 2-3 months she has been bleeding for 1.5 weeks and have 1-1.5 weeks off from bleeding.  This is a change in her bleeding and we discussed FDA approval for 8 years for contraception, but it may not be working as well for HMB/AUB at this time.       Denies dyspareunia, denies bowel or bladder concerns.    She denies vaginal discharge and is declining exam today.     Discussed all options with R/B/A of each in detail.  All questions answered and at this time she is considering IUD removal and reinsertion vs. Possible endometrial ablation.\"    She has had normal pap smear and endometrial biopsy.  She is continuing to have abnormal bleeding that is affecting her ADLs and has not been responsive to IUD.  We discussed options in detail including R/B/A of each.  All questions answered.     Past Medical History:   Diagnosis Date    Abnormal Pap smear 2005    Anxiety     GDM (gestational diabetes mellitus) 2007    Hyperthyroidism     Hypothyroidism     Menopausal symptoms     Postpartum depression      Past Surgical

## 2025-03-06 ENCOUNTER — PREP FOR PROCEDURE (OUTPATIENT)
Dept: OBGYN CLINIC | Age: 50
End: 2025-03-06
Payer: COMMERCIAL

## 2025-03-06 DIAGNOSIS — N92.3 INTERMENSTRUAL BLEEDING: ICD-10-CM

## 2025-03-06 DIAGNOSIS — Z01.818 PRE-OP TESTING: Primary | ICD-10-CM

## 2025-03-06 PROBLEM — N92.0 HEAVY MENSTRUAL BLEEDING: Status: ACTIVE | Noted: 2025-03-06

## 2025-03-06 PROCEDURE — 36415 COLL VENOUS BLD VENIPUNCTURE: CPT | Performed by: OBSTETRICS & GYNECOLOGY

## 2025-03-31 ENCOUNTER — OFFICE VISIT (OUTPATIENT)
Dept: PRIMARY CARE CLINIC | Age: 50
End: 2025-03-31
Payer: COMMERCIAL

## 2025-03-31 VITALS
BODY MASS INDEX: 34.14 KG/M2 | OXYGEN SATURATION: 99 % | DIASTOLIC BLOOD PRESSURE: 64 MMHG | RESPIRATION RATE: 14 BRPM | HEART RATE: 64 BPM | SYSTOLIC BLOOD PRESSURE: 110 MMHG | WEIGHT: 174.8 LBS

## 2025-03-31 DIAGNOSIS — Z71.3 DIETARY COUNSELING: Primary | ICD-10-CM

## 2025-03-31 DIAGNOSIS — E66.09 CLASS 2 OBESITY DUE TO EXCESS CALORIES WITHOUT SERIOUS COMORBIDITY WITH BODY MASS INDEX (BMI) OF 39.0 TO 39.9 IN ADULT: ICD-10-CM

## 2025-03-31 DIAGNOSIS — E66.812 CLASS 2 OBESITY DUE TO EXCESS CALORIES WITHOUT SERIOUS COMORBIDITY WITH BODY MASS INDEX (BMI) OF 39.0 TO 39.9 IN ADULT: ICD-10-CM

## 2025-03-31 PROCEDURE — 99213 OFFICE O/P EST LOW 20 MIN: CPT | Performed by: NURSE PRACTITIONER

## 2025-03-31 SDOH — ECONOMIC STABILITY: FOOD INSECURITY: WITHIN THE PAST 12 MONTHS, THE FOOD YOU BOUGHT JUST DIDN'T LAST AND YOU DIDN'T HAVE MONEY TO GET MORE.: NEVER TRUE

## 2025-03-31 SDOH — ECONOMIC STABILITY: FOOD INSECURITY: WITHIN THE PAST 12 MONTHS, YOU WORRIED THAT YOUR FOOD WOULD RUN OUT BEFORE YOU GOT MONEY TO BUY MORE.: NEVER TRUE

## 2025-03-31 ASSESSMENT — ENCOUNTER SYMPTOMS
ABDOMINAL PAIN: 0
SHORTNESS OF BREATH: 0
EYE ITCHING: 0
NAUSEA: 0
VOMITING: 0
EYE DISCHARGE: 0
WHEEZING: 0
SINUS PRESSURE: 0
TROUBLE SWALLOWING: 0
DIARRHEA: 0
CHEST TIGHTNESS: 0
EYE REDNESS: 0
SORE THROAT: 0
SINUS PAIN: 0
COUGH: 0

## 2025-03-31 ASSESSMENT — PATIENT HEALTH QUESTIONNAIRE - PHQ9
SUM OF ALL RESPONSES TO PHQ QUESTIONS 1-9: 0
8. MOVING OR SPEAKING SO SLOWLY THAT OTHER PEOPLE COULD HAVE NOTICED. OR THE OPPOSITE, BEING SO FIGETY OR RESTLESS THAT YOU HAVE BEEN MOVING AROUND A LOT MORE THAN USUAL: NOT AT ALL
7. TROUBLE CONCENTRATING ON THINGS, SUCH AS READING THE NEWSPAPER OR WATCHING TELEVISION: NOT AT ALL
1. LITTLE INTEREST OR PLEASURE IN DOING THINGS: NOT AT ALL
6. FEELING BAD ABOUT YOURSELF - OR THAT YOU ARE A FAILURE OR HAVE LET YOURSELF OR YOUR FAMILY DOWN: NOT AT ALL
4. FEELING TIRED OR HAVING LITTLE ENERGY: NOT AT ALL
5. POOR APPETITE OR OVEREATING: NOT AT ALL
SUM OF ALL RESPONSES TO PHQ QUESTIONS 1-9: 0
3. TROUBLE FALLING OR STAYING ASLEEP: NOT AT ALL
10. IF YOU CHECKED OFF ANY PROBLEMS, HOW DIFFICULT HAVE THESE PROBLEMS MADE IT FOR YOU TO DO YOUR WORK, TAKE CARE OF THINGS AT HOME, OR GET ALONG WITH OTHER PEOPLE: NOT DIFFICULT AT ALL
SUM OF ALL RESPONSES TO PHQ QUESTIONS 1-9: 0
9. THOUGHTS THAT YOU WOULD BE BETTER OFF DEAD, OR OF HURTING YOURSELF: NOT AT ALL
2. FEELING DOWN, DEPRESSED OR HOPELESS: NOT AT ALL
SUM OF ALL RESPONSES TO PHQ QUESTIONS 1-9: 0

## 2025-03-31 NOTE — PROGRESS NOTES
[facility]  [facility] PRIMARY CARE  [address] [place] [address] [address]  [facility] [address]  Dept: [phone]  Dept Fax: [phone]    Lois Gutiérrez is a 50 y.o. female who presentstoday for her medical conditions/complaints as noted below.  Lois Gutiérrez is c/o of  Chief Complaint   Patient presents with    Medication Check     -medication check   -weight check          HPI:     History of Present Illness  The patient presents for weight management.    She has recently initiated a regimen of semaglutide at a dosage of 1.8 mg, which is reported as being well-tolerated. An active lifestyle is maintained through regular exercise. However, she acknowledges the need for improved dietary habits, noting that her 's dietary choices sometimes influence her own. An ablation procedure is scheduled with Dr. Lamb, necessitating a temporary cessation of her medication 2 weeks prior to the procedure.      Hemoglobin A1C (%)   Date Value   11/17/2022 5.3   03/09/2022 5.2   09/30/2021 5.0             ( goal A1C is < 7)   No components found for: \"LABMICR\"  No components found for: \"LDLCHOLESTEROL\", \"LDLCALC\"    (goal LDL is <100)   AST (U/L)   Date Value   07/03/2024 19     ALT (U/L)   Date Value   07/03/2024 11     BUN (mg/dL)   Date Value   07/03/2024 17     BP Readings from Last 3 Encounters:   03/31/25 110/64   02/10/25 120/62   01/09/25 118/60          ([id number])    Past Medical History:   Diagnosis Date    Abnormal Pap smear 01/01/2005    Anxiety     GDM (gestational diabetes mellitus) 01/01/2007    Hyperthyroidism     Hypothyroidism     Menopausal symptoms     Postpartum depression       Past Surgical History:   Procedure Laterality Date    CERVIX BIOPSY      COLPOSCOPY      ENDOMETRIAL BIOPSY  02/10/2025    Dr Lamb    INTRAUTERINE DEVICE INSERTION  05/2013    Mirena    INTRAUTERINE DEVICE INSERTION  07/10/2018    Mirena     INTRAUTERINE DEVICE REMOVAL  07/10/2018    LEEP  2005

## 2025-05-03 DIAGNOSIS — E03.9 ACQUIRED HYPOTHYROIDISM: ICD-10-CM

## 2025-05-03 RX ORDER — LEVOTHYROXINE SODIUM 50 UG/1
50 TABLET ORAL DAILY
Qty: 90 TABLET | Refills: 1 | Status: SHIPPED | OUTPATIENT
Start: 2025-05-03

## 2025-05-05 ENCOUNTER — HOSPITAL ENCOUNTER (OUTPATIENT)
Dept: PREADMISSION TESTING | Age: 50
Discharge: HOME | End: 2025-05-09
Payer: COMMERCIAL

## 2025-05-05 ENCOUNTER — TELEPHONE (OUTPATIENT)
Dept: OBGYN CLINIC | Age: 50
End: 2025-05-05

## 2025-05-05 VITALS
HEART RATE: 94 BPM | HEIGHT: 60 IN | RESPIRATION RATE: 18 BRPM | OXYGEN SATURATION: 99 % | BODY MASS INDEX: 34.16 KG/M2 | DIASTOLIC BLOOD PRESSURE: 83 MMHG | TEMPERATURE: 97.3 F | WEIGHT: 174 LBS | SYSTOLIC BLOOD PRESSURE: 152 MMHG

## 2025-05-05 DIAGNOSIS — N92.3 INTERMENSTRUAL BLEEDING: ICD-10-CM

## 2025-05-05 DIAGNOSIS — Z01.818 PRE-OP TESTING: ICD-10-CM

## 2025-05-05 LAB
ALBUMIN SERPL-MCNC: 4.6 G/DL (ref 3.5–5.2)
ALP SERPL-CCNC: 78 U/L (ref 35–104)
ALT SERPL-CCNC: 21 U/L (ref 10–35)
ANION GAP SERPL CALCULATED.3IONS-SCNC: 10 MMOL/L (ref 9–16)
AST SERPL-CCNC: 21 U/L (ref 10–35)
B-HCG SERPL EIA 3RD IS-ACNC: 1 MIU/ML (ref 0–7)
BACTERIA URNS QL MICRO: ABNORMAL
BASOPHILS # BLD: 0.1 K/UL (ref 0–0.2)
BASOPHILS NFR BLD: 1 % (ref 0–2)
BILIRUB SERPL-MCNC: 0.3 MG/DL (ref 0–1.2)
BILIRUB UR QL STRIP: NEGATIVE
BUN SERPL-MCNC: 13 MG/DL (ref 6–20)
CALCIUM SERPL-MCNC: 9.4 MG/DL (ref 8.6–10.4)
CASTS #/AREA URNS LPF: ABNORMAL /LPF
CHLORIDE SERPL-SCNC: 100 MMOL/L (ref 98–107)
CLARITY UR: CLEAR
CO2 SERPL-SCNC: 28 MMOL/L (ref 20–31)
COLOR UR: YELLOW
CREAT SERPL-MCNC: 0.9 MG/DL (ref 0.7–1.2)
EKG ATRIAL RATE: 74 BPM
EKG P AXIS: 73 DEGREES
EKG P-R INTERVAL: 176 MS
EKG Q-T INTERVAL: 370 MS
EKG QRS DURATION: 70 MS
EKG QTC CALCULATION (BAZETT): 410 MS
EKG R AXIS: 90 DEGREES
EKG T AXIS: 80 DEGREES
EKG VENTRICULAR RATE: 74 BPM
EOSINOPHIL # BLD: 0.3 K/UL (ref 0–0.4)
EOSINOPHILS RELATIVE PERCENT: 6 % (ref 0–4)
EPI CELLS #/AREA URNS HPF: ABNORMAL /HPF
ERYTHROCYTE [DISTWIDTH] IN BLOOD BY AUTOMATED COUNT: 12.6 % (ref 11.5–14.9)
EST. AVERAGE GLUCOSE BLD GHB EST-MCNC: 91 MG/DL
ESTRADIOL LEVEL: 8.3 PG/ML
FSH SERPL-ACNC: 75.7 MIU/ML
GFR, ESTIMATED: 78 ML/MIN/1.73M2
GLUCOSE SERPL-MCNC: 99 MG/DL (ref 74–99)
GLUCOSE UR STRIP-MCNC: NEGATIVE MG/DL
HBA1C MFR BLD: 4.8 % (ref 4–6)
HCT VFR BLD AUTO: 45.7 % (ref 36–46)
HGB BLD-MCNC: 15.9 G/DL (ref 12–16)
HGB UR QL STRIP.AUTO: ABNORMAL
INR PPP: 1
KETONES UR STRIP-MCNC: ABNORMAL MG/DL
LEUKOCYTE ESTERASE UR QL STRIP: ABNORMAL
LH SERPL-ACNC: 36.4 MIU/ML (ref 1.7–8.6)
LYMPHOCYTES NFR BLD: 2.3 K/UL (ref 1–4.8)
LYMPHOCYTES RELATIVE PERCENT: 42 % (ref 24–44)
MCH RBC QN AUTO: 30.7 PG (ref 26–34)
MCHC RBC AUTO-ENTMCNC: 34.9 G/DL (ref 31–37)
MCV RBC AUTO: 87.9 FL (ref 80–100)
MONOCYTES NFR BLD: 0.4 K/UL (ref 0.1–1.3)
MONOCYTES NFR BLD: 7 % (ref 1–7)
NEUTROPHILS NFR BLD: 44 % (ref 36–66)
NEUTS SEG NFR BLD: 2.4 K/UL (ref 1.3–9.1)
NITRITE UR QL STRIP: NEGATIVE
PARTIAL THROMBOPLASTIN TIME: 27.6 SEC (ref 24–36)
PH UR STRIP: 6.5 [PH] (ref 5–8)
PLATELET # BLD AUTO: 273 K/UL (ref 150–450)
PMV BLD AUTO: 7.2 FL (ref 6–12)
POTASSIUM SERPL-SCNC: 4.3 MMOL/L (ref 3.7–5.3)
PROGEST SERPL-MCNC: 0.4 NG/ML
PROT SERPL-MCNC: 7.5 G/DL (ref 6.6–8.7)
PROT UR STRIP-MCNC: NEGATIVE MG/DL
PROTHROMBIN TIME: 13.7 SEC (ref 11.8–14.6)
RBC # BLD AUTO: 5.19 M/UL (ref 4–5.2)
RBC #/AREA URNS HPF: ABNORMAL /HPF
SODIUM SERPL-SCNC: 138 MMOL/L (ref 136–145)
SP GR UR STRIP: 1.02 (ref 1–1.03)
TSH SERPL DL<=0.05 MIU/L-ACNC: 0.56 UIU/ML (ref 0.27–4.2)
UROBILINOGEN UR STRIP-ACNC: NORMAL EU/DL (ref 0–1)
WBC #/AREA URNS HPF: ABNORMAL /HPF
WBC OTHER # BLD: 5.5 K/UL (ref 3.5–11)

## 2025-05-05 PROCEDURE — 80053 COMPREHEN METABOLIC PANEL: CPT

## 2025-05-05 PROCEDURE — 81001 URINALYSIS AUTO W/SCOPE: CPT

## 2025-05-05 PROCEDURE — 93005 ELECTROCARDIOGRAM TRACING: CPT | Performed by: ANESTHESIOLOGY

## 2025-05-05 PROCEDURE — 85025 COMPLETE CBC W/AUTO DIFF WBC: CPT

## 2025-05-05 PROCEDURE — 82670 ASSAY OF TOTAL ESTRADIOL: CPT

## 2025-05-05 PROCEDURE — 83002 ASSAY OF GONADOTROPIN (LH): CPT

## 2025-05-05 PROCEDURE — 85610 PROTHROMBIN TIME: CPT

## 2025-05-05 PROCEDURE — 83036 HEMOGLOBIN GLYCOSYLATED A1C: CPT

## 2025-05-05 PROCEDURE — 84144 ASSAY OF PROGESTERONE: CPT

## 2025-05-05 PROCEDURE — 83001 ASSAY OF GONADOTROPIN (FSH): CPT

## 2025-05-05 PROCEDURE — 84443 ASSAY THYROID STIM HORMONE: CPT

## 2025-05-05 PROCEDURE — 36415 COLL VENOUS BLD VENIPUNCTURE: CPT

## 2025-05-05 PROCEDURE — 87086 URINE CULTURE/COLONY COUNT: CPT

## 2025-05-05 PROCEDURE — 85730 THROMBOPLASTIN TIME PARTIAL: CPT

## 2025-05-05 PROCEDURE — 84702 CHORIONIC GONADOTROPIN TEST: CPT

## 2025-05-05 NOTE — DISCHARGE INSTRUCTIONS
Pre-op Instructions For Out-Patient Surgery    Medication Instructions:  Please stop herbs and any supplements now (includes vitamins and minerals).    For these medications:  Dulaglutide (Trulicity), Exenatide (Byetta and Bydureon, Liraglutide (Victoza), Lixisenatide (Adlyxin), Semaglutide (Ozempic and Rybelsus), Tirzepatide (Mounjaro, Zepbound)- Stop 1 week prior if taking weekly or 1 day prior if taking every 12 hours or daily.     Please contact your surgeon and prescribing physician for pre-op instructions for any blood thinners.    If you have inhalers/aerosol treatments at home, please use them the morning of your surgery and bring the inhalers with you to the hospital.    Please take the following medications the morning of your surgery with a sip of water:    Levothyroxine    Surgery Instructions:  After midnight before surgery:  Do not eat or drink anything, including water, mints, gum, and hard candy.  You may brush your teeth without swallowing.  No smoking, chewing tobacco, or street drugs.    Please shower or bathe before surgery.  If you were given Surgical Scrub Chlorhexidine Gluconate Liquid (CHG), please shower the night before and the morning of your surgery following the detailed instructions you received during your pre-admission visit.     Please do not wear any cologne, lotion, powder, deodorant, jewelry, piercings, perfume, makeup, nail polish, hair accessories, or hair spray on the day of surgery.  Wear loose comfortable clothing.    Leave your valuables at home but bring a payment source for any after-surgery prescriptions you plan to fill at [facility].  Bring a storage case for any glasses/contacts.    An adult who is responsible for you MUST drive you home and should be with you for the first 24 hours after surgery.     If having out-patient knee and foot surgeries, please arrange for planned crutches, walker, or wheelchair before arriving to the

## 2025-05-06 LAB
MICROORGANISM SPEC CULT: NORMAL
SERVICE CMNT-IMP: NORMAL
SPECIMEN DESCRIPTION: NORMAL

## 2025-05-11 ENCOUNTER — RESULTS FOLLOW-UP (OUTPATIENT)
Dept: OBGYN CLINIC | Age: 50
End: 2025-05-11

## 2025-05-12 ENCOUNTER — TELEMEDICINE (OUTPATIENT)
Dept: OBGYN CLINIC | Age: 50
End: 2025-05-12
Payer: COMMERCIAL

## 2025-05-12 DIAGNOSIS — N92.1 MENORRHAGIA WITH IRREGULAR CYCLE: ICD-10-CM

## 2025-05-12 DIAGNOSIS — N92.3 INTERMENSTRUAL BLEEDING: Primary | ICD-10-CM

## 2025-05-12 DIAGNOSIS — N92.1 BREAKTHROUGH BLEEDING WITH IUD: ICD-10-CM

## 2025-05-12 DIAGNOSIS — N93.9 ABNORMAL UTERINE BLEEDING (AUB): ICD-10-CM

## 2025-05-12 DIAGNOSIS — Z97.5 BREAKTHROUGH BLEEDING WITH IUD: ICD-10-CM

## 2025-05-12 PROCEDURE — 99213 OFFICE O/P EST LOW 20 MIN: CPT | Performed by: OBSTETRICS & GYNECOLOGY

## 2025-05-12 NOTE — PROGRESS NOTES
Lois Gutiérrez  2025    Lois Gutiérrez (:  1975) has requested an audio/video evaluation for the following concern(s):    1. Intermenstrual bleeding    2. Abnormal uterine bleeding (AUB)    3. Breakthrough bleeding with IUD    4. Menorrhagia with irregular cycle          TELEHEALTH EVALUATION -- Audio/Visual (During COVID-19 public health emergency)      Lois Gutiérrez is a 50 y.o. female       She was here to follow-up regarding her labs and diagnostics ordered at her last visit for the diagnosis of:    ICD-10-CM    1. Intermenstrual bleeding  N92.3       2. Abnormal uterine bleeding (AUB)  N93.9       3. Breakthrough bleeding with IUD  N92.1     Z97.5       4. Menorrhagia with irregular cycle  N92.1           She does not have any specific chief complaint today. Her bowels are regular and she is voiding without difficulty.     Appt made to discuss labs.  She was noted to have high FSH and low estradiol that is consistent with perimenopausal or postmenopausal status.  She is scheduled for Endometrial ablation due to breakthrough bleeding with IUD and AUB/HMB.  She is up to date on pap smear and has benign EMB.  We discussed ACOG guidelines and recommendations that do not recommend performing ablation on postmenopausal patient.      IUD was placed 2018, so we discussed recommendation to replace at 5 year mary for abnormal/irregular bleeding.      She was anticipating endometrial ablation, but with these lab results and vasomotor symptoms she has been having we discussed it may not be the best first line treatment.  Discussed R/B/A in detail.  All questions answered    After discussion of options we will plan on Hscope, D&C, Myosure, IUD removal and replacement, Dx Lscope, rrBS.        Past Medical History:   Diagnosis Date    Abnormal Pap smear 2005    Anxiety     GDM (gestational diabetes mellitus) 2007    Hypothyroidism     Menopausal symptoms     Postpartum depression

## 2025-05-12 NOTE — H&P
OB/GYN Pre-Op H&P  [facility]    Patient Name: Lois Gutiérrez     Patient : 1975  Room/Bed: [facility] OR Pool/NONE  Admission Date/Time: 5/15/2025  5:49 AM  Primary Care Physician: Millie Pan APRN - CNP  MRN: [id number]    Date: 5/15/2025  Time: 7:25 AM    The patient was seen in pre-op holding. She is here for Myosure hysteroscopy with dilation and curettage, IUD removal, new IUD insertion, diagnostic laparoscopy, and risk reducing salpingectomy    Lois Gutiérrez is a 50 y.o.  with a history of abnormal uterine bleeding. Patient states that since early this year she has been experiencing menorrhagia and intermenstrual bleeding. She says that she is bleeding for 1.5 weeks then starting to bleed again in 1-1.5 weeks. She has a hormonal IUD in place that was placed in 2018. Discussed with the patient that her IUD is likely no longer providing adequate control of her heavy and irregular menstrual bleeding due to it being greater than 5 years from placement. She reports her family status is complete. Labs obtained showed that patient is likely perimenopausal. Decision was made to proceed with above mentioned procedures to manage her abnormal uterine bleeding and she would like to pursue risk reducing strategies for her elevated risk of ovarian cancer by having her fallopian tubes removed.    The procedure risks and complications were reviewed.  The labs, Consent, and H&P were reviewed and updated.  The patient was counseled on the possibility of  the need of a second surgery.  The patient voiced understanding and had all of her questions answered. The possibility of incomplete removal of abnormal tissue was discussed.    OBSTETRICAL HISTORY:   OB History    Para Term  AB Living   2 2 2 0 0 2   SAB IAB Ectopic Molar Multiple Live Births   0 0 0 0 0 2      # Outcome Date GA Lbr Salty/2nd Weight Sex Type Anes PTL Lv   2 Term 2007 [id number]  2.722 kg F Vag-Spont EPI N DADA   1

## 2025-05-13 ENCOUNTER — TELEPHONE (OUTPATIENT)
Dept: OBGYN CLINIC | Age: 50
End: 2025-05-13

## 2025-05-13 NOTE — TELEPHONE ENCOUNTER
Spoke with patient regarding her IUD removal and reinsertion. It was previously discussed that Mirena would be used however Liletta is the IUD that is used in the hospital. Pt voiced understanding and stated that she was fine with the change. Marian Edwards LPN

## 2025-05-14 ENCOUNTER — ANESTHESIA EVENT (OUTPATIENT)
Dept: OPERATING ROOM | Age: 50
End: 2025-05-14
Payer: COMMERCIAL

## 2025-05-14 NOTE — PRE-PROCEDURE INSTRUCTIONS
Nothing to eat after midnight.   Are you taking any blood thinners? When was the last day?  Make sure to use Hibiclens prior to surgery.  Remove any jewelry and body piercings.  Do you wear glasses? If so, please bring a case to store them in.  Are you having any Covid symptoms?  Do you have any new rashes, infections, etc. that we should be aware of?  Do you have a ride home the day of surgery? It cannot be a cab or medical transportation.  Verify surgery time and what time to arrive at hospital.  NO ANSWER,  LEFT TO ARRIVE AT 0600

## 2025-05-15 ENCOUNTER — HOSPITAL ENCOUNTER (OUTPATIENT)
Age: 50
Setting detail: OUTPATIENT SURGERY
Discharge: HOME | End: 2025-05-15
Attending: OBSTETRICS & GYNECOLOGY | Admitting: OBSTETRICS & GYNECOLOGY
Payer: COMMERCIAL

## 2025-05-15 ENCOUNTER — ANESTHESIA (OUTPATIENT)
Dept: OPERATING ROOM | Age: 50
End: 2025-05-15
Payer: COMMERCIAL

## 2025-05-15 VITALS
HEIGHT: 60 IN | DIASTOLIC BLOOD PRESSURE: 67 MMHG | OXYGEN SATURATION: 98 % | BODY MASS INDEX: 34.16 KG/M2 | TEMPERATURE: 97 F | WEIGHT: 174 LBS | HEART RATE: 75 BPM | SYSTOLIC BLOOD PRESSURE: 108 MMHG | RESPIRATION RATE: 16 BRPM

## 2025-05-15 DIAGNOSIS — N93.9 ABNORMAL UTERINE BLEEDING (AUB): ICD-10-CM

## 2025-05-15 DIAGNOSIS — N92.3 INTERMENSTRUAL BLEEDING: ICD-10-CM

## 2025-05-15 DIAGNOSIS — G89.18 POST-OP PAIN: Primary | ICD-10-CM

## 2025-05-15 DIAGNOSIS — N92.0 HEAVY MENSTRUAL BLEEDING: ICD-10-CM

## 2025-05-15 PROBLEM — Z97.5 BREAKTHROUGH BLEEDING WITH IUD: Status: ACTIVE | Noted: 2025-05-15

## 2025-05-15 PROBLEM — Z91.89 OTHER SPECIFIED PERSONAL RISK FACTORS, NOT ELSEWHERE CLASSIFIED: Status: ACTIVE | Noted: 2025-05-15

## 2025-05-15 PROBLEM — N92.1 BREAKTHROUGH BLEEDING WITH IUD: Status: ACTIVE | Noted: 2025-05-15

## 2025-05-15 PROBLEM — R10.2 PELVIC PAIN: Status: ACTIVE | Noted: 2025-05-15

## 2025-05-15 PROBLEM — Z98.890 POST-OPERATIVE STATE: Status: ACTIVE | Noted: 2025-05-15

## 2025-05-15 PROBLEM — Z40.02 ENCOUNTER FOR PROPHYLACTIC SURGERY FOR RISK FACTOR RELATED TO MALIGNANT NEOPLASM OF OVARY: Status: ACTIVE | Noted: 2025-05-15

## 2025-05-15 LAB — HCG, PREGNANCY URINE (POC): NEGATIVE

## 2025-05-15 PROCEDURE — 7100000001 HC PACU RECOVERY - ADDTL 15 MIN: Performed by: OBSTETRICS & GYNECOLOGY

## 2025-05-15 PROCEDURE — 58700 REMOVAL OF FALLOPIAN TUBE: CPT | Performed by: OBSTETRICS & GYNECOLOGY

## 2025-05-15 PROCEDURE — 2720000010 HC SURG SUPPLY STERILE: Performed by: OBSTETRICS & GYNECOLOGY

## 2025-05-15 PROCEDURE — 2580000003 HC RX 258: Performed by: NURSE ANESTHETIST, CERTIFIED REGISTERED

## 2025-05-15 PROCEDURE — 6370000000 HC RX 637 (ALT 250 FOR IP): Performed by: ANESTHESIOLOGY

## 2025-05-15 PROCEDURE — 81025 URINE PREGNANCY TEST: CPT

## 2025-05-15 PROCEDURE — 3600000003 HC SURGERY LEVEL 3 BASE: Performed by: OBSTETRICS & GYNECOLOGY

## 2025-05-15 PROCEDURE — 58301 REMOVE INTRAUTERINE DEVICE: CPT | Performed by: OBSTETRICS & GYNECOLOGY

## 2025-05-15 PROCEDURE — 6360000002 HC RX W HCPCS: Performed by: NURSE ANESTHETIST, CERTIFIED REGISTERED

## 2025-05-15 PROCEDURE — 58300 INSERT INTRAUTERINE DEVICE: CPT | Performed by: OBSTETRICS & GYNECOLOGY

## 2025-05-15 PROCEDURE — 3700000001 HC ADD 15 MINUTES (ANESTHESIA): Performed by: OBSTETRICS & GYNECOLOGY

## 2025-05-15 PROCEDURE — 3700000000 HC ANESTHESIA ATTENDED CARE: Performed by: OBSTETRICS & GYNECOLOGY

## 2025-05-15 PROCEDURE — 58558 HYSTEROSCOPY BIOPSY: CPT | Performed by: OBSTETRICS & GYNECOLOGY

## 2025-05-15 PROCEDURE — 7100000011 HC PHASE II RECOVERY - ADDTL 15 MIN: Performed by: OBSTETRICS & GYNECOLOGY

## 2025-05-15 PROCEDURE — 88300 SURGICAL PATH GROSS: CPT

## 2025-05-15 PROCEDURE — 7100000031 HC ASPR PHASE II RECOVERY - ADDTL 15 MIN: Performed by: OBSTETRICS & GYNECOLOGY

## 2025-05-15 PROCEDURE — 6360000002 HC RX W HCPCS: Performed by: OBSTETRICS & GYNECOLOGY

## 2025-05-15 PROCEDURE — 6360000002 HC RX W HCPCS: Performed by: ANESTHESIOLOGY

## 2025-05-15 PROCEDURE — 88305 TISSUE EXAM BY PATHOLOGIST: CPT

## 2025-05-15 PROCEDURE — 7100000030 HC ASPR PHASE II RECOVERY - FIRST 15 MIN: Performed by: OBSTETRICS & GYNECOLOGY

## 2025-05-15 PROCEDURE — 2580000003 HC RX 258: Performed by: ANESTHESIOLOGY

## 2025-05-15 PROCEDURE — 3600000013 HC SURGERY LEVEL 3 ADDTL 15MIN: Performed by: OBSTETRICS & GYNECOLOGY

## 2025-05-15 PROCEDURE — 2709999900 HC NON-CHARGEABLE SUPPLY: Performed by: OBSTETRICS & GYNECOLOGY

## 2025-05-15 PROCEDURE — 7100000010 HC PHASE II RECOVERY - FIRST 15 MIN: Performed by: OBSTETRICS & GYNECOLOGY

## 2025-05-15 PROCEDURE — 7100000000 HC PACU RECOVERY - FIRST 15 MIN: Performed by: OBSTETRICS & GYNECOLOGY

## 2025-05-15 PROCEDURE — 2500000003 HC RX 250 WO HCPCS: Performed by: NURSE ANESTHETIST, CERTIFIED REGISTERED

## 2025-05-15 RX ORDER — HYDROCODONE BITARTRATE AND ACETAMINOPHEN 5; 325 MG/1; MG/1
1 TABLET ORAL EVERY 6 HOURS PRN
Status: DISCONTINUED | OUTPATIENT
Start: 2025-05-15 | End: 2025-05-15 | Stop reason: HOSPADM

## 2025-05-15 RX ORDER — SIMETHICONE 80 MG
80 TABLET,CHEWABLE ORAL 4 TIMES DAILY PRN
Qty: 30 TABLET | Refills: 0 | Status: SHIPPED | OUTPATIENT
Start: 2025-05-15

## 2025-05-15 RX ORDER — MIDAZOLAM HYDROCHLORIDE 2 MG/2ML
INJECTION, SOLUTION INTRAMUSCULAR; INTRAVENOUS
Status: DISCONTINUED | OUTPATIENT
Start: 2025-05-15 | End: 2025-05-15 | Stop reason: SDUPTHER

## 2025-05-15 RX ORDER — FENTANYL CITRATE 50 UG/ML
INJECTION, SOLUTION INTRAMUSCULAR; INTRAVENOUS
Status: DISCONTINUED | OUTPATIENT
Start: 2025-05-15 | End: 2025-05-15 | Stop reason: SDUPTHER

## 2025-05-15 RX ORDER — ACETAMINOPHEN 500 MG
1000 TABLET ORAL ONCE
Status: COMPLETED | OUTPATIENT
Start: 2025-05-15 | End: 2025-05-15

## 2025-05-15 RX ORDER — SODIUM CHLORIDE 9 MG/ML
INJECTION, SOLUTION INTRAVENOUS PRN
Status: DISCONTINUED | OUTPATIENT
Start: 2025-05-15 | End: 2025-05-15 | Stop reason: HOSPADM

## 2025-05-15 RX ORDER — DIPHENHYDRAMINE HYDROCHLORIDE 50 MG/ML
12.5 INJECTION, SOLUTION INTRAMUSCULAR; INTRAVENOUS
Status: DISCONTINUED | OUTPATIENT
Start: 2025-05-15 | End: 2025-05-15 | Stop reason: HOSPADM

## 2025-05-15 RX ORDER — SODIUM CHLORIDE 0.9 % (FLUSH) 0.9 %
5-40 SYRINGE (ML) INJECTION EVERY 12 HOURS SCHEDULED
Status: DISCONTINUED | OUTPATIENT
Start: 2025-05-15 | End: 2025-05-15 | Stop reason: HOSPADM

## 2025-05-15 RX ORDER — LABETALOL HYDROCHLORIDE 5 MG/ML
10 INJECTION, SOLUTION INTRAVENOUS
Status: DISCONTINUED | OUTPATIENT
Start: 2025-05-15 | End: 2025-05-15 | Stop reason: HOSPADM

## 2025-05-15 RX ORDER — BUPIVACAINE HYDROCHLORIDE 2.5 MG/ML
INJECTION, SOLUTION EPIDURAL; INFILTRATION; INTRACAUDAL; PERINEURAL PRN
Status: DISCONTINUED | OUTPATIENT
Start: 2025-05-15 | End: 2025-05-15 | Stop reason: ALTCHOICE

## 2025-05-15 RX ORDER — LIDOCAINE HYDROCHLORIDE 10 MG/ML
INJECTION, SOLUTION EPIDURAL; INFILTRATION; INTRACAUDAL; PERINEURAL
Status: DISCONTINUED | OUTPATIENT
Start: 2025-05-15 | End: 2025-05-15 | Stop reason: SDUPTHER

## 2025-05-15 RX ORDER — ONDANSETRON 2 MG/ML
4 INJECTION INTRAMUSCULAR; INTRAVENOUS
Status: DISCONTINUED | OUTPATIENT
Start: 2025-05-15 | End: 2025-05-15 | Stop reason: HOSPADM

## 2025-05-15 RX ORDER — METOCLOPRAMIDE HYDROCHLORIDE 5 MG/ML
10 INJECTION INTRAMUSCULAR; INTRAVENOUS
Status: DISCONTINUED | OUTPATIENT
Start: 2025-05-15 | End: 2025-05-15 | Stop reason: HOSPADM

## 2025-05-15 RX ORDER — FENTANYL CITRATE 0.05 MG/ML
25 INJECTION, SOLUTION INTRAMUSCULAR; INTRAVENOUS EVERY 5 MIN PRN
Status: DISCONTINUED | OUTPATIENT
Start: 2025-05-15 | End: 2025-05-15 | Stop reason: HOSPADM

## 2025-05-15 RX ORDER — GABAPENTIN 300 MG/1
300 CAPSULE ORAL ONCE
Status: COMPLETED | OUTPATIENT
Start: 2025-05-15 | End: 2025-05-15

## 2025-05-15 RX ORDER — SODIUM CHLORIDE 0.9 % (FLUSH) 0.9 %
5-40 SYRINGE (ML) INJECTION PRN
Status: DISCONTINUED | OUTPATIENT
Start: 2025-05-15 | End: 2025-05-15 | Stop reason: HOSPADM

## 2025-05-15 RX ORDER — ROCURONIUM BROMIDE 10 MG/ML
INJECTION, SOLUTION INTRAVENOUS
Status: DISCONTINUED | OUTPATIENT
Start: 2025-05-15 | End: 2025-05-15 | Stop reason: SDUPTHER

## 2025-05-15 RX ORDER — CEFAZOLIN SODIUM 1 G/3ML
INJECTION, POWDER, FOR SOLUTION INTRAMUSCULAR; INTRAVENOUS
Status: DISCONTINUED | OUTPATIENT
Start: 2025-05-15 | End: 2025-05-15 | Stop reason: SDUPTHER

## 2025-05-15 RX ORDER — NALOXONE HYDROCHLORIDE 0.4 MG/ML
INJECTION, SOLUTION INTRAMUSCULAR; INTRAVENOUS; SUBCUTANEOUS PRN
Status: DISCONTINUED | OUTPATIENT
Start: 2025-05-15 | End: 2025-05-15 | Stop reason: HOSPADM

## 2025-05-15 RX ORDER — SCOPOLAMINE 1 MG/3D
1 PATCH, EXTENDED RELEASE TRANSDERMAL ONCE
Status: DISCONTINUED | OUTPATIENT
Start: 2025-05-15 | End: 2025-05-15 | Stop reason: HOSPADM

## 2025-05-15 RX ORDER — DEXAMETHASONE SODIUM PHOSPHATE 4 MG/ML
INJECTION, SOLUTION INTRA-ARTICULAR; INTRALESIONAL; INTRAMUSCULAR; INTRAVENOUS; SOFT TISSUE
Status: DISCONTINUED | OUTPATIENT
Start: 2025-05-15 | End: 2025-05-15 | Stop reason: SDUPTHER

## 2025-05-15 RX ORDER — OXYCODONE HYDROCHLORIDE 5 MG/1
5 TABLET ORAL EVERY 6 HOURS PRN
Qty: 18 TABLET | Refills: 0 | Status: SHIPPED | OUTPATIENT
Start: 2025-05-15 | End: 2025-05-20

## 2025-05-15 RX ORDER — ONDANSETRON 2 MG/ML
INJECTION INTRAMUSCULAR; INTRAVENOUS
Status: DISCONTINUED | OUTPATIENT
Start: 2025-05-15 | End: 2025-05-15 | Stop reason: SDUPTHER

## 2025-05-15 RX ORDER — GLYCOPYRROLATE 0.2 MG/ML
INJECTION INTRAMUSCULAR; INTRAVENOUS
Status: DISCONTINUED | OUTPATIENT
Start: 2025-05-15 | End: 2025-05-15 | Stop reason: SDUPTHER

## 2025-05-15 RX ORDER — PROPOFOL 10 MG/ML
INJECTION, EMULSION INTRAVENOUS
Status: DISCONTINUED | OUTPATIENT
Start: 2025-05-15 | End: 2025-05-15 | Stop reason: SDUPTHER

## 2025-05-15 RX ORDER — SENNA AND DOCUSATE SODIUM 50; 8.6 MG/1; MG/1
2 TABLET, FILM COATED ORAL DAILY
Qty: 60 TABLET | Refills: 1 | Status: SHIPPED | OUTPATIENT
Start: 2025-05-15

## 2025-05-15 RX ORDER — HYDRALAZINE HYDROCHLORIDE 20 MG/ML
10 INJECTION INTRAMUSCULAR; INTRAVENOUS
Status: DISCONTINUED | OUTPATIENT
Start: 2025-05-15 | End: 2025-05-15 | Stop reason: HOSPADM

## 2025-05-15 RX ORDER — SODIUM CHLORIDE 9 MG/ML
INJECTION, SOLUTION INTRAVENOUS
Status: DISCONTINUED | OUTPATIENT
Start: 2025-05-15 | End: 2025-05-15 | Stop reason: SDUPTHER

## 2025-05-15 RX ORDER — SODIUM CHLORIDE 9 MG/ML
INJECTION, SOLUTION INTRAVENOUS CONTINUOUS
Status: DISCONTINUED | OUTPATIENT
Start: 2025-05-15 | End: 2025-05-15 | Stop reason: HOSPADM

## 2025-05-15 RX ORDER — LIDOCAINE HYDROCHLORIDE 10 MG/ML
1 INJECTION, SOLUTION EPIDURAL; INFILTRATION; INTRACAUDAL; PERINEURAL
Status: DISCONTINUED | OUTPATIENT
Start: 2025-05-15 | End: 2025-05-15 | Stop reason: HOSPADM

## 2025-05-15 RX ADMIN — SODIUM CHLORIDE: 9 INJECTION, SOLUTION INTRAVENOUS at 07:14

## 2025-05-15 RX ADMIN — FENTANYL CITRATE 50 MCG: 50 INJECTION INTRAMUSCULAR; INTRAVENOUS at 08:24

## 2025-05-15 RX ADMIN — SUGAMMADEX 200 MG: 100 INJECTION, SOLUTION INTRAVENOUS at 09:32

## 2025-05-15 RX ADMIN — GABAPENTIN 300 MG: 300 CAPSULE ORAL at 07:15

## 2025-05-15 RX ADMIN — CEFAZOLIN 2 G: 1 INJECTION, POWDER, FOR SOLUTION INTRAMUSCULAR; INTRAVENOUS at 08:41

## 2025-05-15 RX ADMIN — DEXAMETHASONE SODIUM PHOSPHATE 8 MG: 4 INJECTION INTRA-ARTICULAR; INTRALESIONAL; INTRAMUSCULAR; INTRAVENOUS; SOFT TISSUE at 09:00

## 2025-05-15 RX ADMIN — LIDOCAINE HYDROCHLORIDE 50 MG: 10 INJECTION, SOLUTION EPIDURAL; INFILTRATION; INTRACAUDAL; PERINEURAL at 08:24

## 2025-05-15 RX ADMIN — MIDAZOLAM HYDROCHLORIDE 2 MG: 1 INJECTION, SOLUTION INTRAMUSCULAR; INTRAVENOUS at 08:21

## 2025-05-15 RX ADMIN — PROPOFOL 100 MG: 10 INJECTION, EMULSION INTRAVENOUS at 08:27

## 2025-05-15 RX ADMIN — PROPOFOL 200 MG: 10 INJECTION, EMULSION INTRAVENOUS at 08:24

## 2025-05-15 RX ADMIN — SODIUM CHLORIDE: 900 INJECTION, SOLUTION INTRAVENOUS at 09:27

## 2025-05-15 RX ADMIN — ROCURONIUM BROMIDE 50 MG: 10 INJECTION, SOLUTION INTRAVENOUS at 08:33

## 2025-05-15 RX ADMIN — GLYCOPYRROLATE 0.2 MG: 0.2 INJECTION INTRAMUSCULAR; INTRAVENOUS at 08:59

## 2025-05-15 RX ADMIN — ACETAMINOPHEN 1000 MG: 500 TABLET ORAL at 07:15

## 2025-05-15 RX ADMIN — FENTANYL CITRATE 50 MCG: 50 INJECTION INTRAMUSCULAR; INTRAVENOUS at 08:27

## 2025-05-15 RX ADMIN — HYDROMORPHONE HYDROCHLORIDE 0.5 MG: 1 INJECTION, SOLUTION INTRAMUSCULAR; INTRAVENOUS; SUBCUTANEOUS at 10:02

## 2025-05-15 RX ADMIN — ONDANSETRON 4 MG: 2 INJECTION, SOLUTION INTRAMUSCULAR; INTRAVENOUS at 09:00

## 2025-05-15 RX ADMIN — SODIUM CHLORIDE: 900 INJECTION, SOLUTION INTRAVENOUS at 08:20

## 2025-05-15 ASSESSMENT — PAIN DESCRIPTION - DESCRIPTORS
DESCRIPTORS: CRAMPING

## 2025-05-15 ASSESSMENT — PAIN DESCRIPTION - LOCATION
LOCATION: ABDOMEN
LOCATION: ABDOMEN

## 2025-05-15 ASSESSMENT — PAIN SCALES - GENERAL
PAINLEVEL_OUTOF10: 3
PAINLEVEL_OUTOF10: 8

## 2025-05-15 ASSESSMENT — PAIN - FUNCTIONAL ASSESSMENT
PAIN_FUNCTIONAL_ASSESSMENT: ADULT NONVERBAL PAIN SCALE (NPVS)
PAIN_FUNCTIONAL_ASSESSMENT: 0-10
PAIN_FUNCTIONAL_ASSESSMENT: 0-10

## 2025-05-15 NOTE — BRIEF OP NOTE
Brief Operative Note  Department of Obstetrics and Gynecology  [facility]     Patient: Lois Gutiérrez   : 1975  MRN: [id number]       Acct: [id number]   Date of Procedure: 5/15/25     Pre-operative Diagnosis:   50 y.o. female    Abnormal uterine bleeding  Menorrhagia  Intermenstrual bleeding  Perimenopausal  Elevated risk for ovarian cancer  BMI 33    Post-operative Diagnosis:   50 y.o. female    Abnormal uterine bleeding  Menorrhagia  Intermenstrual bleeding  Perimenopausal  Elevated risk for ovarian cancer  BMI 33    Procedure: Myosure hysteroscopy with dilation and curettage, IUD removal, new IUD insertion, diagnostic laparoscopy, and risk reducing salpingectomy    Surgeon: Dr. Lamb     Assistant(s): Joanie Whitlock MD, PGY2    Anesthesia: general via ETT    Findings:  Normal appearing external genitalia. Normal appearing vaginal mucosa. Normal appearing cervix with no lesions or discharge. On bimanual exam midline anteverted mobile uterus, no adnexal fullness bilaterally. Survey of pelvis reveals small pelvic adhesions in posterior cul-de-sac, normal appearing uterus, normal appearing bilateral ovaries, normal appearing bilateral fallopian tubes, 1.5 cm pelvic cyst-like lesion freely detached in posterior cul-de-sac     Total IV fluids/Blood products:  1100 ml crystalloid  Urine Output:  250 ml    Estimated blood loss:  15 ml  Drains:  none  Specimens:  intrauterine device, sharp endometrial curettings, Myosure endometrial curettings, bilateral fallopian tubes, pelvic lesion   Instrument and Sponge Count: Correct  Complications:  none  Condition:  good, transferred to post anesthesia recovery    See full operative report for further details.    Joanie Whitlock MD  Ob/Gyn Resident  5/15/2025, 9:48 AM

## 2025-05-15 NOTE — PROGRESS NOTES
CLINICAL PHARMACY NOTE: MEDS TO BEDS    Total # of Prescriptions Filled: 3   The following medications were delivered to the patient:  Gas Relief 80MG Chews  Stimulant Laxative 8.6-50MG Tablets   Oxycodone 5MG Tablets     Additional Documentation:  Picked up at the pharmacy by Mode Gutiérrez at 10:12AM 5/15/25

## 2025-05-15 NOTE — ANESTHESIA POSTPROCEDURE EVALUATION
Department of Anesthesiology  Postprocedure Note    Patient: Lois Gutiérrez  MRN: [id number]  YOB: 1975  Date of evaluation: 5/15/2025    Procedure Summary       Date: 05/15/25 Room / Location: [address]    Anesthesia Start: 0821 Anesthesia Stop: 0949    Procedures:       DILATATION AND CURETTAGE HYSTEROSCOPY MYOSURE WITH MEDICALLY NECESSARY IUD REMOVAL AND INSERTION LILETTA (Uterus)      SALPINGECTOMY LAPAROSCOPIC MEDICALLY NECESSARY BILATERAL (Bilateral: Abdomen) Diagnosis:       Abnormal uterine bleeding (AUB)      Intermenstrual bleeding      Heavy menstrual bleeding      (Abnormal uterine bleeding (AUB) [N93.9])      (Intermenstrual bleeding [N92.3])      (Heavy menstrual bleeding [N92.0])    Surgeons: Poli Lamb DO Responsible Provider: Melba Gonzalez MD    Anesthesia Type: general ASA Status: 2            Anesthesia Type: No value filed.    Kathie Phase I: Kathie Score: 10    Kathie Phase II: Kathie Score: 10    Anesthesia Post Evaluation    Comments: POST- ANESTHESIA EVALUATION       Pt Name: Lois Gutiérrez  MRN: [id number]  YOB: 1975  Date of evaluation: 5/15/2025  Time:  12:03 PM      /67   Pulse 75   Temp 97 °F (36.1 °C) (Infrared)   Resp 16   Ht 1.524 m (5')   Wt 78.9 kg (174 lb)   LMP 04/12/2025 (Exact Date)   SpO2 98%   BMI 33.98 kg/m²      Consciousness Level  Awake  Cardiopulmonary Status  Stable  Pain Adequately Treated YES  Nausea / Vomiting  NO  Adequate Hydration  YES  Anesthesia Related Complications NONE      Electronically signed by Melba Gonzalez MD on 5/15/2025 at 12:03 PM      No notable events documented.

## 2025-05-15 NOTE — ANESTHESIA PRE PROCEDURE
Department of Anesthesiology  Preprocedure Note       Name:  Lois Gutiérrez   Age:  50 y.o.  :  1975                                          MRN:  [id number]         Date:  5/15/2025      Surgeon: Surgeon(s):  Poli Lamb DO    Procedure: Procedure(s):  DILATATION AND CURETTAGE HYSTEROSCOPY MYOSURE WITH MEDICALLY NECESSARY IUD REMOVAL AND INSERTION LILETTA  SALPINGECTOMY LAPAROSCOPIC MEDICALLY NECESSARY BILATERAL    Medications prior to admission:   Prior to Admission medications    Medication Sig Start Date End Date Taking? Authorizing Provider   levothyroxine (SYNTHROID) 50 MCG tablet TAKE ONE TABLET BY MOUTH ONCE A DAY 5/3/25  Yes Millie Pan APRN - CNP   venlafaxine (EFFEXOR XR) 75 MG extended release capsule TAKE ONE CAPSULE BY MOUTH ONCE A DAY 25  Yes Millie Pan APRN - CNP   buPROPion (WELLBUTRIN XL) 300 MG extended release tablet TAKE ONE TABLET BY MOUTH EVERY MORNING 25  Yes Millie Pan APRN - CNP   SEMAGLUTIDE-WEIGHT MANAGEMENT SC Inject 0.6 mg into the skin once a week    Provider, MD Bill       Current medications:    Current Facility-Administered Medications   Medication Dose Route Frequency Provider Last Rate Last Admin   • sodium chloride flush 0.9 % injection 5-40 mL  5-40 mL IntraVENous 2 times per day Rajan Gamble MD       • sodium chloride flush 0.9 % injection 5-40 mL  5-40 mL IntraVENous PRN Rajan Gamble MD       • 0.9 % sodium chloride infusion   IntraVENous PRN Rajan Gamble MD       • 0.9 % sodium chloride infusion   IntraVENous Continuous Rajan Gamble MD   Paused at 05/15/25 0715   • lidocaine PF 1 % injection 1 mL  1 mL IntraDERmal Once PRN Rajan Gamble MD       • scopolamine (TRANSDERM-SCOP) transdermal patch 1 patch  1 patch TransDERmal Once Melba Gonzalez MD   1 patch at 05/15/25 0715       Allergies:  No Known Allergies    Problem List:    Patient Active Problem List   Diagnosis Code   • Anxiety F41.9   • Abnormal Pap smear R87.619   •

## 2025-05-15 NOTE — OP NOTE
Operative Note  Department of Obstetrics and Gynecology  [facility]       Patient: Lois Gutiérrez   : 1975  MRN: [id number]       Acct: [id number]   PCP: Millie Pan APRN - CNP  Date of Procedure: 5/15/25    Pre-operative Diagnosis:   50 y.o. female    Abnormal uterine bleeding  Menorrhagia  Intermenstrual bleeding  Perimenopausal  Elevated risk for ovarian cancer  BMI 33     Post-operative Diagnosis:   50 y.o. female    Abnormal uterine bleeding  Menorrhagia  Intermenstrual bleeding  Perimenopausal  Elevated risk for ovarian cancer  BMI 33     Procedure: Myosure hysteroscopy with dilation and curettage, IUD removal, new IUD insertion, diagnostic laparoscopy, and risk reducing salpingectomy     Surgeon: Dr. Lamb     Assistant(s): Jaonie Whitlock MD, PGY2    Anesthesia: general    Indications: Lois Gutiérrez is a 50 y.o.  with a history of abnormal uterine bleeding for the last 1-2 years. She states she has an IUD in place since 2018 and would like a more effective method of controlling her AUB. Discussed that her IUD is no longer within the expected therapeutic range for heavy menstrual bleeding as it has been more than 5 years. After lab work was completed, it was noted that patient was likely perimenopausal with high FSH and low estradiol. Recommendation was made for hysteroscopy, dilation and curettage, and new IUD insertion for management of bleeding at this time as ablation is not recommended in menopausal state. Patient is in agreement with plan. She also is at elevated risk for ovarian cancer and is desiring a risk reducing salpingectomy at time of procedure. Agreed to proceed to the OR.    Procedure Details   The patient was seen in the pre-op room. The risks, benefits, complications, treatment options, and expected outcomes were discussed with the patient. The patient concurred with the proposed plan, giving informed consent. The patient was  pelvis, small 1 cm detached, cyst like structure noted and collected for pathology. Attention then turned to salpingectomy. The right fallopian tube was grasped and using the ligasure, the mesosalpinx was coagulated and cut in order to perform the salpingectomy. The same procedure was performed on the left and once each salpinx was , they were passed off for pathology evaluation.    The pneumoperitoneum was then released.  The remaining ports were removed under direct visualization.  The skin incisions were then closed with running suture of 4-0 Monocryl placed in the usual fashion.  Skin glue was applied to each of the incisions.     Attention then returned to vagina for hysteroscopic portion of case. The MyoSure hysteroscope was inserted in the external cervical os and advanced into the endometrial cavity under direct visualization.  The endometrial cavity normal in appearance with small blood clots noted along fundus and posterior wall.  Otherwise, the endometrial cavity had a uniform appearance. Utilizing the MyoSure device, representative samples of the endometrial cavity were obtained and submitted for routine histology.  At this point, the hysteroscope was withdrawn.  A single-tooth tenaculum was kept on the anterior lip of the cervix and sharp curettage was carried out yielding endometrial curettings that were submitted to pathology for evaluation.    Prior to removal of all instruments from the vagina, the Liletta IUD was opened and loaded into the delivery system. The wand was inserted just past the internal portio and 1st button was retracted to the 2nd button. The wand was held in place for 10 seconds and then the button was retracted to its final position at 3rd button while the IUD was moved to the fundus. The string was trimmed in standard fashion. The speculum was then removed. The patient tolerated the procedure without difficulty. All instruments were then removed from the vagina.

## 2025-05-19 LAB — SURGICAL PATHOLOGY REPORT: NORMAL

## 2025-05-24 ENCOUNTER — RESULTS FOLLOW-UP (OUTPATIENT)
Dept: OBGYN CLINIC | Age: 50
End: 2025-05-24

## 2025-05-30 ENCOUNTER — OFFICE VISIT (OUTPATIENT)
Dept: OBGYN CLINIC | Age: 50
End: 2025-05-30

## 2025-05-30 VITALS
BODY MASS INDEX: 34.95 KG/M2 | SYSTOLIC BLOOD PRESSURE: 122 MMHG | HEIGHT: 60 IN | WEIGHT: 178 LBS | DIASTOLIC BLOOD PRESSURE: 72 MMHG

## 2025-05-30 DIAGNOSIS — Z98.890 POST-OPERATIVE STATE: Primary | ICD-10-CM

## 2025-05-30 PROCEDURE — 99024 POSTOP FOLLOW-UP VISIT: CPT | Performed by: OBSTETRICS & GYNECOLOGY

## 2025-05-30 NOTE — PROGRESS NOTES
I discussed with pt and rescheduled him for 1 pm on monday   [facility]  ANATOMIC PATHOLOGY  [address] [place] [address].  [place], [place] [address] [phone]  Fax: [phone]     POCT HCG, Prenancy, Ur   Result Value Ref Range    HCG, Pregnancy Urine (POC) NEGATIVE NEGATIVE                     Assessment:      Diagnosis Orders   1. S/p Dx lap, bRRS, Hscope, D&C, IUD replacement 5/15/25             Patient Active Problem List    Diagnosis Date Noted    Mild depression 11/09/2022     Priority: Medium    S/p Dx lap, bRRS, Hscope, D&C, IUD replacement 5/15/25 05/15/2025     Liletta IUD replaced intraoperatively  Lot: [id number]  Exp: 06/2029      Other specified personal risk factors, not elsewhere classified 05/15/2025    Encounter for prophylactic surgery for risk factor related to malignant neoplasm of ovary 05/15/2025    Pelvic pain 05/15/2025    Breakthrough bleeding with IUD 05/15/2025    Intermenstrual bleeding 03/06/2025    Heavy menstrual bleeding 03/06/2025    Menorrhagia with irregular cycle 11/26/2024    Abnormal uterine bleeding (AUB) 11/26/2024    Severe obesity (BMI 35.0-39.9) with comorbidity (HCC) 07/03/2024    Mixed hyperlipidemia 07/03/2024    Hyperglycemia 09/23/2020    Acquired hypothyroidism 09/23/2020    Anxiety 07/25/2014    Fibroadenoma of left breast 07/25/2014    Abnormal Pap smear           POD# 2 weeks   Procedure: see above   Stable   Pathology reviewed and found to be benign. Yes    Plan:   Return if symptoms worsen or fail to improve, for annual.  Restrictions lifted

## 2025-06-05 DIAGNOSIS — F32.A MILD DEPRESSION: ICD-10-CM

## 2025-06-05 DIAGNOSIS — F41.9 ANXIETY: ICD-10-CM

## 2025-06-05 RX ORDER — VENLAFAXINE HYDROCHLORIDE 75 MG/1
75 CAPSULE, EXTENDED RELEASE ORAL DAILY
Qty: 90 CAPSULE | Refills: 3 | Status: SHIPPED | OUTPATIENT
Start: 2025-06-05

## 2025-06-05 RX ORDER — BUPROPION HYDROCHLORIDE 300 MG/1
300 TABLET ORAL EVERY MORNING
Qty: 90 TABLET | Refills: 3 | Status: SHIPPED | OUTPATIENT
Start: 2025-06-05

## 2025-06-14 PROBLEM — Z98.890 POST-OPERATIVE STATE: Status: RESOLVED | Noted: 2025-05-15 | Resolved: 2025-06-14

## 2025-06-19 LAB
CHOLEST SERPL-MCNC: 186 MG/DL (ref 0–199)
CHOLESTEROL/HDL RATIO: 3.4
GLUCOSE SERPL-MCNC: 82 MG/DL (ref 74–99)
HDLC SERPL-MCNC: 55 MG/DL
LDLC SERPL CALC-MCNC: 111 MG/DL (ref 0–100)
PATIENT FASTING?: YES
TRIGL SERPL-MCNC: 100 MG/DL
VLDLC SERPL CALC-MCNC: 20 MG/DL (ref 1–30)

## 2025-07-01 ENCOUNTER — OFFICE VISIT (OUTPATIENT)
Dept: PRIMARY CARE CLINIC | Age: 50
End: 2025-07-01
Payer: COMMERCIAL

## 2025-07-01 VITALS
DIASTOLIC BLOOD PRESSURE: 74 MMHG | HEART RATE: 84 BPM | RESPIRATION RATE: 18 BRPM | BODY MASS INDEX: 34.37 KG/M2 | SYSTOLIC BLOOD PRESSURE: 130 MMHG | WEIGHT: 176 LBS | OXYGEN SATURATION: 98 %

## 2025-07-01 DIAGNOSIS — N93.9 ABNORMAL UTERINE BLEEDING (AUB): Primary | ICD-10-CM

## 2025-07-01 DIAGNOSIS — E66.01 SEVERE OBESITY (BMI 35.0-39.9) WITH COMORBIDITY (HCC): ICD-10-CM

## 2025-07-01 DIAGNOSIS — Z71.3 DIETARY COUNSELING: ICD-10-CM

## 2025-07-01 PROCEDURE — 99213 OFFICE O/P EST LOW 20 MIN: CPT | Performed by: NURSE PRACTITIONER

## 2025-07-01 SDOH — ECONOMIC STABILITY: FOOD INSECURITY: WITHIN THE PAST 12 MONTHS, THE FOOD YOU BOUGHT JUST DIDN'T LAST AND YOU DIDN'T HAVE MONEY TO GET MORE.: NEVER TRUE

## 2025-07-01 SDOH — ECONOMIC STABILITY: FOOD INSECURITY: WITHIN THE PAST 12 MONTHS, YOU WORRIED THAT YOUR FOOD WOULD RUN OUT BEFORE YOU GOT MONEY TO BUY MORE.: NEVER TRUE

## 2025-07-01 ASSESSMENT — ENCOUNTER SYMPTOMS
SORE THROAT: 0
COUGH: 0
EYE REDNESS: 0
WHEEZING: 0
SINUS PRESSURE: 0
SINUS PAIN: 0
DIARRHEA: 0
EYE DISCHARGE: 0
NAUSEA: 0
TROUBLE SWALLOWING: 0
SHORTNESS OF BREATH: 0
EYE ITCHING: 0
VOMITING: 0
CHEST TIGHTNESS: 0
ABDOMINAL PAIN: 0

## 2025-07-01 ASSESSMENT — PATIENT HEALTH QUESTIONNAIRE - PHQ9
9. THOUGHTS THAT YOU WOULD BE BETTER OFF DEAD, OR OF HURTING YOURSELF: NOT AT ALL
5. POOR APPETITE OR OVEREATING: NOT AT ALL
10. IF YOU CHECKED OFF ANY PROBLEMS, HOW DIFFICULT HAVE THESE PROBLEMS MADE IT FOR YOU TO DO YOUR WORK, TAKE CARE OF THINGS AT HOME, OR GET ALONG WITH OTHER PEOPLE: NOT DIFFICULT AT ALL
SUM OF ALL RESPONSES TO PHQ QUESTIONS 1-9: 0
1. LITTLE INTEREST OR PLEASURE IN DOING THINGS: NOT AT ALL
SUM OF ALL RESPONSES TO PHQ QUESTIONS 1-9: 0
6. FEELING BAD ABOUT YOURSELF - OR THAT YOU ARE A FAILURE OR HAVE LET YOURSELF OR YOUR FAMILY DOWN: NOT AT ALL
3. TROUBLE FALLING OR STAYING ASLEEP: NOT AT ALL
2. FEELING DOWN, DEPRESSED OR HOPELESS: NOT AT ALL
7. TROUBLE CONCENTRATING ON THINGS, SUCH AS READING THE NEWSPAPER OR WATCHING TELEVISION: NOT AT ALL
8. MOVING OR SPEAKING SO SLOWLY THAT OTHER PEOPLE COULD HAVE NOTICED. OR THE OPPOSITE, BEING SO FIGETY OR RESTLESS THAT YOU HAVE BEEN MOVING AROUND A LOT MORE THAN USUAL: NOT AT ALL
SUM OF ALL RESPONSES TO PHQ QUESTIONS 1-9: 0
SUM OF ALL RESPONSES TO PHQ QUESTIONS 1-9: 0
4. FEELING TIRED OR HAVING LITTLE ENERGY: NOT AT ALL

## 2025-07-01 NOTE — PROGRESS NOTES
BIOPSY      COLPOSCOPY      DILATION AND CURETTAGE OF UTERUS N/A 5/15/2025    DILATATION AND CURETTAGE HYSTEROSCOPY MYOSURE WITH MEDICALLY NECESSARY IUD REMOVAL AND INSERTION LILETTA performed by Poli Lamb DO at [facility] OR    ENDOMETRIAL BIOPSY  02/10/2025    Dr Lamb    INTRAUTERINE DEVICE INSERTION  2013    Mirena    INTRAUTERINE DEVICE INSERTION  07/10/2018    Mirena     INTRAUTERINE DEVICE REMOVAL  07/10/2018    LEEP  2005    SALPINGECTOMY Bilateral 5/15/2025    SALPINGECTOMY LAPAROSCOPIC MEDICALLY NECESSARY BILATERAL performed by Poli Lamb DO at [facility] OR    WISDOM TOOTH EXTRACTION         Family History   Problem Relation Age of Onset    High Cholesterol Mother     Miscarriages / Stillbirths Mother     Arthritis Mother     Other Father         mass in his colon     Diabetes Father     Cancer Father         colon    Heart Disease Maternal Grandmother     Heart Disease Maternal Grandfather     Cancer Maternal Grandfather         prostate/lung    Other Paternal Grandfather         aneurysm    Stroke Paternal Grandfather     Heart Disease Maternal Aunt     Coronary Art Dis Maternal Aunt     Obesity Maternal Aunt     Breast Cancer Maternal Aunt         over 50       Social History     Tobacco Use    Smoking status: Former     Current packs/day: 0.00     Average packs/day: 0.5 packs/day for 2.0 years (1.0 ttl pk-yrs)     Types: Cigarettes     Start date: 2000     Quit date: 2002     Years since quittin.0    Smokeless tobacco: Never   Substance Use Topics    Alcohol use: No      Current Outpatient Medications   Medication Sig Dispense Refill    buPROPion (WELLBUTRIN XL) 300 MG extended release tablet TAKE ONE TABLET BY MOUTH EVERY MORNING 90 tablet 3    venlafaxine (EFFEXOR XR) 75 MG extended release capsule TAKE ONE CAPSULE BY MOUTH ONCE A DAY 90 capsule 3    simethicone (MYLICON) 80 MG chewable tablet Take 1 tablet by mouth 4 times daily as needed for Flatulence 30 tablet 0    levothyroxine

## (undated) DEVICE — SYRINGE MED 10ML LUERLOCK TIP W/O SFTY DISP

## (undated) DEVICE — DEVICE TISS REM L32CM DIA3MM S STL ULT HRD HI WR RESISTANCE

## (undated) DEVICE — LIQUIBAND RAPID ADHESIVE 36/CS 0.8ML: Brand: MEDLINE

## (undated) DEVICE — TROCAR: Brand: KII FIOS FIRST ENTRY

## (undated) DEVICE — GLOVE ORANGE PI 7   MSG9070

## (undated) DEVICE — BLANKET WRM W29.9XL79.1IN UP BODY FORC AIR MISTRAL-AIR

## (undated) DEVICE — Device: Brand: NAKAO QUICKTRAP

## (undated) DEVICE — TROCAR: Brand: KII® SLEEVE

## (undated) DEVICE — SINGLE PORT MANIFOLD: Brand: NEPTUNE 2

## (undated) DEVICE — INJECTOR UTERINE MANIPULATOR

## (undated) DEVICE — SOLUTION IRRIG 1000ML 09% SOD CHL USP PIC PLAS CONTAINER

## (undated) DEVICE — PAD,SANITARY,11 IN,MAXI,W/WINGS,N-STRL: Brand: MEDLINE

## (undated) DEVICE — ST CHARLES PERI-GYN: Brand: MEDLINE INDUSTRIES, INC.

## (undated) DEVICE — SET ENDOSCP SEAL HYSTEROSCOPE RIG OUTFLO CHN DISP MYOSURE

## (undated) DEVICE — INSUFFLATION NEEDLE TO ESTABLISH PNEUMOPERITONEUM.: Brand: INSUFFLATION NEEDLE

## (undated) DEVICE — COVER LT HNDL BLU PLAS

## (undated) DEVICE — SEALER LAP L37CM MARYLAND JAW OPN NANO COAT MULTIFUNCTIONAL

## (undated) DEVICE — FLUID MGMT SYS FLUENT KIT 6/PK

## (undated) DEVICE — DRESSING TRNSPAR W2XL2.75IN FLM SHT SEMIPERMEABLE WIND

## (undated) DEVICE — UNDERPANTS MAT L XL SEAMLESS CLR CODE WAISTBAND KNIT

## (undated) DEVICE — SOL IRR SOD CHL 0.9% TITAN XL CNTNR 3000ML

## (undated) DEVICE — SUTURE MONOCRYL + SZ 4-0 L27IN ABSRB UD L19MM PS-2 3/8 CIR MCP426H

## (undated) DEVICE — 1LYRTR 16FR10ML 100%SILI SNAP: Brand: MEDLINE INDUSTRIES, INC.

## (undated) DEVICE — PAD,NON-ADHERENT,3X8,STERILE,LF,1/PK: Brand: MEDLINE

## (undated) DEVICE — ST CHARLES GYN LAPAROSCOPY: Brand: MEDLINE INDUSTRIES, INC.

## (undated) DEVICE — STRIP,CLOSURE,WOUND,MEDI-STRIP,1/2X4: Brand: MEDLINE

## (undated) DEVICE — CONTAINER,SPECIMEN,4OZ,OR STRL: Brand: MEDLINE

## (undated) DEVICE — TUBING INSUFFLATION SMK EVAC HI FLO SET PNEUMOCLEAR

## (undated) DEVICE — SOLUTION ANTIFOG VIS SYS CLEARIFY LAPSCP

## (undated) DEVICE — SOLUTION IRRIG 1000ML H2O PIC PLAS SHATTERPROOF CONTAINER